# Patient Record
Sex: FEMALE | Race: WHITE | Employment: FULL TIME | URBAN - METROPOLITAN AREA
[De-identification: names, ages, dates, MRNs, and addresses within clinical notes are randomized per-mention and may not be internally consistent; named-entity substitution may affect disease eponyms.]

---

## 2018-04-03 ENCOUNTER — HOSPITAL ENCOUNTER (EMERGENCY)
Age: 63
Discharge: HOME | End: 2018-04-03
Attending: EMERGENCY MEDICINE
Payer: COMMERCIAL

## 2018-04-03 VITALS
TEMPERATURE: 98.4 F | DIASTOLIC BLOOD PRESSURE: 88 MMHG | HEART RATE: 92 BPM | SYSTOLIC BLOOD PRESSURE: 173 MMHG | OXYGEN SATURATION: 96 % | BODY MASS INDEX: 32.38 KG/M2 | RESPIRATION RATE: 18 BRPM | WEIGHT: 182.76 LBS | HEIGHT: 63 IN

## 2018-04-03 DIAGNOSIS — Z71.6 TOBACCO ABUSE COUNSELING: ICD-10-CM

## 2018-04-03 DIAGNOSIS — Z76.0 MEDICATION REFILL: ICD-10-CM

## 2018-04-03 DIAGNOSIS — J44.1 COPD EXACERBATION (HCC): Primary | ICD-10-CM

## 2018-04-03 PROCEDURE — 99282 EMERGENCY DEPT VISIT SF MDM: CPT

## 2018-04-03 RX ORDER — ALBUTEROL SULFATE 0.83 MG/ML
2.5 SOLUTION RESPIRATORY (INHALATION)
Qty: 24 EACH | Refills: 0 | Status: SHIPPED | OUTPATIENT
Start: 2018-04-03 | End: 2019-08-08 | Stop reason: SDUPTHER

## 2018-04-03 RX ORDER — PREDNISONE 20 MG/1
60 TABLET ORAL
Qty: 15 TAB | Refills: 0 | Status: SHIPPED | OUTPATIENT
Start: 2018-04-03 | End: 2018-04-08

## 2018-04-03 RX ORDER — DOXYCYCLINE HYCLATE 100 MG
100 TABLET ORAL 2 TIMES DAILY
Qty: 14 TAB | Refills: 0 | Status: SHIPPED | OUTPATIENT
Start: 2018-04-03 | End: 2018-04-10

## 2018-04-03 NOTE — DISCHARGE INSTRUCTIONS
Chronic Obstructive Pulmonary Disease (COPD): Care Instructions  Your Care Instructions    Chronic obstructive pulmonary disease (COPD) is a general term for a group of lung diseases, including emphysema and chronic bronchitis. People with COPD have decreased airflow in and out of the lungs, which makes it hard to breathe. The airways also can get clogged with thick mucus. Cigarette smoking is a major cause of COPD. Although there is no cure for COPD, you can slow its progress. Following your treatment plan and taking care of yourself can help you feel better and live longer. Follow-up care is a key part of your treatment and safety. Be sure to make and go to all appointments, and call your doctor if you are having problems. It's also a good idea to know your test results and keep a list of the medicines you take. How can you care for yourself at home? ?Staying healthy  ? · Do not smoke. This is the most important step you can take to prevent more damage to your lungs. If you need help quitting, talk to your doctor about stop-smoking programs and medicines. These can increase your chances of quitting for good. ? · Avoid colds and flu. Get a pneumococcal vaccine shot. If you have had one before, ask your doctor whether you need a second dose. Get the flu vaccine every fall. If you must be around people with colds or the flu, wash your hands often. ? · Avoid secondhand smoke, air pollution, and high altitudes. Also avoid cold, dry air and hot, humid air. Stay at home with your windows closed when air pollution is bad. ?Medicines and oxygen therapy  ? · Take your medicines exactly as prescribed. Call your doctor if you think you are having a problem with your medicine. ? · You may be taking medicines such as:  ¨ Bronchodilators. These help open your airways and make breathing easier. Bronchodilators are either short-acting (work for 6 to 9 hours) or long-acting (work for 24 hours).  You inhale most bronchodilators, so they start to act quickly. Always carry your quick-relief inhaler with you in case you need it while you are away from home. ¨ Corticosteroids (prednisone, budesonide). These reduce airway inflammation. They come in pill or inhaled form. You must take these medicines every day for them to work well. ? · A spacer may help you get more inhaled medicine to your lungs. Ask your doctor or pharmacist if a spacer is right for you. If it is, ask how to use it properly. ? · Do not take any vitamins, over-the-counter medicine, or herbal products without talking to your doctor first.   ? · If your doctor prescribed antibiotics, take them as directed. Do not stop taking them just because you feel better. You need to take the full course of antibiotics. ? · Oxygen therapy boosts the amount of oxygen in your blood and helps you breathe easier. Use the flow rate your doctor has recommended, and do not change it without talking to your doctor first.   Activity  ? · Get regular exercise. Walking is an easy way to get exercise. Start out slowly, and walk a little more each day. ? · Pay attention to your breathing. You are exercising too hard if you cannot talk while you are exercising. ? · Take short rest breaks when doing household chores and other activities. ? · Learn breathing methods-such as breathing through pursed lips-to help you become less short of breath. ? · If your doctor has not set you up with a pulmonary rehabilitation program, talk to him or her about whether rehab is right for you. Rehab includes exercise programs, education about your disease and how to manage it, help with diet and other changes, and emotional support. Diet  ? · Eat regular, healthy meals. Use bronchodilators about 1 hour before you eat to make it easier to eat. Eat several small meals instead of three large ones. Drink beverages at the end of the meal. Avoid foods that are hard to chew.    ? · Eat foods that contain protein so that you do not lose muscle mass. ? · Talk with your doctor if you gain too much weight or if you lose weight without trying. ?Mental health  ? · Talk to your family, friends, or a therapist about your feelings. It is normal to feel frightened, angry, hopeless, helpless, and even guilty. Talking openly about bad feelings can help you cope. If these feelings last, talk to your doctor. When should you call for help? Call 911 anytime you think you may need emergency care. For example, call if:  ? · You have severe trouble breathing. ?Call your doctor now or seek immediate medical care if:  ? · You have new or worse trouble breathing. ? · You cough up blood. ? · You have a fever. ? Watch closely for changes in your health, and be sure to contact your doctor if:  ? · You cough more deeply or more often, especially if you notice more mucus or a change in the color of your mucus. ? · You have new or worse swelling in your legs or belly. ? · You are not getting better as expected. Where can you learn more? Go to http://angela-angelica.info/. [place] in the search box to learn more about \"Chronic Obstructive Pulmonary Disease (COPD): Care Instructions. \"  Current as of: May 12, 2017  Content Version: 11.4  © 7464-3924 [employer]. Care instructions adapted under license by [employer] (which disclaims liability or warranty for this information). If you have questions about a medical condition or this instruction, always ask your healthcare professional. [place] [address] any warranty or liability for your use of this information. COPD Exacerbation Plan: Care Instructions  Your Care Instructions    If you have chronic obstructive pulmonary disease (COPD), your usual shortness of breath could suddenly get worse. You may start coughing more and have more mucus.  This flare-up is called a COPD exacerbation (say \"kq-TIL-[id number]-BAY-shun\"). A lung infection or air pollution could set off an exacerbation. Sometimes it can happen after a quick change in temperature or being around chemicals. Work with your doctor to make a plan for dealing with an exacerbation. You can better manage it if you plan ahead. Follow-up care is a key part of your treatment and safety. Be sure to make and go to all appointments, and call your doctor if you are having problems. It's also a good idea to know your test results and keep a list of the medicines you take. How can you care for yourself at home? During an exacerbation  · Do not panic if you start to have one. Quick treatment at home may help you prevent serious breathing problems. If you have a COPD exacerbation plan that you developed with your doctor, follow it. · Take your medicines exactly as your doctor tells you. ¨ Use your inhaler as directed by your doctor. If your symptoms do not get better after you use your medicine, have someone take you to the emergency room. Call an ambulance if necessary. ¨ With inhaled medicines, a spacer or a nebulizer may help you get more medicine to your lungs. Ask your doctor or pharmacist how to use them properly. Practice using the spacer in front of a mirror before you have an exacerbation. This may help you get the medicine into your lungs quickly. ¨ If your doctor has given you steroid pills, take them as directed. ¨ Your doctor may have given you a prescription for antibiotics, which you can fill if you need it. ¨ Talk to your doctor if you have any problems with your medicine. And call your doctor if you have to use your antibiotic or steroid pills. Preventing an exacerbation  · Do not smoke. This is the most important step you can take to prevent more damage to your lungs and prevent problems. If you already smoke, it is never too late to stop. If you need help quitting, talk to your doctor about stop-smoking programs and medicines.  These can increase your chances of quitting for good. · Take your daily medicines as prescribed. · Avoid colds and flu. ¨ Get a pneumococcal vaccine. ¨ Get a flu vaccine each year, as soon as it is available. Ask those you live or work with to do the same, so they will not get the flu and infect you. ¨ Try to stay away from people with colds or the flu. ¨ Wash your hands often. · Avoid secondhand smoke; air pollution; cold, dry air; hot, humid air; and high altitudes. Stay at home with your windows closed when air pollution is bad. · Learn breathing techniques for COPD, such as breathing through pursed lips. These techniques can help you breathe easier during an exacerbation. When should you call for help? Call 911 anytime you think you may need emergency care. For example, call if:  ? · You have severe trouble breathing. ? · You have severe chest pain. ?Call your doctor now or seek immediate medical care if:  ? · You have new or worse shortness of breath. ? · You develop new chest pain. ? · You are coughing more deeply or more often, especially if you notice more mucus or a change in the color of your mucus. ? · You cough up blood. ? · You have new or increased swelling in your legs or belly. ? · You have a fever. ? Watch closely for changes in your health, and be sure to contact your doctor if:  ? · You need to use your antibiotic or steroid pills. ? · Your symptoms are getting worse. Where can you learn more? Go to http://angela-angelica.info/. Enter H120 in the search box to learn more about \"COPD Exacerbation Plan: Care Instructions. \"  Current as of: May 12, 2017  Content Version: 11.4  © 7500-6909 [employer]. Care instructions adapted under license by [employer] (which disclaims liability or warranty for this information).  If you have questions about a medical condition or this instruction, always ask your healthcare professional. [employer] disclaims any warranty or liability for your use of this information. Chronic Obstructive Pulmonary Disease (COPD) Flare-Ups: Care Instructions  Your Care Instructions    Chronic obstructive pulmonary disease (COPD) is a lung disease that makes it hard to breathe. It is caused by damage to the lungs over many years, usually from smoking. COPD is often a mix of two diseases:  · Chronic bronchitis: The airways that carry air to the lungs (bronchial tubes) get inflamed and make a lot of mucus. This can narrow or block the airways. · Emphysema: In a healthy person, the tiny air sacs in the lungs are like balloons. As you breathe in and out, they get bigger and smaller to move air through your lungs. But with emphysema, these air sacs are damaged and lose their stretch. Less air gets in and out of the lungs. Many people with COPD have attacks called flare-ups or exacerbations. This is when your usual symptoms quickly get worse and stay worse. The doctor has checked you carefully. But problems can develop later. If you notice any problems or new symptoms, get medical treatment right away. Follow-up care is a key part of your treatment and safety. Be sure to make and go to all appointments, and call your doctor if you are having problems. It's also a good idea to know your test results and keep a list of the medicines you take. How can you care for yourself at home? · Be safe with medicines. Take your medicines exactly as prescribed. Call your doctor if you think you are having a problem with your medicine. You may be taking medicines such as:  ¨ Bronchodilators. These help open your airways and make breathing easier. ¨ Corticosteroids. These reduce airway inflammation. They may be given as pills, in a vein, or in an inhaled form. You may go home with pills in addition to an inhaler that you already use. · A spacer may help you get more inhaled medicine to your lungs.  Ask your doctor or pharmacist if a spacer is right for you. If it is, ask how to use it properly. · If your doctor prescribed antibiotics, take them as directed. Do not stop taking them just because you feel better. You need to take the full course of antibiotics. · If your doctor prescribed oxygen, use the flow rate your doctor has recommended. Do not change it without talking to your doctor first.  · Do not smoke. Smoking makes COPD worse. If you need help quitting, talk to your doctor about stop-smoking programs and medicines. These can increase your chances of quitting for good. When should you call for help? Call 911 anytime you think you may need emergency care. For example, call if:  ? · You have severe trouble breathing. ?Call your doctor now or seek immediate medical care if:  ? · You have new or worse trouble breathing. ? · Your coughing or wheezing gets worse. ? · You cough up dark brown or bloody mucus (sputum). ? · You have a new or higher fever. ? Watch closely for changes in your health, and be sure to contact your doctor if:  ? · You notice more mucus or a change in the color of your mucus. ? · You need to use your antibiotic or steroid pills. ? · You do not get better as expected. Where can you learn more? Go to http://angela-angelica.info/. Enter O471 in the search box to learn more about \"Chronic Obstructive Pulmonary Disease (COPD) Flare-Ups: Care Instructions. \"  Current as of: May 12, 2017  Content Version: 11.4  © 5718-8376 [employer]. Care instructions adapted under license by [employer] (which disclaims liability or warranty for this information). If you have questions about a medical condition or this instruction, always ask your healthcare professional. [place] [address] any warranty or liability for your use of this information.          Learning About Benefits From Quitting Smoking  How does quitting smoking make you healthier? If you're thinking about quitting smoking, you may have a few reasons to be smoke-free. Your health may be one of them. · When you quit smoking, you lower your risks for cancer, lung disease, heart attack, stroke, blood vessel disease, and blindness from macular degeneration. · When you're smoke-free, you get sick less often, and you heal faster. You are less likely to get colds, flu, bronchitis, and pneumonia. · As a nonsmoker, you may find that your mood is better and you are less stressed. When and how will you feel healthier? Quitting has real health benefits that start from day 1 of being smoke-free. And the longer you stay smoke-free, the healthier you get and the better you feel. The first hours  · After just 20 minutes, your blood pressure and heart rate go down. That means there's less stress on your heart and blood vessels. · Within 12 hours, the level of carbon monoxide in your blood drops back to normal. That makes room for more oxygen. With more oxygen in your body, you may notice that you have more energy than when you smoked. After 2 weeks  · Your lungs start to work better. · Your risk of heart attack starts to drop. After 1 month  · When your lungs are clear, you cough less and breathe deeper, so it's easier to be active. · Your sense of taste and smell return. That means you can enjoy food more than you have since you started smoking. Over the years  · After 1 year, your risk of heart disease is half what it would be if you kept smoking. · After 5 years, your risk of stroke starts to shrink. Within a few years after that, it's about the same as if you'd never smoked. · After 10 years, your risk of dying from lung cancer is cut by about half. And your risk for many other types of cancer is lower too. How would quitting help others in your life? When you quit smoking, you improve the health of everyone who now breathes in your smoke.   · Their heart, lung, and cancer risks drop, much like yours. · They are sick less. For babies and small children, living smoke-free means they're less likely to have ear infections, pneumonia, and bronchitis. · If you're a woman who is or will be pregnant someday, quitting smoking means a healthier . · Children who are close to you are less likely to become adult smokers. Where can you learn more? Go to http://angela-angelica.info/. Enter [id number] in the search box to learn more about \"Learning About Benefits From Quitting Smoking. \"  Current as of: 2017  Content Version: 11.4  © 0148-9256 [employer]. Care instructions adapted under license by [employer] (which disclaims liability or warranty for this information). If you have questions about a medical condition or this instruction, always ask your healthcare professional. [place] [address] any warranty or liability for your use of this information.

## 2018-04-03 NOTE — ED PROVIDER NOTES
EMERGENCY DEPARTMENT HISTORY AND PHYSICAL EXAM      Date: 4/3/2018  Patient Name: Samira Fraga    History of Presenting Illness     Chief Complaint   Patient presents with    Shortness of Breath     pt reports shortness of breath, history of COPD, needs refill of meds, inhaler last used 3 days ago    Medication Refill       History Provided By: Patient    HPI: Samira Fraga, 58 y.o. female with PMHx significant for HTN / COPD / bronchitis, presents ambulatory to the ED with cc of persistent SOB that has been ongoing for the last 3 days. She reports a history of similar symptoms associated with episodes of COPD exacerbation and believes that her current symptoms are due to the same. Pt also reports a history of frequent bronchitis. She states that her episodes of COPD exacerbation are typically well-controlled at home with her albuterol inhaler and albuterol nebulizer treatments but states that she has recently run out of the nebulizer treatments. Pt reports that she has previously been treated with courses of steroids as well. She states that she has been admitted once for COPD ~1 year ago but denies hospitalization since. Pt endorses using her inhaler and one nebulizer treatment recently without relief of her symptoms. She states that she has previously been followed by a pulmonologist but is no longer followed by one and requests resources for finding a new one. Pt specifically denies nausea, vomiting, fever, chills, or CP. PCP: Maria Victoria Steinberg MD    There are no other complaints, changes, or physical findings at this time. Current Outpatient Prescriptions   Medication Sig Dispense Refill    albuterol (PROVENTIL VENTOLIN) 2.5 mg /3 mL (0.083 %) nebulizer solution 3 mL by Nebulization route every four (4) hours as needed for Wheezing. 24 Each 0    predniSONE (DELTASONE) 20 mg tablet Take 3 Tabs by mouth daily (with breakfast) for 5 days.  15 Tab 0    doxycycline (VIBRA-TABS) 100 mg tablet Take 1 Tab by mouth two (2) times a day for 7 days. 14 Tab 0    budesonide-formoterol (SYMBICORT) 160-4.5 mcg/actuation HFA inhaler Take 2 Puffs by inhalation two (2) times a day. 1 Inhaler 5    hydrocortisone (HYTONE) 2.5 % topical cream Apply  to affected area two (2) times a day. use thin layer 30 g 5    cloNIDine HCl (CATAPRES) 0.2 mg tablet Take 1 Tab by mouth nightly. 30 Tab 2    cholecalciferol (VITAMIN D3) 1,000 unit tablet Take 1 Tab by mouth daily. 30 Tab 6    losartan-hydrochlorothiazide (HYZAAR) 100-25 mg per tablet Take 1 Tab by mouth daily. 30 Tab 3    atorvastatin (LIPITOR) 20 mg tablet Take  by mouth daily.  albuterol (PROVENTIL, VENTOLIN) 90 mcg/Actuation inhaler Take 1-2 Puffs by inhalation every four (4) hours as needed for Wheezing. 17 g 0       Past History     Past Medical History:  Past Medical History:   Diagnosis Date    Acute bronchitis 4/7/2016    Atopic dermatitis 4/7/2016    Chronic obstructive pulmonary disease ([facility] [address]     Essential hypertension 3/31/2016    Eye problems 3/31/2016       Past Surgical History:  History reviewed. No pertinent surgical history. Family History:  History reviewed. No pertinent family history. Social History:  Social History   Substance Use Topics    Smoking status: Current Every Day Smoker     Types: Cigarettes    Smokeless tobacco: Never Used    Alcohol use No       Allergies:  No Known Allergies      Review of Systems   Review of Systems   Constitutional: Negative. Negative for activity change, appetite change, chills, diaphoresis, fever and unexpected weight change. HENT: Negative for congestion, hearing loss, rhinorrhea, sinus pressure, sneezing, sore throat and trouble swallowing. Eyes: Negative for pain, redness, itching and visual disturbance. Respiratory: Positive for shortness of breath. Negative for cough and wheezing. Cardiovascular: Negative for chest pain, palpitations and leg swelling.    Gastrointestinal: Negative for abdominal pain, constipation, diarrhea, nausea and vomiting. Genitourinary: Negative for dysuria. Musculoskeletal: Negative for arthralgias, gait problem and myalgias. Skin: Negative for color change, pallor, rash and wound. Neurological: Negative for tremors, weakness, light-headedness, numbness and headaches. All other systems reviewed and are negative. Physical Exam   Physical Exam   Constitutional: She is oriented to person, place, and time. Vital signs are normal. She appears well-developed and well-nourished. No distress. 58 y.o. female in NAD  Communicates appropriately and in full sentences  Normal vital signs   HENT:   Head: Normocephalic and atraumatic. Eyes: Conjunctivae are normal. Pupils are equal, round, and reactive to light. Right eye exhibits no discharge. Left eye exhibits no discharge. Neck: Normal range of motion. Neck supple. No nuchal rigidity   Cardiovascular: Normal rate, regular rhythm and intact distal pulses. Pulmonary/Chest: Effort normal. No respiratory distress. Diffuse expiratory wheezes  Communicating in full sentences   Abdominal: Soft. Bowel sounds are normal. She exhibits no distension. There is no tenderness. Musculoskeletal: Normal range of motion. She exhibits no edema, tenderness or deformity. No neurologic, motor, vascular, or compartment embarrassment observed on exam. No focal neurologic deficits. Neurological: She is alert and oriented to person, place, and time. Coordination normal.   Skin: Skin is warm and dry. No rash noted. She is not diaphoretic. No erythema. No pallor. Psychiatric: She has a normal mood and affect. Nursing note and vitals reviewed. Medical Decision Making   I am the first provider for this patient. I reviewed the vital signs, available nursing notes, past medical history, past surgical history, family history and social history. Vital Signs-Reviewed the patient's vital signs.   Patient Vitals for the past 12 hrs:   Temp Pulse Resp BP SpO2   04/03/18 1419 98.4 °F (36.9 °C) 92 18 173/88 96 %       Pulse Oximetry Analysis - 96% on RA    Records Reviewed: Nursing Notes and Old Medical Records    Provider Notes (Medical Decision Making):   DDx COPD exacerbation, non-compliance with medical treatment, asthma, bronchitis, tobacco abuse    57 yo with PMH of COPD and active tobacco user presents with complaints of SOB. Pt states that she ran out of her inhaler that she normally uses and has discharged from her pulmologist since she has missed so many appointments. Pt declines CXR today, stating \"I know my own body. \" Provided pt with struct return precautions. Pt states that she normally takes steroids, inhaler, and ABX. Will discharge with requested medications. Close PCP follow-up. Pt expresses understanding. Provided customary ED return precautions. ED Course:   Initial assessment performed. The patients presenting problems have been discussed, and they are in agreement with the care plan formulated and outlined with them. I have encouraged them to ask questions as they arise throughout their visit. Tobacco Counseling  Discussed the risks of smoking and the benefits of smoking cessation as well as the long term sequelae of smoking with the patient. The patient verbalized their understanding. Progress Note:  2:35 PM  Pt declines any further work-up at this time, including a chest X-ray. She states \"I know my body\" and would prefer to be discharged home with her prescriptions. Written by Juan Stiles, ED Scribe, as dictated by "[employer]CB. Critical Care Time:   0    Disposition:  DISCHARGE NOTE  2:39 PM  The patient has been re-evaluated and is ready for discharge. Reviewed available results with patient. Counseled pt on diagnosis and care plan. Pt has expressed understanding, and all questions have been answered.  Pt agrees with plan and agrees to F/U as recommended, or return to the ED if their sxs worsen. Discharge instructions have been provided and explained to the pt, along with reasons to return to the ED. Written by Kaykay Cade ED Scribe, as dictated by Alveta Bosworth, PA-C. PLAN:  1. Discharge Medication List as of 4/3/2018  2:36 PM      START taking these medications    Details   albuterol (PROVENTIL VENTOLIN) 2.5 mg /3 mL (0.083 %) nebulizer solution 3 mL by Nebulization route every four (4) hours as needed for Wheezing., Normal, Disp-24 Each, R-0      predniSONE (DELTASONE) 20 mg tablet Take 3 Tabs by mouth daily (with breakfast) for 5 days. , Normal, Disp-15 Tab, R-0      doxycycline (VIBRA-TABS) 100 mg tablet Take 1 Tab by mouth two (2) times a day for 7 days. , Normal, Disp-14 Tab, R-0         CONTINUE these medications which have NOT CHANGED    Details   budesonide-formoterol (SYMBICORT) 160-4.5 mcg/actuation HFA inhaler Take 2 Puffs by inhalation two (2) times a day., Print, Disp-1 Inhaler, R-5      hydrocortisone (HYTONE) 2.5 % topical cream Apply  to affected area two (2) times a day. use thin layer, Print, Disp-30 g, R-5      cloNIDine HCl (CATAPRES) 0.2 mg tablet Take 1 Tab by mouth nightly. , Print, Disp-30 Tab, R-2      cholecalciferol (VITAMIN D3) 1,000 unit tablet Take 1 Tab by mouth daily. , Normal, Disp-30 Tab, R-6      losartan-hydrochlorothiazide (HYZAAR) 100-25 mg per tablet Take 1 Tab by mouth daily. , Normal, Disp-30 Tab, R-3      atorvastatin (LIPITOR) 20 mg tablet Take  by mouth daily. , Historical Med      albuterol (PROVENTIL, VENTOLIN) 90 mcg/Actuation inhaler Take 1-2 Puffs by inhalation every four (4) hours as needed for Wheezing., Print, Disp-17 g, R-0           2.    Follow-up Information     Follow up With Details Comments [address] [place] [address], MD Schedule an appointment as soon as possible for a visit in 2 days As needed, If symptoms worsen, Possible further evaluation and treatment [address] [place] [id number]  [phone]      [facility] EMERGENCY DEPT Go to As needed, If symptoms worsen [address] Sharon Saucedo MD Call today  [address] [facility]  [address]  [facility]  [phone]          Return to ED if worse     Diagnosis     Clinical Impression:   1. COPD exacerbation ([address]    2. Tobacco abuse counseling    3. Medication refill        Attestations: This note is prepared by Lennox Parry, acting as Scribe for [employer]. The scribe's documentation has been prepared under my direction and personally reviewed by me in its entirety. I confirm that the note above accurately reflects all work, treatment, procedures, and medical decision making performed by me. [employer]        This note will not be viewable in [address]

## 2018-04-03 NOTE — LETTER
[address] 
[facility] EMERGENCY DEPT 
[address] [facility] [id number] [phone] Work/School Note Date: 4/3/2018 To Whom It May concern: 
 
Aspen Glover was seen and treated today in the emergency room by the following provider(s): 
Attending Provider: Anastasia Garcia DO Physician Assistant: Yareli Mcmillan. Kristin Wallace. Aspen Glover may return to work on 4/5/2018. Sincerely, 
 
 
 
 
Yareli Mcmillan.  Kristin Wallace

## 2018-07-26 ENCOUNTER — OFFICE VISIT (OUTPATIENT)
Dept: FAMILY MEDICINE CLINIC | Age: 63
End: 2018-07-26

## 2018-07-26 VITALS
RESPIRATION RATE: 16 BRPM | BODY MASS INDEX: 32.04 KG/M2 | TEMPERATURE: 98.1 F | SYSTOLIC BLOOD PRESSURE: 140 MMHG | DIASTOLIC BLOOD PRESSURE: 70 MMHG | HEART RATE: 100 BPM | WEIGHT: 180.8 LBS | HEIGHT: 63 IN | OXYGEN SATURATION: 95 %

## 2018-07-26 DIAGNOSIS — J44.9 COPD (CHRONIC OBSTRUCTIVE PULMONARY DISEASE) WITH CHRONIC BRONCHITIS (HCC): ICD-10-CM

## 2018-07-26 DIAGNOSIS — I10 ESSENTIAL HYPERTENSION: Primary | ICD-10-CM

## 2018-07-26 DIAGNOSIS — I10 ESSENTIAL HYPERTENSION: ICD-10-CM

## 2018-07-26 DIAGNOSIS — J45.20 MILD INTERMITTENT ASTHMA WITHOUT COMPLICATION: ICD-10-CM

## 2018-07-26 DIAGNOSIS — J20.9 ACUTE BRONCHITIS, UNSPECIFIED ORGANISM: ICD-10-CM

## 2018-07-26 LAB — HBA1C MFR BLD HPLC: 11.2 %

## 2018-07-26 RX ORDER — ATORVASTATIN CALCIUM 20 MG/1
20 TABLET, FILM COATED ORAL DAILY
Qty: 30 TAB | Refills: 5
Start: 2018-07-26 | End: 2018-10-11

## 2018-07-26 RX ORDER — NITROGLYCERIN 0.3 MG/1
TABLET SUBLINGUAL
COMMUNITY
End: 2019-03-22 | Stop reason: ALTCHOICE

## 2018-07-26 RX ORDER — LOSARTAN POTASSIUM AND HYDROCHLOROTHIAZIDE 25; 100 MG/1; MG/1
TABLET ORAL
COMMUNITY
End: 2018-10-11 | Stop reason: SDUPTHER

## 2018-07-26 RX ORDER — IPRATROPIUM BROMIDE AND ALBUTEROL SULFATE 2.5; .5 MG/3ML; MG/3ML
3 SOLUTION RESPIRATORY (INHALATION)
Qty: 60 NEBULE | Refills: 4 | Status: SHIPPED | OUTPATIENT
Start: 2018-07-26 | End: 2019-03-22 | Stop reason: SDUPTHER

## 2018-07-26 RX ORDER — ENALAPRIL MALEATE 10 MG/1
TABLET ORAL
COMMUNITY
End: 2019-03-22 | Stop reason: ALTCHOICE

## 2018-07-26 RX ORDER — ROSUVASTATIN CALCIUM 5 MG/1
TABLET, COATED ORAL
COMMUNITY
End: 2018-10-11 | Stop reason: SDUPTHER

## 2018-07-26 RX ORDER — NICOTINE 7MG/24HR
PATCH, TRANSDERMAL 24 HOURS TRANSDERMAL
COMMUNITY
End: 2019-03-22 | Stop reason: ALTCHOICE

## 2018-07-26 RX ORDER — LORAZEPAM 1 MG/1
TABLET ORAL
COMMUNITY
End: 2018-10-11 | Stop reason: SDUPTHER

## 2018-07-26 RX ORDER — IBUPROFEN 200 MG
TABLET ORAL
COMMUNITY
End: 2019-03-22 | Stop reason: ALTCHOICE

## 2018-07-26 RX ORDER — IPRATROPIUM BROMIDE AND ALBUTEROL SULFATE 2.5; .5 MG/3ML; MG/3ML
SOLUTION RESPIRATORY (INHALATION)
Qty: 70 NEBULE | Refills: 4 | Status: SHIPPED | OUTPATIENT
Start: 2018-07-26 | End: 2018-07-26 | Stop reason: SDUPTHER

## 2018-07-26 RX ORDER — PREDNISONE 10 MG/1
TABLET ORAL
COMMUNITY
End: 2019-03-22 | Stop reason: ALTCHOICE

## 2018-07-26 RX ORDER — LOSARTAN POTASSIUM AND HYDROCHLOROTHIAZIDE 25; 100 MG/1; MG/1
1 TABLET ORAL DAILY
Qty: 30 TAB | Refills: 5 | Status: SHIPPED | OUTPATIENT
Start: 2018-07-26 | End: 2019-03-22 | Stop reason: SDUPTHER

## 2018-07-26 RX ORDER — BUDESONIDE AND FORMOTEROL FUMARATE DIHYDRATE 160; 4.5 UG/1; UG/1
2 AEROSOL RESPIRATORY (INHALATION) 2 TIMES DAILY
Qty: 1 INHALER | Refills: 5 | Status: SHIPPED | OUTPATIENT
Start: 2018-07-26 | End: 2019-06-21 | Stop reason: SDUPTHER

## 2018-07-26 RX ORDER — ATORVASTATIN CALCIUM 20 MG/1
TABLET, FILM COATED ORAL
COMMUNITY
End: 2018-07-26 | Stop reason: SDUPTHER

## 2018-07-26 RX ORDER — LORAZEPAM 0.5 MG/1
TABLET ORAL
COMMUNITY
End: 2018-10-11 | Stop reason: SDUPTHER

## 2018-07-26 RX ORDER — LEVOFLOXACIN 500 MG/1
500 TABLET, FILM COATED ORAL DAILY
Qty: 10 TAB | Refills: 0 | Status: SHIPPED | OUTPATIENT
Start: 2018-07-26 | End: 2018-08-05

## 2018-07-26 RX ORDER — ALBUTEROL SULFATE 0.83 MG/ML
SOLUTION RESPIRATORY (INHALATION)
COMMUNITY
End: 2018-07-26 | Stop reason: SDUPTHER

## 2018-07-26 RX ORDER — PREDNISONE 20 MG/1
TABLET ORAL
COMMUNITY
End: 2019-03-22 | Stop reason: ALTCHOICE

## 2018-07-26 RX ORDER — IPRATROPIUM BROMIDE AND ALBUTEROL SULFATE 2.5; .5 MG/3ML; MG/3ML
SOLUTION RESPIRATORY (INHALATION)
COMMUNITY
End: 2018-07-26 | Stop reason: SDUPTHER

## 2018-07-26 RX ORDER — AZITHROMYCIN 250 MG/1
TABLET, FILM COATED ORAL
COMMUNITY
End: 2018-07-26 | Stop reason: ALTCHOICE

## 2018-07-26 RX ORDER — METOPROLOL SUCCINATE 25 MG/1
TABLET, EXTENDED RELEASE ORAL
COMMUNITY
End: 2018-07-26 | Stop reason: SDUPTHER

## 2018-07-26 RX ORDER — LOSARTAN POTASSIUM AND HYDROCHLOROTHIAZIDE 25; 100 MG/1; MG/1
TABLET ORAL
COMMUNITY
End: 2018-07-26 | Stop reason: SDUPTHER

## 2018-07-26 RX ORDER — METOPROLOL SUCCINATE 25 MG/1
TABLET, EXTENDED RELEASE ORAL
Qty: 45 TAB | Refills: 5 | Status: SHIPPED | OUTPATIENT
Start: 2018-07-26 | End: 2019-06-21 | Stop reason: SDUPTHER

## 2018-07-26 RX ORDER — ALBUTEROL SULFATE 90 UG/1
AEROSOL, METERED RESPIRATORY (INHALATION)
COMMUNITY
End: 2018-07-26 | Stop reason: SDUPTHER

## 2018-07-26 RX ORDER — DOXYCYCLINE HYCLATE 100 MG
TABLET ORAL
COMMUNITY
End: 2019-03-22 | Stop reason: ALTCHOICE

## 2018-07-26 NOTE — PROGRESS NOTES
HISTORY OF PRESENT ILLNESS  Veronika Cutler is a 58 y.o. female.   HPI   Pt w patient present noncompliant with medical advice follow-up and medication unfortunately she is blaming the cost of medication and medical visit as the reason why she is not getting better she is wanted to pack tobacco abuser for many years has not tried to slow down her cigarette habits not active does not do any daily walking she is coughing and wheezing and stating that her medication a very costly and she is not able to afford her medications today she was told that she may go to [facility] for better medical care and better medication services in addition she was told to look for Doctor who have no co-pays stating that she has no money to pay for her [facility] and that is why she does not like to see a doc at all they get all her money,   Needs refill for bp meds, nebulizer requesting prednisone and abx, a heavy smoker, seen the lung doc once in 2017,not willing to see him back for another 90 $$$,  Pt has been recently t urgent care and given another set of prednison again insisiting to have another course,s tating that she was told that her sugar can go up in the past, regardless she needs all her medication to be refilled today, Upper respiratory problem    Started >9 days ago not better,   otc not helping, have a very bad Sore throat, with a lot of painful Cough which are Productive yellowish , ++ COPD, there has been a lot of decrease in the patient's sleep pattern, in addition there has been some muscle ache responding to OTC , no diarhea, no ear ache,also there has been a decrease in the appetite, has been had an exposure to sick person,    She is also having high blood pressure needs refill for losartan hydrochlorothiazide and clonidine to be taken nightly for blood pressure and to make her sleep better she also indicated that she has sleeping problem at night, regardless overall she has multiple medical condition and she is noncompliant with her medications office visits in addition she is a heavy tobacco abuser    Current Outpatient Prescriptions   Medication Sig Dispense Refill    ALBUTEROL SULFATE IN Take 0.5 mL by inhalation three (3) times daily as needed.  albuterol-ipratropium (DUO-NEB) 2.5 mg-0.5 mg/3 ml nebu ipratropium-albuterol 0.5 mg-3 mg(2.5 mg base)/3 mL nebulization soln      metoprolol succinate (TOPROL-XL) 25 mg XL tablet take one-half tablet by mouth daily      nitroglycerin (NITROSTAT) 0.3 mg SL tablet nitroglycerin 0.3 mg sublingual tablet      albuterol (PROVENTIL VENTOLIN) 2.5 mg /3 mL (0.083 %) nebulizer solution 3 mL by Nebulization route every four (4) hours as needed for Wheezing. 24 Each 0    cloNIDine HCl (CATAPRES) 0.2 mg tablet Take 1 Tab by mouth nightly. 30 Tab 2    cholecalciferol (VITAMIN D3) 1,000 unit tablet Take 1 Tab by mouth daily. 30 Tab 6    losartan-hydrochlorothiazide (HYZAAR) 100-25 mg per tablet Take 1 Tab by mouth daily. 30 Tab 3    albuterol (PROVENTIL, VENTOLIN) 90 mcg/Actuation inhaler Take 1-2 Puffs by inhalation every four (4) hours as needed for Wheezing.  17 g 0    doxycycline (VIBRA-TABS) 100 mg tablet doxycycline hyclate 100 mg tablet      enalapril (VASOTEC) 10 mg tablet enalapril maleate 10 mg tablet      LORazepam (ATIVAN) 0.5 mg tablet lorazepam 0.5 mg tablet      LORazepam (ATIVAN) 1 mg tablet lorazepam 1 mg tablet      nicotine (NICODERM CQ) 14 mg/24 hr patch nicotine 14 mg/24 hr daily transdermal patch      nicotine (NICODERM CQ) 21 mg/24 hr nicotine 21 mg/24 hr daily transdermal patch      nicotine (NICODERM CQ) 7 mg/24 hr nicotine 7 mg/24 hr daily transdermal patch      predniSONE (DELTASONE) 10 mg tablet Administer 3 tablets now      predniSONE (DELTASONE) 20 mg tablet prednisone 20 mg tablet      rosuvastatin (CRESTOR) 5 mg tablet rosuvastatin 5 mg tablet      tiotropium (SPIRIVA WITH HANDIHALER) 18 mcg inhalation capsule Spiriva with HandiHaler 18 mcg and inhalation capsules      losartan-hydroCHLOROthiazide (HYZAAR) 100-25 mg per tablet Take 1 tablet every day by oral route for 30 days.  budesonide-formoterol (SYMBICORT) 160-4.5 mcg/actuation HFA inhaler Take 2 Puffs by inhalation two (2) times a day. (Patient not taking: Reported on 7/26/2018) 1 Inhaler 5    hydrocortisone (HYTONE) 2.5 % topical cream Apply  to affected area two (2) times a day. use thin layer 30 g 5    atorvastatin (LIPITOR) 20 mg tablet Take  by mouth daily. No Known Allergies  Past Medical History:   Diagnosis Date    Acute bronchitis 4/7/2016    Atopic dermatitis 4/7/2016    Chronic obstructive pulmonary disease (HCC)     COPD (chronic obstructive pulmonary disease) with chronic bronchitis ([facility] [address] 7/26/2018    Essential hypertension 3/31/2016    Eye problems 3/31/2016     History reviewed. No pertinent surgical history. Family History   Problem Relation Age of Onset    Diabetes Father     Arthritis-osteo Sister     Diabetes Sister     Gall Bladder Disease Sister      Social History   Substance Use Topics    Smoking status: Current Every Day Smoker     Types: Cigarettes    Smokeless tobacco: Never Used    Alcohol use No      Lab Results  Component Value Date/Time   WBC 11.3 (H) 03/31/2016 02:47 PM   HGB 13.9 03/31/2016 02:47 PM   HCT 40.7 03/31/2016 02:47 PM   PLATELET 029 45/71/5620 02:47 PM   MCV 89 03/31/2016 02:47 PM     Lab Results  Component Value Date/Time   GFR est non-AA 83 03/31/2016 02:47 PM   GFRNA, POC >60 01/17/2012 11:11 AM   GFR est AA 96 03/31/2016 02:47 PM   GFRAA, POC >60 01/17/2012 11:11 AM   Creatinine 0.78 03/31/2016 02:47 PM   Creatinine (POC) 1.0 01/17/2012 11:11 AM   BUN 13 03/31/2016 02:47 PM   Sodium 143 03/31/2016 02:47 PM   Potassium 4.3 03/31/2016 02:47 PM   Chloride 99 03/31/2016 02:47 PM   CO2 31 (H) 03/31/2016 02:47 PM        Review of Systems   Constitutional: Negative for chills and fever.    HENT: Negative for congestion and nosebleeds. Eyes: Negative for blurred vision and pain. Respiratory: Negative for cough, shortness of breath and wheezing. Cardiovascular: Negative for chest pain and leg swelling. Gastrointestinal: Negative for constipation, diarrhea, nausea and vomiting. Genitourinary: Negative for dysuria and frequency. Musculoskeletal: Negative for joint pain and myalgias. Skin: Negative for itching and rash. Neurological: Negative for dizziness, loss of consciousness and headaches. Psychiatric/Behavioral: Negative for depression. The patient is not nervous/anxious and does not have insomnia. Physical Exam   Constitutional: She is oriented to person, place, and time. She appears well-developed and well-nourished. HENT:   Head: Normocephalic and atraumatic. Eyes: Conjunctivae and EOM are normal. Pupils are equal, round, and reactive to light. Neck: No JVD present. No thyromegaly present. Cardiovascular: Normal rate, regular rhythm, normal heart sounds and intact distal pulses. Exam reveals no gallop and no friction rub. No murmur heard. Pulmonary/Chest: Effort normal and breath sounds normal. No stridor. No respiratory distress. She has no wheezes. She has no rales. Abdominal: Soft. Bowel sounds are normal. She exhibits no distension and no mass. There is no tenderness. Musculoskeletal: Normal range of motion. She exhibits no edema or tenderness. Lymphadenopathy:     She has no cervical adenopathy. Neurological: She is alert and oriented to person, place, and time. She has normal reflexes. No cranial nerve deficit. Skin: No rash noted. No erythema. Psychiatric: She has a normal mood and affect. Her behavior is normal.   Nursing note and vitals reviewed. ASSESSMENT and PLAN  Diagnoses and all orders for this visit:    1. Essential hypertension  -     losartan-hydroCHLOROthiazide (HYZAAR) 100-25 mg per tablet; Take 1 Tab by mouth daily.   -     atorvastatin (LIPITOR) 20 mg tablet; Take 1 Tab by mouth daily. -     budesonide-formoterol (SYMBICORT) 160-4.5 mcg/actuation HFAA; Take 2 Puffs by inhalation two (2) times a day. -     CBC W/O DIFF  -     METABOLIC PANEL, COMPREHENSIVE  -     TSH 3RD GENERATION  -     LIPID PANEL  -     levoFLOXacin (LEVAQUIN) 500 mg tablet; Take 1 Tab by mouth daily for 10 days. -     metoprolol succinate (TOPROL-XL) 25 mg XL tablet; take one-half tablet by mouth daily  -     AMB POC HEMOGLOBIN A1C    2. COPD (chronic obstructive pulmonary disease) with chronic bronchitis (HCC)  -     losartan-hydroCHLOROthiazide (HYZAAR) 100-25 mg per tablet; Take 1 Tab by mouth daily. -     atorvastatin (LIPITOR) 20 mg tablet; Take 1 Tab by mouth daily. -     budesonide-formoterol (SYMBICORT) 160-4.5 mcg/actuation HFAA; Take 2 Puffs by inhalation two (2) times a day. -     CBC W/O DIFF  -     METABOLIC PANEL, COMPREHENSIVE  -     TSH 3RD GENERATION  -     LIPID PANEL  -     levoFLOXacin (LEVAQUIN) 500 mg tablet; Take 1 Tab by mouth daily for 10 days. -     metoprolol succinate (TOPROL-XL) 25 mg XL tablet; take one-half tablet by mouth daily  -     AMB POC HEMOGLOBIN A1C    3. Mild intermittent asthma without complication  -     losartan-hydroCHLOROthiazide (HYZAAR) 100-25 mg per tablet; Take 1 Tab by mouth daily. -     atorvastatin (LIPITOR) 20 mg tablet; Take 1 Tab by mouth daily. -     budesonide-formoterol (SYMBICORT) 160-4.5 mcg/actuation HFAA; Take 2 Puffs by inhalation two (2) times a day. -     CBC W/O DIFF  -     METABOLIC PANEL, COMPREHENSIVE  -     TSH 3RD GENERATION  -     LIPID PANEL  -     levoFLOXacin (LEVAQUIN) 500 mg tablet; Take 1 Tab by mouth daily for 10 days. -     metoprolol succinate (TOPROL-XL) 25 mg XL tablet; take one-half tablet by mouth daily  -     AMB POC HEMOGLOBIN A1C    4. Acute bronchitis, unspecified organism  -     losartan-hydroCHLOROthiazide (HYZAAR) 100-25 mg per tablet; Take 1 Tab by mouth daily.   -     atorvastatin (LIPITOR) 20 mg tablet; Take 1 Tab by mouth daily. -     budesonide-formoterol (SYMBICORT) 160-4.5 mcg/actuation HFAA; Take 2 Puffs by inhalation two (2) times a day. -     CBC W/O DIFF  -     METABOLIC PANEL, COMPREHENSIVE  -     TSH 3RD GENERATION  -     LIPID PANEL  -     levoFLOXacin (LEVAQUIN) 500 mg tablet; Take 1 Tab by mouth daily for 10 days.   -     metoprolol succinate (TOPROL-XL) 25 mg XL tablet; take one-half tablet by mouth daily  -     AMB POC HEMOGLOBIN A1C

## 2018-07-26 NOTE — MR AVS SNAPSHOT
[address] [facility] [id number] 
[phone] Patient: Bhavna Bailey MRN: [id number] :1955 Visit Information Date & Time Provider Department Dept. Phone Encounter #  
 2018  8:45 AM Isaiah Smallwood MD [address] [place] [address] OFFICE-ANNEX [phone] [id number] Follow-up Instructions Return in about 4 weeks (around 2018), or if symptoms worsen or fail to improve. Upcoming Health Maintenance Date Due Hepatitis C Screening 1955 Pneumococcal 19-64 Medium Risk (1 of 1 - PPSV23) 1974 DTaP/Tdap/Td series (1 - Tdap) 1976 PAP AKA CERVICAL CYTOLOGY 1976 FOBT Q 1 YEAR AGE 50-75 2005 BREAST CANCER SCRN MAMMOGRAM 2014 ZOSTER VACCINE AGE 60> 10/22/2015 Influenza Age 5 to Adult 2018 Allergies as of 2018  Review Complete On: 2018 By: Ozzie Milton LPN No Known Allergies Current Immunizations  Never Reviewed No immunizations on file. Not reviewed this visit You Were Diagnosed With   
  
 Codes Comments Essential hypertension    -  Primary ICD-10-CM: I10 
ICD-9-CM: 401.9 COPD (chronic obstructive pulmonary disease) with [facility] bronch[facility] ([facility]     ICD-10-CM: J44.9 ICD-9-CM: 491.20 Mild intermittent asthma without complication     WJR-83-UG: J45.20 ICD-9-CM: 493.90 Acute bronchitis, unspecified organism     ICD-10-CM: J20.9 ICD-9-CM: 466.0 Vitals BP Pulse Temp Resp Height(growth percentile) Weight(growth percentile) 154/73 (BP 1 Location: Left arm, BP Patient Position: At rest) 100 98.1 °F (36.7 °C) (Oral) 16 5' 3\" (1.6 m) 180 lb 12.8 oz (82 kg) SpO2 BMI OB Status Smoking Status 95% 32.03 kg/m2 Hysterectomy Current Every Day Smoker Vitals History BMI and BSA Data Body Mass Index Body Surface Area 32.03 kg/m 2 1.91 m 2 Preferred Pharmacy Pharmacy Name Phone Mariam Forbes[facility], [facility] [phone] Your Updated Medication List  
  
   
This list is accurate as of 7/26/18  2:05 PM.  Always use your most recent med list.  
  
  
  
  
 * ALBUTEROL SULFATE IN Take 0.5 mL by inhalation three (3) times daily as needed. * albuterol 2.5 mg /3 mL (0.083 %) nebulizer solution Commonly known as:  PROVENTIL VENTOLIN  
3 mL by Nebulization route every four (4) hours as needed for Wheezing. albuterol 90 mcg/actuation inhaler Commonly known as:  Luevenia Gills Take 1-2 Puffs by inhalation every four (4) hours as needed for Wheezing. albuterol-ipratropium 2.5 mg-0.5 mg/3 ml Nebu Commonly known as:  DUO-NEB  
3 mL by Nebulization route every four (4) hours as needed. atorvastatin 20 mg tablet Commonly known as:  LIPITOR Take 1 Tab by mouth daily. budesonide-formoterol 160-4.5 mcg/actuation Hfaa Commonly known as:  SYMBICORT Take 2 Puffs by inhalation two (2) times a day. cholecalciferol 1,000 unit tablet Commonly known as:  VITAMIN D3 Take 1 Tab by mouth daily. cloNIDine HCl 0.2 mg tablet Commonly known as:  CATAPRES Take 1 Tab by mouth nightly. doxycycline 100 mg tablet Commonly known as:  VIBRA-TABS  
doxycycline hyclate 100 mg tablet  
  
 enalapril 10 mg tablet Commonly known as:  VASOTEC  
enalapril maleate 10 mg tablet  
  
 hydrocortisone 2.5 % topical cream  
Commonly known as:  HYTONE Apply  to affected area two (2) times a day. use thin layer  
  
 levoFLOXacin 500 mg tablet Commonly known as:  Robert Raleigh Take 1 Tab by mouth daily for 10 days. * LORazepam 0.5 mg tablet Commonly known as:  ATIVAN  
lorazepam 0.5 mg tablet * LORazepam 1 mg tablet Commonly known as:  ATIVAN  
lorazepam 1 mg tablet * losartan-hydroCHLOROthiazide 100-25 mg per tablet Commonly known as:  HYZAAR  
 Take 1 tablet every day by oral route for 30 days. * losartan-hydroCHLOROthiazide 100-25 mg per tablet Commonly known as:  HYZAAR Take 1 Tab by mouth daily. metoprolol succinate 25 mg XL tablet Commonly known as:  TOPROL-XL  
take one-half tablet by mouth daily * nicotine 14 mg/24 hr patch Commonly known as:  NICODERM CQ  
nicotine 14 mg/24 hr daily transdermal patch  
  
 * nicotine 21 mg/24 hr  
Commonly known as:  NICODERM CQ  
nicotine 21 mg/24 hr daily transdermal patch  
  
 * nicotine 7 mg/24 hr  
Commonly known as:  NICODERM CQ  
nicotine 7 mg/24 hr daily transdermal patch  
  
 nitroglycerin 0.3 mg SL tablet Commonly known as:  NITROSTAT  
nitroglycerin 0.3 mg sublingual tablet * predniSONE 10 mg tablet Commonly known as:  Nona Beam Administer 3 tablets now * predniSONE 20 mg tablet Commonly known as:  DELTASONE  
prednisone 20 mg tablet  
  
 rosuvastatin 5 mg tablet Commonly known as:  CRESTOR  
rosuvastatin 5 mg tablet SPIRIVA WITH HANDIHALER 18 mcg inhalation capsule Generic drug:  tiotropium Spiriva with HandiHaler 18 mcg and inhalation capsules * Notice: This list has 11 medication(s) that are the same as other medications prescribed for you. Read the directions carefully, and ask your doctor or other care provider to review them with you. Prescriptions Sent to Pharmacy Refills  
 losartan-hydroCHLOROthiazide (HYZAAR) 100-25 mg per tablet 5 Sig: Take 1 Tab by mouth daily. Class: Normal  
 Pharmacy: [address] Ph #: [phone] Route: Oral  
 budesonide-formoterol (SYMBICORT) 160-4.5 mcg/actuation HFAA 5 Sig: Take 2 Puffs by inhalation two (2) times a day. Class: Normal  
 Pharmacy: [address], [address] [place] [address] Ph #: [phone]  Route: Inhalation  
 levoFLOXacin (LEVAQUIN) 500 mg tablet 0  
 Sig: Take 1 Tab by mouth daily for 10 days. Class: Normal  
 Pharmacy: [address] Ph #: [phone] Route: Oral  
 metoprolol succinate (TOPROL-XL) 25 mg XL tablet 5 Sig: take one-half tablet by mouth daily Class: Normal  
 Pharmacy: [address] Ph #: [phone]  
 albuterol-ipratropium (DUO-NEB) 2.5 mg-0.5 mg/3 ml nebu (Discontinued) 4 Sig: ipratropium-albuterol 0.5 mg-3 mg(2.5 mg base)/3 mL nebulization soln Class: Normal  
 Pharmacy: [address] Ph #: [phone] Reason for Discontinue: Reorder We Performed the Following AMB POC HEMOGLOBIN A1C [42838 CPT(R)] CBC W/O DIFF [34749 CPT(R)] LIPID PANEL [48271 CPT(R)] METABOLIC PANEL, COMPREHENSIVE [32342 CPT(R)] TSH 3RD GENERATION [97336 CPT(R)] Follow-up Instructions Return in about 4 weeks (around 8/23/2018), or if symptoms worsen or fail to improve. Patient Instructions Body Mass Index: Care Instructions Your Care Instructions Body mass index (BMI) can help you see if your weight is raising your risk for health problems. It uses a formula to compare how much you weigh with how tall you are. · A BMI lower than 18.5 is considered underweight. · A BMI between 18.5 and 24.9 is considered healthy. · A BMI between 25 and 29.9 is considered overweight. A BMI of 30 or higher is considered obese. If your BMI is in the normal range, it means that you have a lower risk for weight-related health problems. If your BMI is in the overweight or obese range, you may be at increased risk for weight-related health problems, such as high blood pressure, heart disease, stroke, arthritis or joint pain, and diabetes.  If your BMI is in the underweight range, you may be at increased risk for health problems such as fatigue, lower protection (immunity) against illness, muscle loss, bone loss, hair loss, and hormone problems. BMI is just one measure of your risk for weight-related health problems. You may be at higher risk for health problems if you are not active, you eat an unhealthy diet, or you drink too much alcohol or use tobacco products. Follow-up care is a key part of your treatment and safety. Be sure to make and go to all appointments, and call your doctor if you are having problems. It's also a good idea to know your test results and keep a list of the medicines you take. How can you care for yourself at home? · Practice healthy eating habits. This includes eating plenty of fruits, vegetables, whole grains, lean protein, and low-fat dairy. · If your doctor recommends it, get more exercise. Walking is a good choice. Bit by bit, increase the amount you walk every day. Try for at least 30 minutes on most days of the week. · Do not smoke. Smoking can increase your risk for health problems. If you need help quitting, talk to your doctor about stop-smoking programs and medicines. These can increase your chances of quitting for good. · Limit alcohol to 2 drinks a day for men and 1 drink a day for women. Too much alcohol can cause health problems. If you have a BMI higher than 25 · Your doctor may do other tests to check your risk for weight-related health problems. This may include measuring the distance around your waist. A waist measurement of more than 40 inches in men or 35 inches in women can increase the risk of weight-related health problems. · Talk with your doctor about steps you can take to stay healthy or improve your health. You may need to make lifestyle changes to lose weight and stay healthy, such as changing your diet and getting regular exercise. If you have a BMI lower than 18.5 · Your doctor may do other tests to check your risk for health problems.  
· Talk with your doctor about steps you can take to stay healthy or improve your health. You may need to make lifestyle changes to gain or maintain weight and stay healthy, such as getting more healthy foods in your diet and doing exercises to build muscle. Where can you learn more? Go to http://angela-angelica.info/. Enter S176 in the search box to learn more about \"Body Mass Index: Care Instructions. \" Current as of: October 13, 2016 Content Version: 11.4 © 4458-1398 [employer]. Care instructions adapted under license by [employer] (which disclaims liability or warranty for this information). If you have questions about a medical condition or this instruction, always ask your healthcare professional. [address] any warranty or liability for your use of this information. Introducing [facility]! Darren Christianson introduces Savvify patient portal. Now you can access parts of your medical record, email your doctor's office, and request medication refills online. 1. In your internet browser, go to [email or web address] 2. Click on the First Time User? Click Here link in the Sign In box. You will see the New Member Sign Up page. 3. Enter your Savvify Access Code exactly as it appears below. You will not need to use this code after youve completed the sign-up process. If you do not sign up before the expiration date, you must request a new code. · Savvify Access Code: [email or web address] Expires: 10/24/2018  9:48 AM 
 
4. Enter the last four digits of your Social Security Number (xxxx) and Date of Birth (mm/dd/yyyy) as indicated and click Submit. You will be taken to the next sign-up page. 5. Create a Savvify ID. This will be your Savvify login ID and cannot be changed, so think of one that is secure and easy to remember. 6. Create a Savvify password. You can change your password at any time. 7. Enter your Password Reset Question and Answer.  This can be used at a later time if you forget your password. 8. Enter your e-mail address. You will receive e-mail notification when new information is available in [address] 9. Click Sign Up. You can now view and download portions of your medical record. 10. Click the Download Summary menu link to download a portable copy of your medical information. If you have questions, please visit the Frequently Asked Questions section of the [email or web address] website. Remember, [email or web address] is NOT to be used for urgent needs. For medical emergencies, dial 911. Now available from your iPhone and Android! Please provide this summary of care documentation to your next provider. Your primary care clinician is listed as Juan Guillaume. If you have any questions after today's visit, please call [phone].

## 2018-07-26 NOTE — PATIENT INSTRUCTIONS
Body Mass Index: Care Instructions Your Care Instructions Body mass index (BMI) can help you see if your weight is raising your risk for health problems. It uses a formula to compare how much you weigh with how tall you are. · A BMI lower than 18.5 is considered underweight. · A BMI between 18.5 and 24.9 is considered healthy. · A BMI between 25 and 29.9 is considered overweight. A BMI of 30 or higher is considered obese. If your BMI is in the normal range, it means that you have a lower risk for weight-related health problems. If your BMI is in the overweight or obese range, you may be at increased risk for weight-related health problems, such as high blood pressure, heart disease, stroke, arthritis or joint pain, and diabetes. If your BMI is in the underweight range, you may be at increased risk for health problems such as fatigue, lower protection (immunity) against illness, muscle loss, bone loss, hair loss, and hormone problems. BMI is just one measure of your risk for weight-related health problems. You may be at higher risk for health problems if you are not active, you eat an unhealthy diet, or you drink too much alcohol or use tobacco products. Follow-up care is a key part of your treatment and safety. Be sure to make and go to all appointments, and call your doctor if you are having problems. It's also a good idea to know your test results and keep a list of the medicines you take. How can you care for yourself at home? · Practice healthy eating habits. This includes eating plenty of fruits, vegetables, whole grains, lean protein, and low-fat dairy. · If your doctor recommends it, get more exercise. Walking is a good choice. Bit by bit, increase the amount you walk every day. Try for at least 30 minutes on most days of the week. · Do not smoke. Smoking can increase your risk for health problems. If you need help quitting, talk to your doctor about stop-smoking programs and medicines. These can increase your chances of quitting for good. · Limit alcohol to 2 drinks a day for men and 1 drink a day for women. Too much alcohol can cause health problems. If you have a BMI higher than 25 · Your doctor may do other tests to check your risk for weight-related health problems. This may include measuring the distance around your waist. A waist measurement of more than 40 inches in men or 35 inches in women can increase the risk of weight-related health problems. · Talk with your doctor about steps you can take to stay healthy or improve your health. You may need to make lifestyle changes to lose weight and stay healthy, such as changing your diet and getting regular exercise. If you have a BMI lower than 18.5 · Your doctor may do other tests to check your risk for health problems. · Talk with your doctor about steps you can take to stay healthy or improve your health. You may need to make lifestyle changes to gain or maintain weight and stay healthy, such as getting more healthy foods in your diet and doing exercises to build muscle. Where can you learn more? Go to http://angela-angelica.info/. Enter S176 in the search box to learn more about \"Body Mass Index: Care Instructions. \" Current as of: October 13, 2016 Content Version: 11.4 © 5544-4665 [employer]. Care instructions adapted under license by [employer] (which disclaims liability or warranty for this information). If you have questions about a medical condition or this instruction, always ask your healthcare professional. [address] any warranty or liability for your use of this information.

## 2018-07-27 LAB
ALBUMIN SERPL-MCNC: 4 G/DL (ref 3.6–4.8)
ALBUMIN/GLOB SERPL: 1.6 {RATIO} (ref 1.2–2.2)
ALP SERPL-CCNC: 96 IU/L (ref 39–117)
ALT SERPL-CCNC: 15 IU/L (ref 0–32)
AST SERPL-CCNC: 15 IU/L (ref 0–40)
BILIRUB SERPL-MCNC: 0.5 MG/DL (ref 0–1.2)
BUN SERPL-MCNC: 7 MG/DL (ref 8–27)
BUN/CREAT SERPL: 8 (ref 12–28)
CALCIUM SERPL-MCNC: 9.5 MG/DL (ref 8.7–10.3)
CHLORIDE SERPL-SCNC: 98 MMOL/L (ref 96–106)
CHOLEST SERPL-MCNC: 254 MG/DL (ref 100–199)
CO2 SERPL-SCNC: 27 MMOL/L (ref 20–29)
CREAT SERPL-MCNC: 0.89 MG/DL (ref 0.57–1)
ERYTHROCYTE [DISTWIDTH] IN BLOOD BY AUTOMATED COUNT: 14.9 % (ref 12.3–15.4)
GLOBULIN SER CALC-MCNC: 2.5 G/DL (ref 1.5–4.5)
GLUCOSE SERPL-MCNC: 353 MG/DL (ref 65–99)
HCT VFR BLD AUTO: 38.6 % (ref 34–46.6)
HDLC SERPL-MCNC: 45 MG/DL
HGB BLD-MCNC: 12.2 G/DL (ref 11.1–15.9)
LDLC SERPL CALC-MCNC: 164 MG/DL (ref 0–99)
MCH RBC QN AUTO: 30.3 PG (ref 26.6–33)
MCHC RBC AUTO-ENTMCNC: 31.6 G/DL (ref 31.5–35.7)
MCV RBC AUTO: 96 FL (ref 79–97)
PLATELET # BLD AUTO: 287 X10E3/UL (ref 150–379)
POTASSIUM SERPL-SCNC: 4.6 MMOL/L (ref 3.5–5.2)
PROT SERPL-MCNC: 6.5 G/DL (ref 6–8.5)
RBC # BLD AUTO: 4.02 X10E6/UL (ref 3.77–5.28)
SODIUM SERPL-SCNC: 140 MMOL/L (ref 134–144)
TRIGL SERPL-MCNC: 225 MG/DL (ref 0–149)
TSH SERPL DL<=0.005 MIU/L-ACNC: 1.87 UIU/ML (ref 0.45–4.5)
VLDLC SERPL CALC-MCNC: 45 MG/DL (ref 5–40)
WBC # BLD AUTO: 10.3 X10E3/UL (ref 3.4–10.8)

## 2018-10-11 ENCOUNTER — OFFICE VISIT (OUTPATIENT)
Dept: FAMILY MEDICINE CLINIC | Age: 63
End: 2018-10-11

## 2018-10-11 VITALS
WEIGHT: 171.6 LBS | DIASTOLIC BLOOD PRESSURE: 75 MMHG | BODY MASS INDEX: 30.41 KG/M2 | OXYGEN SATURATION: 95 % | HEIGHT: 63 IN | SYSTOLIC BLOOD PRESSURE: 138 MMHG | TEMPERATURE: 96.7 F | RESPIRATION RATE: 16 BRPM | HEART RATE: 90 BPM

## 2018-10-11 DIAGNOSIS — I10 ESSENTIAL HYPERTENSION: ICD-10-CM

## 2018-10-11 DIAGNOSIS — J44.9 COPD (CHRONIC OBSTRUCTIVE PULMONARY DISEASE) WITH CHRONIC BRONCHITIS (HCC): Primary | ICD-10-CM

## 2018-10-11 DIAGNOSIS — F41.1 GAD (GENERALIZED ANXIETY DISORDER): ICD-10-CM

## 2018-10-11 DIAGNOSIS — J20.9 ACUTE BRONCHITIS, UNSPECIFIED ORGANISM: ICD-10-CM

## 2018-10-11 DIAGNOSIS — J45.20 MILD INTERMITTENT ASTHMA WITHOUT COMPLICATION: ICD-10-CM

## 2018-10-11 DIAGNOSIS — R25.1 TREMOR OF FACE AND HANDS: ICD-10-CM

## 2018-10-11 DIAGNOSIS — F41.0 PANIC ANXIETY SYNDROME: ICD-10-CM

## 2018-10-11 RX ORDER — ROSUVASTATIN CALCIUM 10 MG/1
10 TABLET, COATED ORAL
Qty: 30 TAB | Refills: 6 | Status: SHIPPED | OUTPATIENT
Start: 2018-10-11 | End: 2019-06-21 | Stop reason: SDUPTHER

## 2018-10-11 RX ORDER — LORAZEPAM 0.5 MG/1
TABLET ORAL
Qty: 30 TAB | Refills: 3 | Status: SHIPPED | OUTPATIENT
Start: 2018-10-11 | End: 2019-05-03 | Stop reason: SDUPTHER

## 2018-10-11 NOTE — PROGRESS NOTES
HISTORY OF PRESENT ILLNESS José Luis Ray is a 58 y.o. female. HPI  
DMtype II Not Compliant w/ meds, was given janumet bid but caused leg pain for her and therefore stopped she is having diabetic diet, and not doing much of daily exercise, obtains home glucose monitoring averaging  140's  . No Rf needed for today. Denies any tingling sensation, polyurea and polydipsia, last a1c was not at target >11%%  Last urine microalbumin 2015 and was normal, no etoh but ++cigs, it was the time that she was givne also a lot of steroids in the past, Insomnia Pt states that she is not suicidal and not homicidal, in addition, Ms REYES has not been feeling anxious, states that her problem is not falling asleep, the problem is staying asleep,  it gets 2-3 Hours Of sleep, then the pt is up for another 1-2 hours, Then  goes back to sleep,  she has tried some over-the-counter sleeping and No over-the-counter medication helped this patient so for,  No hx of sleep apnea, no daily fatigue no trouble with her TSH, not an anxious person,denies restless legs, no caffienated drink 7 hrs before bed time, she does not exercise for to 4 hours before the bedtime, this has not been worsening for the last few months w/ the current meds, and states that she knows that this med has addictive problem and cannot drink any Etoh with it, and has to take it only in the bed, and is a dangerous med to be taken outside of the bed Gets anxious as well unable to control it then she tremors if she does not take her meds, Feeling better since the last visit. Current Outpatient Prescriptions Medication Sig Dispense Refill  losartan-hydroCHLOROthiazide (HYZAAR) 100-25 mg per tablet Take 1 Tab by mouth daily. 30 Tab 5  
 budesonide-formoterol (SYMBICORT) 160-4.5 mcg/actuation HFAA Take 2 Puffs by inhalation two (2) times a day. 1 Inhaler 5  
 metoprolol succinate (TOPROL-XL) 25 mg XL tablet take one-half tablet by mouth daily 45 Tab 5  cholecalciferol (VITAMIN D3) 1,000 unit tablet Take 1 Tab by mouth daily. 30 Tab 6  
 albuterol (PROVENTIL, VENTOLIN) 90 mcg/Actuation inhaler Take 1-2 Puffs by inhalation every four (4) hours as needed for Wheezing. 17 g 0  
 ALBUTEROL SULFATE IN Take 0.5 mL by inhalation three (3) times daily as needed.  doxycycline (VIBRA-TABS) 100 mg tablet doxycycline hyclate 100 mg tablet  enalapril (VASOTEC) 10 mg tablet enalapril maleate 10 mg tablet  LORazepam (ATIVAN) 0.5 mg tablet lorazepam 0.5 mg tablet  nicotine (NICODERM CQ) 14 mg/24 hr patch nicotine 14 mg/24 hr daily transdermal patch    
 nicotine (NICODERM CQ) 21 mg/24 hr nicotine 21 mg/24 hr daily transdermal patch    
 nicotine (NICODERM CQ) 7 mg/24 hr nicotine 7 mg/24 hr daily transdermal patch    
 nitroglycerin (NITROSTAT) 0.3 mg SL tablet nitroglycerin 0.3 mg sublingual tablet  predniSONE (DELTASONE) 10 mg tablet Administer 3 tablets now  predniSONE (DELTASONE) 20 mg tablet prednisone 20 mg tablet  rosuvastatin (CRESTOR) 5 mg tablet rosuvastatin 5 mg tablet  tiotropium (SPIRIVA WITH HANDIHALER) 18 mcg inhalation capsule Spiriva with HandiHaler 18 mcg and inhalation capsules  atorvastatin (LIPITOR) 20 mg tablet Take 1 Tab by mouth daily. (Patient not taking: Reported on 10/11/2018) 30 Tab 5  
 albuterol-ipratropium (DUO-NEB) 2.5 mg-0.5 mg/3 ml nebu 3 mL by Nebulization route every four (4) hours as needed. 60 Nebule 4  
 albuterol (PROVENTIL VENTOLIN) 2.5 mg /3 mL (0.083 %) nebulizer solution 3 mL by Nebulization route every four (4) hours as needed for Wheezing. (Patient not taking: Reported on 10/11/2018) 24 Each 0  
 hydrocortisone (HYTONE) 2.5 % topical cream Apply  to affected area two (2) times a day. use thin layer (Patient not taking: Reported on 10/11/2018) 30 g 5  cloNIDine HCl (CATAPRES) 0.2 mg tablet Take 1 Tab by mouth nightly. 30 Tab 2 No Known Allergies Past Medical History: Diagnosis Date  Acute bronchitis 4/7/2016  Atopic dermatitis 4/7/2016  Chronic obstructive pulmonary disease ([facility]  COPD (chronic obstructive pulmonary disease) with chronic bronchitis ([facility] 7/26/2018  Essential hypertension 3/31/2016  Eye problems 3/31/2016 History reviewed. No pertinent surgical history. Family History Problem Relation Age of Onset  Diabetes Father  Arthritis-osteo Sister  Diabetes Sister Nena Nunez Bladder Disease Sister Social History Substance Use Topics  Smoking status: Current Every Day Smoker Types: Cigarettes  Smokeless tobacco: Never Used  Alcohol use No  
  
Lab Results Component Value Date/Time WBC 10.3 07/26/2018 09:53 AM  
HGB 12.2 07/26/2018 09:53 AM  
HCT 38.6 07/26/2018 09:53 AM  
PLATELET 867 74/68/4658 09:53 AM  
MCV 96 07/26/2018 09:53 AM  
 
Lab Results Component Value Date/Time Glucose 353 (H) 07/26/2018 09:53 AM  
LDL, calculated 164 (H) 07/26/2018 09:53 AM  
Creatinine (POC) 1.0 01/17/2012 11:11 AM  
Creatinine 0.89 07/26/2018 09:53 AM  
   
Review of Systems Constitutional: Negative for chills and fever. HENT: Negative for ear pain and nosebleeds. Eyes: Negative for blurred vision, pain and discharge. Respiratory: Negative for shortness of breath. Cardiovascular: Negative for chest pain and leg swelling. Gastrointestinal: Negative for constipation, diarrhea, nausea and vomiting. Genitourinary: Negative for frequency. Musculoskeletal: Negative for joint pain. Skin: Negative for itching and rash. Neurological: Negative for headaches. Psychiatric/Behavioral: Negative for depression. The patient is not nervous/anxious. Physical Exam  
Constitutional: She is oriented to person, place, and time. She appears well-developed and well-nourished. HENT:  
Head: Normocephalic and atraumatic. Eyes: Conjunctivae and EOM are normal. Pupils are equal, round, and reactive to light. Neck: No JVD present. No thyromegaly present. Cardiovascular: Normal rate, regular rhythm, normal heart sounds and intact distal pulses. Exam reveals no gallop and no friction rub. No murmur heard. Pulmonary/Chest: Effort normal and breath sounds normal. No stridor. No respiratory distress. She has no wheezes. She has no rales. Abdominal: Soft. Bowel sounds are normal. She exhibits no distension and no mass. There is no tenderness. Musculoskeletal: Normal range of motion. She exhibits no edema or tenderness. Lymphadenopathy:  
  She has no cervical adenopathy. Neurological: She is alert and oriented to person, place, and time. She has normal reflexes. No cranial nerve deficit. Skin: No rash noted. No erythema. Psychiatric: She has a normal mood and affect. Her behavior is normal.  
Nursing note and vitals reviewed. ASSESSMENT and PLAN Diagnoses and all orders for this visit: 
 
1. COPD (chronic obstructive pulmonary disease) with chronic bronchitis (HCC) 
-     rosuvastatin (CRESTOR) 10 mg tablet; Take 1 Tab by mouth nightly. -     LORazepam (ATIVAN) 0.5 mg tablet; One tablet nightly  Indications: anxiety, Insomnia, Muscle Spasm -     AMB POC HEMOGLOBIN A1C; Future 
-     TSH 3RD GENERATION; Future 2. EVER (generalized anxiety disorder) -     AMB POC HEMOGLOBIN A1C; Future 
-     TSH 3RD GENERATION; Future 3. Panic anxiety syndrome -     AMB POC HEMOGLOBIN A1C; Future 
-     TSH 3RD GENERATION; Future 4. Tremor of face and hands -     AMB POC HEMOGLOBIN A1C; Future 
-     TSH 3RD GENERATION; Future 5. Essential hypertension 
-     rosuvastatin (CRESTOR) 10 mg tablet; Take 1 Tab by mouth nightly. -     LORazepam (ATIVAN) 0.5 mg tablet; One tablet nightly  Indications: anxiety, Insomnia, Muscle Spasm -     AMB POC HEMOGLOBIN A1C; Future 
-     TSH 3RD GENERATION; Future 6. Mild intermittent asthma without complication -     rosuvastatin (CRESTOR) 10 mg tablet; Take 1 Tab by mouth nightly. -     LORazepam (ATIVAN) 0.5 mg tablet; One tablet nightly  Indications: anxiety, Insomnia, Muscle Spasm -     AMB POC HEMOGLOBIN A1C; Future 
-     TSH 3RD GENERATION; Future 7. Acute bronchitis, unspecified organism 
-     rosuvastatin (CRESTOR) 10 mg tablet; Take 1 Tab by mouth nightly. -     LORazepam (ATIVAN) 0.5 mg tablet; One tablet nightly  Indications: anxiety, Insomnia, Muscle Spasm -     AMB POC HEMOGLOBIN A1C; Future 
-     TSH 3RD GENERATION; Future

## 2019-03-22 ENCOUNTER — OFFICE VISIT (OUTPATIENT)
Dept: FAMILY MEDICINE CLINIC | Age: 64
End: 2019-03-22

## 2019-03-22 VITALS
WEIGHT: 165.2 LBS | DIASTOLIC BLOOD PRESSURE: 88 MMHG | TEMPERATURE: 97.3 F | BODY MASS INDEX: 29.27 KG/M2 | HEART RATE: 71 BPM | RESPIRATION RATE: 18 BRPM | HEIGHT: 63 IN | SYSTOLIC BLOOD PRESSURE: 159 MMHG | OXYGEN SATURATION: 95 %

## 2019-03-22 DIAGNOSIS — J44.9 COPD (CHRONIC OBSTRUCTIVE PULMONARY DISEASE) WITH CHRONIC BRONCHITIS (HCC): ICD-10-CM

## 2019-03-22 DIAGNOSIS — R82.90 ABNORMAL FINDING IN URINE: ICD-10-CM

## 2019-03-22 DIAGNOSIS — Z76.89 ENCOUNTER TO ESTABLISH CARE: Primary | ICD-10-CM

## 2019-03-22 DIAGNOSIS — I10 ESSENTIAL HYPERTENSION: ICD-10-CM

## 2019-03-22 DIAGNOSIS — G89.29 CHRONIC BILATERAL LOW BACK PAIN WITHOUT SCIATICA: ICD-10-CM

## 2019-03-22 DIAGNOSIS — E11.42 TYPE 2 DIABETES MELLITUS WITH DIABETIC POLYNEUROPATHY, WITHOUT LONG-TERM CURRENT USE OF INSULIN (HCC): ICD-10-CM

## 2019-03-22 DIAGNOSIS — M54.50 CHRONIC BILATERAL LOW BACK PAIN WITHOUT SCIATICA: ICD-10-CM

## 2019-03-22 DIAGNOSIS — Z72.0 TOBACCO ABUSE: ICD-10-CM

## 2019-03-22 DIAGNOSIS — Z85.3 HISTORY OF LEFT BREAST CANCER: ICD-10-CM

## 2019-03-22 LAB
BILIRUB UR QL STRIP: NEGATIVE
GLUCOSE UR-MCNC: NEGATIVE MG/DL
HBA1C MFR BLD HPLC: 11.4 %
KETONES P FAST UR STRIP-MCNC: NEGATIVE MG/DL
PH UR STRIP: 5.5 [PH] (ref 4.6–8)
PROT UR QL STRIP: NEGATIVE
SP GR UR STRIP: 1 (ref 1–1.03)
UA UROBILINOGEN AMB POC: NORMAL (ref 0.2–1)
URINALYSIS CLARITY POC: CLEAR
URINALYSIS COLOR POC: YELLOW
URINE BLOOD POC: NORMAL
URINE LEUKOCYTES POC: NORMAL
URINE NITRITES POC: NEGATIVE

## 2019-03-22 RX ORDER — LIDOCAINE 50 MG/G
PATCH TOPICAL
Qty: 30 PATCH | Refills: 5 | Status: SHIPPED | OUTPATIENT
Start: 2019-03-22 | End: 2019-11-08 | Stop reason: SDUPTHER

## 2019-03-22 RX ORDER — IPRATROPIUM BROMIDE AND ALBUTEROL SULFATE 2.5; .5 MG/3ML; MG/3ML
3 SOLUTION RESPIRATORY (INHALATION)
Qty: 75 NEBULE | Refills: 11 | Status: SHIPPED | OUTPATIENT
Start: 2019-03-22 | End: 2019-10-25 | Stop reason: SDUPTHER

## 2019-03-22 RX ORDER — IBUPROFEN 200 MG
1 TABLET ORAL EVERY 24 HOURS
Qty: 30 PATCH | Refills: 2 | Status: SHIPPED | OUTPATIENT
Start: 2019-03-22 | End: 2019-04-21

## 2019-03-22 RX ORDER — INSULIN PUMP SYRINGE, 3 ML
EACH MISCELLANEOUS
Qty: 1 KIT | Refills: 0 | Status: SHIPPED | OUTPATIENT
Start: 2019-03-22

## 2019-03-22 RX ORDER — MELATONIN
1000 DAILY
COMMUNITY

## 2019-03-22 RX ORDER — LANCETS
EACH MISCELLANEOUS
Qty: 100 EACH | Refills: 11 | Status: SHIPPED | OUTPATIENT
Start: 2019-03-22

## 2019-03-22 RX ORDER — LOSARTAN POTASSIUM AND HYDROCHLOROTHIAZIDE 25; 100 MG/1; MG/1
1 TABLET ORAL DAILY
Qty: 30 TAB | Refills: 5 | Status: SHIPPED | OUTPATIENT
Start: 2019-03-22 | End: 2019-10-04 | Stop reason: SDUPTHER

## 2019-03-22 RX ORDER — ALBUTEROL SULFATE 90 UG/1
2 AEROSOL, METERED RESPIRATORY (INHALATION)
Qty: 1 INHALER | Refills: 11 | Status: SHIPPED | OUTPATIENT
Start: 2019-03-22 | End: 2020-07-02

## 2019-03-22 RX ORDER — GABAPENTIN 100 MG/1
100 CAPSULE ORAL 3 TIMES DAILY
Qty: 90 CAP | Refills: 2 | Status: SHIPPED | OUTPATIENT
Start: 2019-03-22 | End: 2019-06-21 | Stop reason: SDUPTHER

## 2019-03-22 NOTE — PROGRESS NOTES
Chief Complaint Patient presents with [facility] Pt concerned with leg pains and cramps. Pt requests gabapentin. Pt is on oxygen at home. 1. Have you been to the ER, urgent care clinic since your last visit? Hospitalized since your last visit? No 
 
2. Have you seen or consulted any other health care providers outside of the [address] [place] [address] since your last visit? Include any pap smears or colon screening. No 
 
Health Maintenance Due Topic Date Due  
 Hepatitis C Screening  1955  Pneumococcal 0-64 years (1 of 1 - PPSV23) 12/22/1961  
 FOOT EXAM Q1  12/22/1965  MICROALBUMIN Q1  12/22/1965  
 EYE EXAM RETINAL OR DILATED  12/22/1965  DTaP/Tdap/Td series (1 - Tdap) 12/22/1976  PAP AKA CERVICAL CYTOLOGY  12/22/1976  Shingrix Vaccine Age 50> (1 of 2) 12/22/2005  FOBT Q 1 YEAR AGE 50-75  12/22/2005  BREAST CANCER SCRN MAMMOGRAM  01/17/2014  Influenza Age 5 to Adult  08/01/2018  HEMOGLOBIN A1C Q6M  01/26/2019

## 2019-03-22 NOTE — PATIENT INSTRUCTIONS
Diabetes Foot Health: Care Instructions Your Care Instructions When you have diabetes, your feet need extra care and attention. Diabetes can damage the nerve endings and blood vessels in your feet, making you less likely to notice when your feet are injured. Diabetes also limits your body's ability to fight infection and get blood to areas that need it. If you get a minor foot injury, it could become an ulcer or a serious infection. With good foot care, you can prevent most of these problems. Caring for your feet can be quick and easy. Most of the care can be done when you are bathing or getting ready for bed. Follow-up care is a key part of your treatment and safety. Be sure to make and go to all appointments, and call your doctor if you are having problems. It's also a good idea to know your test results and keep a list of the medicines you take. How can you care for yourself at home? · Keep your blood sugar close to normal by watching what and how much you eat, monitoring blood sugar, taking medicines if prescribed, and getting regular exercise. · Do not smoke. Smoking affects blood flow and can make foot problems worse. If you need help quitting, talk to your doctor about stop-smoking programs and medicines. These can increase your chances of quitting for good. · Eat a diet that is low in fats. High fat intake can cause fat to build up in your blood vessels and decrease blood flow. · Inspect your feet daily for blisters, cuts, cracks, or sores. If you cannot see well, use a mirror or have someone help you. · Take care of your feet: 
? Wash your feet every day. Use warm (not hot) water. Check the water temperature with your wrists or other part of your body, not your feet. ? Dry your feet well. Pat them dry. Do not rub the skin on your feet too hard. Dry well between your toes. If the skin on your feet stays moist, bacteria or a fungus can grow, which can lead to infection. ? Keep your skin soft. Use moisturizing skin cream to keep the skin on your feet soft and prevent calluses and cracks. But do not put the cream between your toes, and stop using any cream that causes a rash. ? Clean underneath your toenails carefully. Do not use a sharp object to clean underneath your toenails. Use the blunt end of a nail file or other rounded tool. ? Trim and file your toenails straight across to prevent ingrown toenails. Use a nail clipper, not scissors. Use an emery board to smooth the edges. · Change socks daily. Socks without seams are best, because seams often rub the feet. You can find socks for people with diabetes from specialty catalogs. · Look inside your shoes every day for things like gravel or torn linings, which could cause blisters or sores. · Buy shoes that fit well: 
? Look for shoes that have plenty of space around the toes. This helps prevent bunions and blisters. ? Try on shoes while wearing the kind of socks you will usually wear with the shoes. ? Avoid plastic shoes. They may rub your feet and cause blisters. Good shoes should be made of materials that are flexible and breathable, such as leather or cloth. ? Break in new shoes slowly by wearing them for no more than an hour a day for several days. Take extra time to check your feet for red areas, blisters, or other problems after you wear new shoes. · Do not go barefoot. Do not wear sandals, and do not wear shoes with very thin soles. Thin soles are easy to puncture. They also do not protect your feet from hot pavement or cold weather. · Have your doctor check your feet during each visit. If you have a foot problem, see your doctor. Do not try to treat an early foot problem at home. Home remedies or treatments that you can buy without a prescription (such as corn removers) can be harmful. · Always get early treatment for foot problems. A minor irritation can lead to a major problem if not properly cared for early. When should you call for help? Call your doctor now or seek immediate medical care if: 
  · You have a foot sore, an ulcer or break in the skin that is not healing after 4 days, bleeding corns or calluses, or an ingrown toenail.  
  · You have blue or black areas, which can mean bruising or blood flow problems.  
  · You have peeling skin or tiny blisters between your toes or cracking or oozing of the skin.  
  · You have a fever for more than 24 hours and a foot sore.  
  · You have new numbness or tingling in your feet that does not go away after you move your feet or change positions.  
  · You have unexplained or unusual swelling of the foot or ankle.  
 Watch closely for changes in your health, and be sure to contact your doctor if: 
  · You cannot do proper foot care. Where can you learn more? Go to http://angela-angelica.info/. Enter A739 in the search box to learn more about \"Diabetes Foot Health: Care Instructions. \" Current as of: July 25, 2018 Content Version: 11.9 © 2277-1440 [employer]. Care instructions adapted under license by [employer] (which disclaims liability or warranty for this information). If you have questions about a medical condition or this instruction, always ask your healthcare professional. [address] any warranty or liability for your use of this information.

## 2019-03-22 NOTE — PROGRESS NOTES
HISTORY OF PRESENT ILLNESS  Maria Ga is a 61 y.o. female. HPI  Patient comes in today to establish care. COPD on oxygen - wears when her oxygen reads low. Only has home concentrator. Trying to quit smoking. Smokes 1.5 ppd. No pulmonary doctor. Using albuterol twice daily. Uses symbicort twice daily. Did not like spiriva. Has not had any PFTs  Hx of diabetes. Started taking Janumet 2 days ago. Does not check sugars at home. Does not have meter at home. Declines referral to our pharamcist who is a diabetes educator for nutrition guidance and how to use glucometer  Hx of breast cancer over 10 years ago . Hx left lumpectomy. did radiation. Radiation at [address] Oncologist - Dr. Yaritza Billy. Needs mammogram  Legs ache all the time. Wears back patches to help with leg pain. Pain starts in back, radiating down legs. No back xray. States she hurt back twisted to get in bed. Feet were red across balls of foot and heel. States she does not have hx of gout. No Known Allergies    Past Medical History:   Diagnosis Date    Acute bronchitis 4/7/2016    Atopic dermatitis 4/7/2016    Chronic obstructive pulmonary disease (HCC)     COPD (chronic obstructive pulmonary disease) with chronic bronchitis ([facility] [address] 7/26/2018    Essential hypertension 3/31/2016    Eye problems 3/31/2016    EVER (generalized anxiety disorder) 10/11/2018    Panic anxiety syndrome 10/11/2018    Tremor of face and hands 10/11/2018       History reviewed. No pertinent surgical history.     Social History     Socioeconomic History    Marital status:      Spouse name: Not on file    Number of children: Not on file    Years of education: Not on file    Highest education level: Not on file   Occupational History    Not on file   Social Needs    Financial resource strain: Not on file    Food insecurity:     Worry: Not on file     Inability: Not on file    Transportation needs:     Medical: Not on file Non-medical: Not on file   Tobacco Use    Smoking status: Current Every Day Smoker     Packs/day: 1.50     Types: Cigarettes    Smokeless tobacco: Never Used   Substance and Sexual Activity    Alcohol use: No    Drug use: No    Sexual activity: Not Currently   Lifestyle    Physical activity:     Days per week: Not on file     Minutes per session: Not on file    Stress: Not on file   Relationships    Social connections:     Talks on phone: Not on file     Gets together: Not on file     Attends Sabianism service: Not on file     Active member of club or organization: Not on file     Attends meetings of clubs or organizations: Not on file     Relationship status: Not on file    Intimate partner violence:     Fear of current or ex partner: Not on file     Emotionally abused: Not on file     Physically abused: Not on file     Forced sexual activity: Not on file   Other Topics Concern    Not on file   Social History Narrative    Not on file       Family History   Problem Relation Age of Onset    Diabetes Father     Arthritis-osteo Sister     Diabetes Sister    Davy Busing Bladder Disease Sister        Current Outpatient Medications   Medication Sig    SITagliptin-metFORMIN (JANUMET)  mg per tablet Take 1 Tab by mouth two (2) times daily (with meals).  cholecalciferol (VITAMIN D3) 1,000 unit tablet Take  by mouth daily.  LORazepam (ATIVAN) 0.5 mg tablet One tablet nightly  Indications: anxiety, Insomnia, Muscle Spasm    tiotropium (SPIRIVA WITH HANDIHALER) 18 mcg inhalation capsule Spiriva with HandiHaler 18 mcg and inhalation capsules    losartan-hydroCHLOROthiazide (HYZAAR) 100-25 mg per tablet Take 1 Tab by mouth daily.  budesonide-formoterol (SYMBICORT) 160-4.5 mcg/actuation HFAA Take 2 Puffs by inhalation two (2) times a day.     metoprolol succinate (TOPROL-XL) 25 mg XL tablet take one-half tablet by mouth daily    albuterol-ipratropium (DUO-NEB) 2.5 mg-0.5 mg/3 ml nebu 3 mL by Nebulization route every four (4) hours as needed.  albuterol (PROVENTIL VENTOLIN) 2.5 mg /3 mL (0.083 %) nebulizer solution 3 mL by Nebulization route every four (4) hours as needed for Wheezing.  albuterol (PROVENTIL, VENTOLIN) 90 mcg/Actuation inhaler Take 1-2 Puffs by inhalation every four (4) hours as needed for Wheezing.  rosuvastatin (CRESTOR) 10 mg tablet Take 1 Tab by mouth nightly. (Patient not taking: Reported on 3/22/2019)    ALBUTEROL SULFATE IN Take 0.5 mL by inhalation three (3) times daily as needed.  doxycycline (VIBRA-TABS) 100 mg tablet doxycycline hyclate 100 mg tablet    enalapril (VASOTEC) 10 mg tablet enalapril maleate 10 mg tablet    nicotine (NICODERM CQ) 14 mg/24 hr patch nicotine 14 mg/24 hr daily transdermal patch    nicotine (NICODERM CQ) 21 mg/24 hr nicotine 21 mg/24 hr daily transdermal patch    nicotine (NICODERM CQ) 7 mg/24 hr nicotine 7 mg/24 hr daily transdermal patch    nitroglycerin (NITROSTAT) 0.3 mg SL tablet nitroglycerin 0.3 mg sublingual tablet    predniSONE (DELTASONE) 10 mg tablet Administer 3 tablets now    predniSONE (DELTASONE) 20 mg tablet prednisone 20 mg tablet    hydrocortisone (HYTONE) 2.5 % topical cream Apply  to affected area two (2) times a day. use thin layer (Patient not taking: Reported on 10/11/2018)    cloNIDine HCl (CATAPRES) 0.2 mg tablet Take 1 Tab by mouth nightly. (Patient not taking: Reported on 3/22/2019)    cholecalciferol (VITAMIN D3) 1,000 unit tablet Take 1 Tab by mouth daily. (Patient not taking: Reported on 3/22/2019)     No current facility-administered medications for this visit. Review of Systems   Constitutional: Negative for chills and fever. HENT: Negative for ear pain and nosebleeds. Eyes: Negative for blurred vision. Respiratory: Positive for cough, sputum production, shortness of breath and wheezing. Cardiovascular: Negative for chest pain, palpitations and leg swelling.    Gastrointestinal: Negative for blood in stool, constipation, diarrhea, melena, nausea and vomiting. Genitourinary: Negative for dysuria, frequency and urgency. Musculoskeletal: Positive for back pain, joint pain and myalgias. Skin: Negative for itching and rash. Neurological: Positive for sensory change (burning pain in feet). Negative for focal weakness and headaches. Endo/Heme/Allergies: Negative for polydipsia. Psychiatric/Behavioral: Negative for depression. The patient is not nervous/anxious. Vitals:    03/22/19 1249   BP: 159/88   Pulse: 71   Resp: 18   Temp: 97.3 °F (36.3 °C)   TempSrc: Oral   SpO2: 95%   Weight: 165 lb 3.2 oz (74.9 kg)   Height: 5' 3\" (1.6 m)     Physical Exam   Constitutional: She is oriented to person, place, and time. Vital signs are normal. She appears well-developed and well-nourished. She is cooperative. HENT:   Right Ear: Hearing, tympanic membrane, external ear and ear canal normal.   Left Ear: Hearing, tympanic membrane, external ear and ear canal normal.   Nose: Nose normal.   Mouth/Throat: Uvula is midline, oropharynx is clear and moist and mucous membranes are normal.   Neck: No thyromegaly present. Cardiovascular: Normal rate, regular rhythm, S1 normal, S2 normal and normal heart sounds. No murmur heard. Pulses:       Dorsalis pedis pulses are 2+ on the right side, and 2+ on the left side. No edema noted   Pulmonary/Chest: Effort normal and breath sounds normal.   Congested cough   Abdominal: Soft. Normal appearance and bowel sounds are normal. There is no hepatosplenomegaly. There is no tenderness. Musculoskeletal: Normal range of motion. Lymphadenopathy:     She has no cervical adenopathy. Right: No supraclavicular adenopathy present. Left: No supraclavicular adenopathy present. Neurological: She is alert and oriented to person, place, and time. Skin: Skin is warm, dry and intact. Psychiatric: She has a normal mood and affect.  Her speech is normal and behavior is normal. Thought content normal.   Vitals reviewed. ASSESSMENT and PLAN    ICD-10-CM ICD-9-CM    1. Encounter to establish care Z76.89 V65.8    2. Type 2 diabetes mellitus with diabetic polyneuropathy, without long-term current use of insulin (HCC) E11.42 250.60 CBC WITH AUTOMATED DIFF     746.6 METABOLIC PANEL, COMPREHENSIVE      LIPID PANEL      AMB POC HEMOGLOBIN A1C      AMB POC URINALYSIS DIP STICK AUTO W/O MICRO      MICROALBUMIN, UR, RAND W/ MICROALB/CREAT RATIO      TSH 3RD GENERATION      VITAMIN D, 25 HYDROXY      gabapentin (NEURONTIN) 100 mg capsule      Blood-Glucose Meter monitoring kit      lancets misc      glucose blood VI test strips (BLOOD GLUCOSE TEST) strip   3. Essential hypertension I10 401.9 losartan-hydroCHLOROthiazide (HYZAAR) 100-25 mg per tablet      albuterol-ipratropium (DUO-NEB) 2.5 mg-0.5 mg/3 ml nebu      CBC WITH AUTOMATED DIFF      METABOLIC PANEL, COMPREHENSIVE      LIPID PANEL      AMB POC URINALYSIS DIP STICK AUTO W/O MICRO      TSH 3RD GENERATION   4. COPD (chronic obstructive pulmonary disease) with chronic bronchitis (HCC) J44.9 491.20 albuterol (PROVENTIL HFA, VENTOLIN HFA, PROAIR HFA) 90 mcg/actuation inhaler      REFERRAL TO PULMONARY DISEASE   5. Chronic bilateral low back pain without sciatica M54.5 724.2 XR SPINE LUMB 2 OR 3 V    G89.29 338.29 lidocaine (LIDODERM) 5 %      gabapentin (NEURONTIN) 100 mg capsule   6. History of left breast cancer Z85.3 V10.3 JULIO MAMMO BI SCREENING INCL CAD   7. Tobacco abuse Z72.0 305.1 nicotine (NICODERM CQ) 14 mg/24 hr patch   8. Abnormal finding in urine R82.90 791.9 CULTURE, URINE     Encounter Diagnoses   Name Primary?     Encounter to establish care Yes    Type 2 diabetes mellitus with diabetic polyneuropathy, without long-term current use of insulin (HCC)     Essential hypertension     COPD (chronic obstructive pulmonary disease) with chronic bronchitis (HCC)     Chronic bilateral low back pain without sciatica     History of left breast cancer     Tobacco abuse     Abnormal finding in urine      Orders Placed This Encounter    CULTURE, URINE    JULIO MAMMO BI SCREENING INCL CAD    XR SPINE LUMB 2 OR 3 V    CBC WITH AUTOMATED DIFF    METABOLIC PANEL, COMPREHENSIVE    LIPID PANEL    MICROALBUMIN, UR, RAND W/ MICROALB/CREAT RATIO    TSH 3RD GENERATION    VITAMIN D, 25 HYDROXY    CVD REPORT    DIABETES PATIENT EDUCATION    DIABETES PATIENT EDUCATION    REFERRAL TO PULMONARY DISEASE    AMB POC HEMOGLOBIN A1C    AMB POC URINALYSIS DIP STICK AUTO W/O MICRO    SITagliptin-metFORMIN (JANUMET)  mg per tablet    cholecalciferol (VITAMIN D3) 1,000 unit tablet    losartan-hydroCHLOROthiazide (HYZAAR) 100-25 mg per tablet    albuterol-ipratropium (DUO-NEB) 2.5 mg-0.5 mg/3 ml nebu    albuterol (PROVENTIL HFA, VENTOLIN HFA, PROAIR HFA) 90 mcg/actuation inhaler    lidocaine (LIDODERM) 5 %    gabapentin (NEURONTIN) 100 mg capsule    nicotine (NICODERM CQ) 14 mg/24 hr patch    Blood-Glucose Meter monitoring kit    lancets misc    glucose blood VI test strips (BLOOD GLUCOSE TEST) strip     Diagnoses and all orders for this visit:    1. Encounter to establish care    2. Type 2 diabetes mellitus with diabetic polyneuropathy, without long-term current use of insulin ([facility]) - A1c 11.4%!! Patient just started taking Janumet 1 week ago. Encouraged patient to aim for 45g carbohydrate per meal, eliminate the juice and all sweet drinks. Patient to monitor BG twice daily, fasting daily and varying times before lunch or dinner, and bedtime. .  Discussed long term effects of uncontrolled diabetes, including but not limited to stroke, heart attack, kidney failure, blindness, amputation.   Discussed with patient that diabetic neuropathy should improve with better BG control.  -     CBC WITH AUTOMATED DIFF  -     METABOLIC PANEL, COMPREHENSIVE  -     LIPID PANEL  -     AMB POC HEMOGLOBIN A1C  -     AMB POC URINALYSIS DIP STICK AUTO W/O MICRO  -     MICROALBUMIN, UR, RAND W/ MICROALB/CREAT RATIO  -     TSH 3RD GENERATION  -     VITAMIN D, 25 HYDROXY  -     gabapentin (NEURONTIN) 100 mg capsule; Take 1 Cap by mouth three (3) times daily.  -     Blood-Glucose Meter monitoring kit; Monitor BG once daily and as needed  Dx: E11.42  Provide with glucometer that insurance covers. -     lancets misc; Monitor BG once daily and as needed  Dx: E11.42  Provide with glucometer/lancets that insurance covers. -     glucose blood VI test strips (BLOOD GLUCOSE TEST) strip; Monitor BG once daily and as needed  Dx: E11.42  Provide with glucometer/strips that insurance covers. 3. Essential hypertension - refilled medication  -     losartan-hydroCHLOROthiazide (HYZAAR) 100-25 mg per tablet; Take 1 Tab by mouth daily. -     albuterol-ipratropium (DUO-NEB) 2.5 mg-0.5 mg/3 ml nebu; 3 mL by Nebulization route every four (4) hours as needed (for wheezing and shortness of breath). -     CBC WITH AUTOMATED DIFF  -     METABOLIC PANEL, COMPREHENSIVE  -     LIPID PANEL  -     AMB POC URINALYSIS DIP STICK AUTO W/O MICRO  -     TSH 3RD GENERATION    4. COPD (chronic obstructive pulmonary disease) with chronic bronchitis (HCC)  -     albuterol (PROVENTIL HFA, VENTOLIN HFA, PROAIR HFA) 90 mcg/actuation inhaler; Take 2 Puffs by inhalation every four (4) hours as needed for Wheezing or Shortness of Breath.  -     REFERRAL TO PULMONARY DISEASE    5. Chronic bilateral low back pain without sciatica  -     XR SPINE LUMB 2 OR 3 V; Future  -     lidocaine (LIDODERM) 5 %; Apply patch to the affected area for 12 hours a day and remove for 12 hours a day. -     gabapentin (NEURONTIN) 100 mg capsule; Take 1 Cap by mouth three (3) times daily. 6. History of left breast cancer  -     JULIO MAMMO BI SCREENING INCL CAD; Future    7.  Tobacco abuse  -     nicotine (NICODERM CQ) 14 mg/24 hr patch; 1 Patch by TransDERmal route every twenty-four (24) hours for 30 days.    8. Abnormal finding in urine  -     CULTURE, URINE    Follow-up and Dispositions    · Return in about 3 months (around 6/22/2019), or if symptoms worsen or fail to improve.       lab results and schedule of future lab studies reviewed with patient  reviewed diet, exercise and weight control  very strongly urged to quit smoking to reduce cardiovascular risk  cardiovascular risk and specific lipid/LDL goals reviewed    I have reviewed the patient's allergies and made any necessary changes. Medical, procedural, social and family histories have been reviewed and updated as medically indicated. I have reconciled and/or revised patient medications in the EMR. I have discussed each diagnosis listed in this note with Maritza Rodarte and/or their family. I have discussed treatment options and the risk/benefit analysis of those options, including safe use of medications and possible medication side effects. Through the use of shared decision making we have agreed to the above plan. The patient has received an after-visit summary and questions were answered concerning future plans. Rosalie Mcgregor, MELLISA-C    This note will not be viewable in [employer].

## 2019-03-23 LAB
25(OH)D3+25(OH)D2 SERPL-MCNC: 41.8 NG/ML (ref 30–100)
ALBUMIN SERPL-MCNC: 4.1 G/DL (ref 3.6–4.8)
ALBUMIN/CREAT UR: 9.4 MG/G CREAT (ref 0–30)
ALBUMIN/GLOB SERPL: 1.4 {RATIO} (ref 1.2–2.2)
ALP SERPL-CCNC: 106 IU/L (ref 39–117)
ALT SERPL-CCNC: 9 IU/L (ref 0–32)
AST SERPL-CCNC: 9 IU/L (ref 0–40)
BACTERIA UR CULT: NO GROWTH
BASOPHILS # BLD AUTO: 0 X10E3/UL (ref 0–0.2)
BASOPHILS NFR BLD AUTO: 0 %
BILIRUB SERPL-MCNC: 0.4 MG/DL (ref 0–1.2)
BUN SERPL-MCNC: 6 MG/DL (ref 8–27)
BUN/CREAT SERPL: 7 (ref 12–28)
CALCIUM SERPL-MCNC: 9.5 MG/DL (ref 8.7–10.3)
CHLORIDE SERPL-SCNC: 102 MMOL/L (ref 96–106)
CHOLEST SERPL-MCNC: 255 MG/DL (ref 100–199)
CO2 SERPL-SCNC: 24 MMOL/L (ref 20–29)
CREAT SERPL-MCNC: 0.91 MG/DL (ref 0.57–1)
CREAT UR-MCNC: 43.6 MG/DL
EOSINOPHIL # BLD AUTO: 0.1 X10E3/UL (ref 0–0.4)
EOSINOPHIL NFR BLD AUTO: 1 %
ERYTHROCYTE [DISTWIDTH] IN BLOOD BY AUTOMATED COUNT: 14.7 % (ref 12.3–15.4)
GLOBULIN SER CALC-MCNC: 2.9 G/DL (ref 1.5–4.5)
GLUCOSE SERPL-MCNC: 206 MG/DL (ref 65–99)
HCT VFR BLD AUTO: 37 % (ref 34–46.6)
HDLC SERPL-MCNC: 38 MG/DL
HGB BLD-MCNC: 11.9 G/DL (ref 11.1–15.9)
IMM GRANULOCYTES # BLD AUTO: 0 X10E3/UL (ref 0–0.1)
IMM GRANULOCYTES NFR BLD AUTO: 0 %
INTERPRETATION, 910389: NORMAL
LDLC SERPL CALC-MCNC: 177 MG/DL (ref 0–99)
LYMPHOCYTES # BLD AUTO: 1.4 X10E3/UL (ref 0.7–3.1)
LYMPHOCYTES NFR BLD AUTO: 12 %
Lab: NORMAL
Lab: NORMAL
MCH RBC QN AUTO: 28.5 PG (ref 26.6–33)
MCHC RBC AUTO-ENTMCNC: 32.2 G/DL (ref 31.5–35.7)
MCV RBC AUTO: 89 FL (ref 79–97)
MICROALBUMIN UR-MCNC: 4.1 UG/ML
MONOCYTES # BLD AUTO: 0.6 X10E3/UL (ref 0.1–0.9)
MONOCYTES NFR BLD AUTO: 6 %
NEUTROPHILS # BLD AUTO: 9.1 X10E3/UL (ref 1.4–7)
NEUTROPHILS NFR BLD AUTO: 81 %
PLATELET # BLD AUTO: 333 X10E3/UL (ref 150–379)
POTASSIUM SERPL-SCNC: 3.9 MMOL/L (ref 3.5–5.2)
PROT SERPL-MCNC: 7 G/DL (ref 6–8.5)
RBC # BLD AUTO: 4.17 X10E6/UL (ref 3.77–5.28)
SODIUM SERPL-SCNC: 142 MMOL/L (ref 134–144)
TRIGL SERPL-MCNC: 202 MG/DL (ref 0–149)
TSH SERPL DL<=0.005 MIU/L-ACNC: 2.13 UIU/ML (ref 0.45–4.5)
VLDLC SERPL CALC-MCNC: 40 MG/DL (ref 5–40)
WBC # BLD AUTO: 11.3 X10E3/UL (ref 3.4–10.8)

## 2019-03-29 NOTE — PROGRESS NOTES
RECOMMENDATIONS: 
Resume diabetic and cholesterol medications as discussed in office.  
 
Thyroid labs normal.  Vitamin D normal.  Liver and kidney function normal.

## 2019-05-01 DIAGNOSIS — J20.9 ACUTE BRONCHITIS, UNSPECIFIED ORGANISM: ICD-10-CM

## 2019-05-01 DIAGNOSIS — I10 ESSENTIAL HYPERTENSION: ICD-10-CM

## 2019-05-01 DIAGNOSIS — J45.20 MILD INTERMITTENT ASTHMA WITHOUT COMPLICATION: ICD-10-CM

## 2019-05-01 DIAGNOSIS — J44.9 COPD (CHRONIC OBSTRUCTIVE PULMONARY DISEASE) WITH CHRONIC BRONCHITIS (HCC): ICD-10-CM

## 2019-05-01 NOTE — TELEPHONE ENCOUNTER
----- Message from Fariba Lindsey sent at 5/1/2019 12:31 PM EDT -----  Regarding: Np Balbir/ Refill  Contact: [phone]  Pt requesting a refill on Rx Janumet 50/500 mg and Rx Lorazepam 1 mg. 1 [facility] at [phone]. Pt would like a call back to discuss medication.

## 2019-05-03 NOTE — TELEPHONE ENCOUNTER
Pt states Janumet prescribed Dr. Celso Rain originally. Informed would need to send both prescription requests to [facility] for review and informed she will be out of the office until May 7th. Pt verbalized understanding.

## 2019-05-06 RX ORDER — LORAZEPAM 0.5 MG/1
TABLET ORAL
Qty: 30 TAB | Refills: 5 | OUTPATIENT
Start: 2019-05-06 | End: 2020-01-10

## 2019-05-06 NOTE — TELEPHONE ENCOUNTER
Refilled meds. The ativan that was previously prescribed was only 0.5mg, not 1mg. Please phone in order below    Orders Placed This Encounter    SITagliptin-metFORMIN (JANUMET)  mg per tablet     Sig: Take 1 Tab by mouth two (2) times daily (with meals).      Dispense:  180 Tab     Refill:  3    LORazepam (ATIVAN) 0.5 mg tablet     Sig: One tablet nightly as needed for sleep     Dispense:  30 Tab     Refill:  5

## 2019-05-07 NOTE — TELEPHONE ENCOUNTER
Phoned in prescriptions to [facility]. Informed pt. Pt verbalized understanding.

## 2019-06-21 DIAGNOSIS — I10 ESSENTIAL HYPERTENSION: ICD-10-CM

## 2019-06-21 DIAGNOSIS — J44.9 COPD (CHRONIC OBSTRUCTIVE PULMONARY DISEASE) WITH CHRONIC BRONCHITIS (HCC): ICD-10-CM

## 2019-06-21 DIAGNOSIS — G89.29 CHRONIC BILATERAL LOW BACK PAIN WITHOUT SCIATICA: ICD-10-CM

## 2019-06-21 DIAGNOSIS — J20.9 ACUTE BRONCHITIS, UNSPECIFIED ORGANISM: ICD-10-CM

## 2019-06-21 DIAGNOSIS — M54.50 CHRONIC BILATERAL LOW BACK PAIN WITHOUT SCIATICA: ICD-10-CM

## 2019-06-21 DIAGNOSIS — J45.20 MILD INTERMITTENT ASTHMA WITHOUT COMPLICATION: ICD-10-CM

## 2019-06-21 DIAGNOSIS — E11.42 TYPE 2 DIABETES MELLITUS WITH DIABETIC POLYNEUROPATHY, WITHOUT LONG-TERM CURRENT USE OF INSULIN (HCC): ICD-10-CM

## 2019-06-21 RX ORDER — METOPROLOL SUCCINATE 25 MG/1
TABLET, EXTENDED RELEASE ORAL
Qty: 45 TAB | Refills: 3 | Status: SHIPPED | OUTPATIENT
Start: 2019-06-21 | End: 2020-07-15

## 2019-06-21 RX ORDER — BUDESONIDE AND FORMOTEROL FUMARATE DIHYDRATE 160; 4.5 UG/1; UG/1
2 AEROSOL RESPIRATORY (INHALATION) 2 TIMES DAILY
Qty: 1 INHALER | Refills: 11 | Status: SHIPPED | OUTPATIENT
Start: 2019-06-21 | End: 2020-06-04

## 2019-06-21 RX ORDER — GABAPENTIN 100 MG/1
100 CAPSULE ORAL 3 TIMES DAILY
Qty: 90 CAP | Refills: 1 | Status: SHIPPED | OUTPATIENT
Start: 2019-06-21 | End: 2019-08-08 | Stop reason: SDUPTHER

## 2019-06-21 RX ORDER — ROSUVASTATIN CALCIUM 10 MG/1
10 TABLET, COATED ORAL
Qty: 90 TAB | Refills: 3 | Status: SHIPPED | OUTPATIENT
Start: 2019-06-21 | End: 2019-08-08 | Stop reason: SDUPTHER

## 2019-06-21 NOTE — TELEPHONE ENCOUNTER
Verified patient with two type of identifiers. Informed pt at this time we do not have any more follow up appointment's available but scheduled for 7/23 at 11:30. Informed would place on cancellation list and call with any openings. Pt verbalized understanding. Pt requested refills on the attached order.

## 2019-06-21 NOTE — TELEPHONE ENCOUNTER
----- Message from Corine Perea sent at 6/21/2019 11:03 AM EDT -----  Regarding: Dr. Robert Villagomez stated she could not make her appointment for today due to trouble with her back. She would like a sooner appt and is available the second week of July.  Pt's best contact is  [phone]

## 2019-08-08 ENCOUNTER — OFFICE VISIT (OUTPATIENT)
Dept: FAMILY MEDICINE CLINIC | Age: 64
End: 2019-08-08

## 2019-08-08 VITALS
OXYGEN SATURATION: 97 % | HEIGHT: 63 IN | SYSTOLIC BLOOD PRESSURE: 121 MMHG | TEMPERATURE: 96.2 F | WEIGHT: 158.6 LBS | RESPIRATION RATE: 18 BRPM | HEART RATE: 77 BPM | BODY MASS INDEX: 28.1 KG/M2 | DIASTOLIC BLOOD PRESSURE: 70 MMHG

## 2019-08-08 DIAGNOSIS — G89.29 CHRONIC BILATERAL LOW BACK PAIN WITHOUT SCIATICA: ICD-10-CM

## 2019-08-08 DIAGNOSIS — K21.9 GASTROESOPHAGEAL REFLUX DISEASE, ESOPHAGITIS PRESENCE NOT SPECIFIED: ICD-10-CM

## 2019-08-08 DIAGNOSIS — R68.81 EARLY SATIETY: ICD-10-CM

## 2019-08-08 DIAGNOSIS — J44.9 COPD (CHRONIC OBSTRUCTIVE PULMONARY DISEASE) WITH CHRONIC BRONCHITIS (HCC): ICD-10-CM

## 2019-08-08 DIAGNOSIS — Z12.11 COLON CANCER SCREENING: ICD-10-CM

## 2019-08-08 DIAGNOSIS — E11.42 TYPE 2 DIABETES MELLITUS WITH DIABETIC POLYNEUROPATHY, WITHOUT LONG-TERM CURRENT USE OF INSULIN (HCC): Primary | ICD-10-CM

## 2019-08-08 DIAGNOSIS — Z72.0 TOBACCO ABUSE: ICD-10-CM

## 2019-08-08 DIAGNOSIS — E78.2 MIXED HYPERLIPIDEMIA: ICD-10-CM

## 2019-08-08 DIAGNOSIS — M54.50 CHRONIC BILATERAL LOW BACK PAIN WITHOUT SCIATICA: ICD-10-CM

## 2019-08-08 DIAGNOSIS — I10 ESSENTIAL HYPERTENSION: ICD-10-CM

## 2019-08-08 LAB — HBA1C MFR BLD HPLC: 6.9 %

## 2019-08-08 RX ORDER — GABAPENTIN 300 MG/1
300 CAPSULE ORAL 3 TIMES DAILY
Qty: 90 CAP | Refills: 5 | Status: SHIPPED | OUTPATIENT
Start: 2019-08-08 | End: 2019-09-10 | Stop reason: SDUPTHER

## 2019-08-08 RX ORDER — VARENICLINE TARTRATE 25 MG
KIT ORAL
Qty: 1 DOSE PACK | Refills: 0 | Status: SHIPPED | OUTPATIENT
Start: 2019-08-08 | End: 2019-09-10 | Stop reason: ALTCHOICE

## 2019-08-08 RX ORDER — OMEPRAZOLE 20 MG/1
20 CAPSULE, DELAYED RELEASE ORAL DAILY
Qty: 30 CAP | Refills: 2 | Status: SHIPPED | OUTPATIENT
Start: 2019-08-08 | End: 2019-10-08 | Stop reason: SDUPTHER

## 2019-08-08 RX ORDER — ROSUVASTATIN CALCIUM 5 MG/1
5 TABLET, COATED ORAL
Qty: 90 TAB | Refills: 3 | Status: SHIPPED | OUTPATIENT
Start: 2019-08-08 | End: 2020-08-03

## 2019-08-08 NOTE — PROGRESS NOTES
HISTORY OF PRESENT ILLNESS  Baldemar Pickett is a 61 y.o. female. HPI  Patient comes in today for follow up  Wants chantix for smoking cessation - has tried patches and gum without success. States she has quit in past when hospitalized. Had appointment with pulmonary but did not have a chest xray on file, so she canceled appointment. Patient given contact information to reschedule  Complains of GERD, would like prescribed medication. States prilosec normally helps. Has some early satiety. Patient refuses referral to GI at this time. States she is taking gabapentin for her feet, complains of burning in bottom of feet. States needs increase in gabapentin  Last A1c 11.4%. Patient was started on Janumet. Today's A1c is 6.9%. States cholesterol medication causes her to have leg cramps at night.   No Known Allergies    Past Medical History:   Diagnosis Date    Acute bronchitis 4/7/2016    Atopic dermatitis 4/7/2016    Breast cancer (HCC)     Chronic obstructive pulmonary disease (HCC)     COPD (chronic obstructive pulmonary disease) with chronic bronchitis ([facility] [address] 7/26/2018    Essential hypertension 3/31/2016    Eye problems 3/31/2016    EVER (generalized anxiety disorder) 10/11/2018    Panic anxiety syndrome 10/11/2018    Tremor of face and hands 10/11/2018       Past Surgical History:   Procedure Laterality Date    HX BREAST LUMPECTOMY         Social History     Socioeconomic History    Marital status:      Spouse name: Not on file    Number of children: Not on file    Years of education: Not on file    Highest education level: Not on file   Occupational History    Not on file   Social Needs    Financial resource strain: Not on file    Food insecurity:     Worry: Not on file     Inability: Not on file    Transportation needs:     Medical: Not on file     Non-medical: Not on file   Tobacco Use    Smoking status: Current Every Day Smoker     Packs/day: 1.50     Types: Cigarettes    Smokeless tobacco: Never Used   Substance and Sexual Activity    Alcohol use: No    Drug use: No    Sexual activity: Not Currently   Lifestyle    Physical activity:     Days per week: Not on file     Minutes per session: Not on file    Stress: Not on file   Relationships    Social connections:     Talks on phone: Not on file     Gets together: Not on file     Attends [employer] service: Not on file     Active member of club or organization: Not on file     Attends meetings of clubs or organizations: Not on file     Relationship status: Not on file    Intimate partner violence:     Fear of current or ex partner: Not on file     Emotionally abused: Not on file     Physically abused: Not on file     Forced sexual activity: Not on file   Other Topics Concern    Not on file   Social History Narrative    Not on file       Family History   Problem Relation Age of Onset    Diabetes Father     Arthritis-osteo Sister     Diabetes Sister    Can Destinee Bladder Disease Sister        Current Outpatient Medications   Medication Sig    gabapentin (NEURONTIN) 100 mg capsule Take 1 Cap by mouth three (3) times daily.  metoprolol succinate (TOPROL-XL) 25 mg XL tablet take one-half tablet by mouth daily    rosuvastatin (CRESTOR) 10 mg tablet Take 1 Tab by mouth nightly.  budesonide-formoterol (SYMBICORT) 160-4.5 mcg/actuation HFAA Take 2 Puffs by inhalation two (2) times a day.  SITagliptin-metFORMIN (JANUMET)  mg per tablet Take 1 Tab by mouth two (2) times daily (with meals).  LORazepam (ATIVAN) 0.5 mg tablet One tablet nightly as needed for sleep    cholecalciferol (VITAMIN D3) 1,000 unit tablet Take  by mouth daily.  losartan-hydroCHLOROthiazide (HYZAAR) 100-25 mg per tablet Take 1 Tab by mouth daily.  albuterol-ipratropium (DUO-NEB) 2.5 mg-0.5 mg/3 ml nebu 3 mL by Nebulization route every four (4) hours as needed (for wheezing and shortness of breath).     albuterol (PROVENTIL HFA, VENTOLIN HFA, PROAIR HFA) 90 mcg/actuation inhaler Take 2 Puffs by inhalation every four (4) hours as needed for Wheezing or Shortness of Breath.  lidocaine (LIDODERM) 5 % Apply patch to the affected area for 12 hours a day and remove for 12 hours a day.  Blood-Glucose Meter monitoring kit Monitor BG once daily and as needed  Dx: E11.42  Provide with glucometer that insurance covers.  lancets misc Monitor BG once daily and as needed  Dx: E11.42  Provide with glucometer/lancets that insurance covers.  glucose blood VI test strips (BLOOD GLUCOSE TEST) strip Monitor BG once daily and as needed  Dx: E11.42  Provide with glucometer/strips that insurance covers. No current facility-administered medications for this visit. Review of Systems   Constitutional: Negative for chills and fever. HENT: Negative for ear pain and nosebleeds. Eyes: Negative for blurred vision. Respiratory: Positive for cough, sputum production, shortness of breath and wheezing. Cardiovascular: Negative for chest pain, palpitations and leg swelling. Gastrointestinal: Positive for heartburn. Negative for blood in stool, constipation, diarrhea, melena, nausea and vomiting. EARLY SATIETY   Genitourinary: Negative for dysuria, frequency and urgency. Musculoskeletal: Positive for back pain, joint pain and myalgias. Skin: Negative for itching and rash. Neurological: Positive for sensory change (burning pain in feet). Negative for focal weakness and headaches. Endo/Heme/Allergies: Negative for polydipsia. Psychiatric/Behavioral: Negative for depression. The patient is not nervous/anxious. Physical Exam   Constitutional: She is oriented to person, place, and time. Vital signs are normal. She appears well-developed and well-nourished. She is cooperative.    HENT:   Right Ear: Hearing, tympanic membrane, external ear and ear canal normal.   Left Ear: Hearing, tympanic membrane, external ear and ear canal normal.   Nose: Nose normal.   Mouth/Throat: Uvula is midline, oropharynx is clear and moist and mucous membranes are normal.   Neck: No thyromegaly present. Cardiovascular: Normal rate, regular rhythm, S1 normal, S2 normal and normal heart sounds. No murmur heard. Pulses:       Dorsalis pedis pulses are 2+ on the right side, and 2+ on the left side. No edema noted   Pulmonary/Chest: Effort normal and breath sounds normal.   Congested cough   Abdominal: Soft. Normal appearance and bowel sounds are normal. There is no hepatosplenomegaly. There is no tenderness. Musculoskeletal: Normal range of motion. Lymphadenopathy:     She has no cervical adenopathy. Right: No supraclavicular adenopathy present. Left: No supraclavicular adenopathy present. Neurological: She is alert and oriented to person, place, and time. Skin: Skin is warm, dry and intact. Diabetic foot exam:   Left: Intact sensation to monofilament 4/4 locations   Position sense intact   2+ DP pulse   No foot deformities  Right: Intact sensation to monofilament 4/4 locations   Position sense intact   2+ DP pulse   No foot deformities   Psychiatric: She has a normal mood and affect. Her speech is normal and behavior is normal. Thought content normal.   Vitals reviewed. ASSESSMENT and PLAN    ICD-10-CM ICD-9-CM    1. Type 2 diabetes mellitus with diabetic polyneuropathy, without long-term current use of insulin ([facility]) A23.15 582.91 METABOLIC PANEL, BASIC     357.2 AMB POC HEMOGLOBIN A1C      LIPID PANEL      gabapentin (NEURONTIN) 300 mg capsule       DIABETES FOOT EXAM   2. Essential hypertension I10 401.9 rosuvastatin (CRESTOR) 5 mg tablet   3. Mixed hyperlipidemia E78.2 272.2 LIPID PANEL   4. Gastroesophageal reflux disease, esophagitis presence not specified K21.9 530.81 omeprazole (PRILOSEC) 20 mg capsule   5. Tobacco abuse Z72.0 305.1 varenicline (CHANTIX STARTER FILEMON) 0.5 mg (11)- 1 mg (42) DsPk   6.  COPD (chronic obstructive pulmonary disease) with chronic bronchitis ([facility] J44.9 491.20 XR CHEST PA LAT   7. Early satiety R68.81 780.94    8. Chronic bilateral low back pain without sciatica M54.5 724.2 gabapentin (NEURONTIN) 300 mg capsule    G89.29 338.29    9. Colon cancer screening Z12.11 V76.51 COLOGUARD TEST (FECAL DNA COLORECTAL CANCER SCREENING)     Encounter Diagnoses   Name Primary?  Type 2 diabetes mellitus with diabetic polyneuropathy, without long-term current use of insulin (HCC) Yes    Essential hypertension     Mixed hyperlipidemia     Gastroesophageal reflux disease, esophagitis presence not specified     Tobacco abuse     COPD (chronic obstructive pulmonary disease) with chronic bronchitis (HCC)     Early satiety     Chronic bilateral low back pain without sciatica     Colon cancer screening      Orders Placed This Encounter    XR CHEST PA LAT    METABOLIC PANEL, BASIC    LIPID PANEL    COLOGUARD TEST (FECAL DNA COLORECTAL CANCER SCREENING)    AMB POC HEMOGLOBIN A1C    varenicline (CHANTIX STARTER FILEMON) 0.5 mg (11)- 1 mg (42) DsPk    rosuvastatin (CRESTOR) 5 mg tablet    omeprazole (PRILOSEC) 20 mg capsule    gabapentin (NEURONTIN) 300 mg capsule     Diagnoses and all orders for this visit:    1. Type 2 diabetes mellitus with diabetic polyneuropathy, without long-term current use of insulin (HCC) - A1c 6.9%!!! Excellent control of diabetes, better improvement from 11.4%. Continue current meds  -     METABOLIC PANEL, BASIC  -     AMB POC HEMOGLOBIN A1C  -     LIPID PANEL  -     gabapentin (NEURONTIN) 300 mg capsule; Take 1 Cap by mouth three (3) times daily. Max Daily Amount: 900 mg.  -      DIABETES FOOT EXAM    2. Essential hypertension  -     rosuvastatin (CRESTOR) 5 mg tablet; Take 1 Tab by mouth nightly. 3. Mixed hyperlipidemia  -     LIPID PANEL    4. Gastroesophageal reflux disease, esophagitis presence not specified  -     omeprazole (PRILOSEC) 20 mg capsule; Take 1 Cap by mouth daily.     5. Tobacco abuse - has tried nicotine patches and gum  -     varenicline (CHANTIX STARTER FILEMON) 0.5 mg (11)- 1 mg (42) DsPk; Per dose pack instructions    6. COPD (chronic obstructive pulmonary disease) with chronic bronchitis ([facility] [address] - CXR ordered, patient to reschedule appt with pulmonary  -     XR CHEST PA LAT; Future    7. Early satiety - patient refuses referral to GI at this time. Discussed with patient delay of diagnosis and treatment with poor outcomes, patient verbalized understanding. 8. Chronic bilateral low back pain without sciatica  -     gabapentin (NEURONTIN) 300 mg capsule; Take 1 Cap by mouth three (3) times daily. Max Daily Amount: 900 mg.    9. Colon cancer screening  -     COLOGUARD TEST (FECAL DNA COLORECTAL CANCER SCREENING)      Follow-up and Dispositions    · Return in about 4 months (around 12/8/2019). lab results and schedule of future lab studies reviewed with patient  reviewed diet, exercise and weight control    I have reviewed the patient's allergies and made any necessary changes. Medical, procedural, social and family histories have been reviewed and updated as medically indicated. I have reconciled and/or revised patient medications in the EMR. I have discussed each diagnosis listed in this note with Flor Hamilton and/or their family. I have discussed treatment options and the risk/benefit analysis of those options, including safe use of medications and possible medication side effects. Through the use of shared decision making we have agreed to the above plan. The patient has received an after-visit summary and questions were answered concerning future plans. Rosalie Mcgregor, CONSUELO    This note will not be viewable in [employer].

## 2019-08-08 NOTE — PROGRESS NOTES
Chief Complaint   Patient presents with    Diabetes    Hypertension    COPD     Pt would like to discuss GERD medication. Pt has tried Prilosec but would like to try prescription. Pt would like to try chantix. Pt has tried patches without relief. Pt would like to discuss possible increase of gabapentin. 1. Have you been to the ER, urgent care clinic since your last visit? Hospitalized since your last visit? No    2. Have you seen or consulted any other health care providers outside of the [address] [place] [address] since your last visit? Include any pap smears or colon screening.  No    Health Maintenance Due   Topic Date Due    Hepatitis C Screening  1955    FOOT EXAM Q1  12/22/1965    EYE EXAM RETINAL OR DILATED  12/22/1965    DTaP/Tdap/Td series (1 - Tdap) 12/22/1976    PAP AKA CERVICAL CYTOLOGY  12/22/1976    Shingrix Vaccine Age 50> (1 of 2) 12/22/2005    FOBT Q 1 YEAR AGE 50-75  12/22/2005    BREAST CANCER SCRN MAMMOGRAM  01/17/2014

## 2019-08-09 LAB
BUN SERPL-MCNC: 7 MG/DL (ref 8–27)
BUN/CREAT SERPL: 8 (ref 12–28)
CALCIUM SERPL-MCNC: 10.7 MG/DL (ref 8.7–10.3)
CHLORIDE SERPL-SCNC: 96 MMOL/L (ref 96–106)
CHOLEST SERPL-MCNC: 146 MG/DL (ref 100–199)
CO2 SERPL-SCNC: 22 MMOL/L (ref 20–29)
CREAT SERPL-MCNC: 0.86 MG/DL (ref 0.57–1)
GLUCOSE SERPL-MCNC: 129 MG/DL (ref 65–99)
HDLC SERPL-MCNC: 51 MG/DL
INTERPRETATION, 910389: NORMAL
LDLC SERPL CALC-MCNC: 71 MG/DL (ref 0–99)
Lab: NORMAL
POTASSIUM SERPL-SCNC: 4.5 MMOL/L (ref 3.5–5.2)
SODIUM SERPL-SCNC: 140 MMOL/L (ref 134–144)
TRIGL SERPL-MCNC: 118 MG/DL (ref 0–149)
VLDLC SERPL CALC-MCNC: 24 MG/DL (ref 5–40)

## 2019-08-13 NOTE — PROGRESS NOTES
RECOMMENDATIONS:  Diabetes under much better control!! Keep up the good work! Cholesterol numbers look great!!    Calcium level is a little high. This could be due to the HCTZ in your blood pressure medication. Schedule a lab only visit in 2 weeks to repeat. If remains elevated, we may need to take the HCTZ component out of your blood pressure medicine.

## 2019-08-28 ENCOUNTER — LAB ONLY (OUTPATIENT)
Dept: FAMILY MEDICINE CLINIC | Age: 64
End: 2019-08-28

## 2019-08-28 DIAGNOSIS — E83.52 SERUM CALCIUM ELEVATED: Primary | ICD-10-CM

## 2019-08-28 NOTE — PROGRESS NOTES
Patient comes in today for lab only appt      ICD-10-CM ICD-9-CM    1.  Serum calcium elevated N53.15 432.49 METABOLIC PANEL, BASIC      [place] [facility]&/OR CONVEY OF SPEC FOR TR OFFICE TO LAB     Orders Placed This Encounter    METABOLIC PANEL, BASIC    [place] [facility]&/OR CONVEY OF SPEC FOR TR OFFICE TO LAB

## 2019-09-10 DIAGNOSIS — G89.29 CHRONIC BILATERAL LOW BACK PAIN WITHOUT SCIATICA: ICD-10-CM

## 2019-09-10 DIAGNOSIS — E11.42 TYPE 2 DIABETES MELLITUS WITH DIABETIC POLYNEUROPATHY, WITHOUT LONG-TERM CURRENT USE OF INSULIN (HCC): ICD-10-CM

## 2019-09-10 DIAGNOSIS — M54.50 CHRONIC BILATERAL LOW BACK PAIN WITHOUT SCIATICA: ICD-10-CM

## 2019-09-10 RX ORDER — GABAPENTIN 100 MG/1
CAPSULE ORAL
Qty: 90 CAP | Refills: 0 | OUTPATIENT
Start: 2019-09-10

## 2019-09-10 RX ORDER — VARENICLINE TARTRATE 1 MG/1
1 TABLET, FILM COATED ORAL 2 TIMES DAILY
Qty: 60 TAB | Refills: 4 | Status: SHIPPED | OUTPATIENT
Start: 2019-09-10 | End: 2021-07-15 | Stop reason: ALTCHOICE

## 2019-09-10 RX ORDER — GABAPENTIN 300 MG/1
300 CAPSULE ORAL 3 TIMES DAILY
Qty: 90 CAP | Refills: 5 | OUTPATIENT
Start: 2019-09-10 | End: 2020-03-19

## 2019-09-10 NOTE — TELEPHONE ENCOUNTER
Verified patient with two type of identifiers. Pt states lost physical script for Gabapentin 300 mg. Pt also in need of chanitx. Pt finished starter pack last night.

## 2019-09-11 LAB
BUN SERPL-MCNC: 9 MG/DL (ref 8–27)
BUN/CREAT SERPL: 8 (ref 12–28)
CALCIUM SERPL-MCNC: 10.3 MG/DL (ref 8.7–10.3)
CHLORIDE SERPL-SCNC: 93 MMOL/L (ref 96–106)
CO2 SERPL-SCNC: 25 MMOL/L (ref 20–29)
CREAT SERPL-MCNC: 1.12 MG/DL (ref 0.57–1)
GLUCOSE SERPL-MCNC: 124 MG/DL (ref 65–99)
INTERPRETATION: NORMAL
POTASSIUM SERPL-SCNC: 3.8 MMOL/L (ref 3.5–5.2)
SODIUM SERPL-SCNC: 138 MMOL/L (ref 134–144)

## 2019-09-11 NOTE — PROGRESS NOTES
RECOMMENDATIONS:  Calcium level normal.   Make sure you are drinking plenty of water. Avoid excessive amounts of NSAIDs (Advil, Aleve, ibuprofen, Naprosyn).

## 2019-10-04 DIAGNOSIS — I10 ESSENTIAL HYPERTENSION: ICD-10-CM

## 2019-10-04 RX ORDER — LOSARTAN POTASSIUM AND HYDROCHLOROTHIAZIDE 25; 100 MG/1; MG/1
TABLET ORAL
Qty: 90 TAB | Refills: 3 | Status: SHIPPED | OUTPATIENT
Start: 2019-10-04 | End: 2020-10-02

## 2019-10-08 DIAGNOSIS — K21.9 GASTROESOPHAGEAL REFLUX DISEASE, ESOPHAGITIS PRESENCE NOT SPECIFIED: ICD-10-CM

## 2019-10-08 RX ORDER — OMEPRAZOLE 20 MG/1
CAPSULE, DELAYED RELEASE ORAL
Qty: 30 CAP | Refills: 3 | Status: SHIPPED | OUTPATIENT
Start: 2019-10-08 | End: 2020-03-06

## 2019-10-25 ENCOUNTER — TELEPHONE (OUTPATIENT)
Dept: FAMILY MEDICINE CLINIC | Age: 64
End: 2019-10-25

## 2019-10-25 DIAGNOSIS — I10 ESSENTIAL HYPERTENSION: ICD-10-CM

## 2019-10-25 RX ORDER — IPRATROPIUM BROMIDE AND ALBUTEROL SULFATE 2.5; .5 MG/3ML; MG/3ML
SOLUTION RESPIRATORY (INHALATION)
Qty: 125 NEBULE | Refills: 10 | Status: SHIPPED | OUTPATIENT
Start: 2019-10-25 | End: 2020-12-15

## 2019-10-25 NOTE — TELEPHONE ENCOUNTER
Patient is requesting a RX refill      albuterol-ipratropium (DUO-NEB) 2.5 mg-0.5 mg/3 ml nebu she can be reached @ [id number]

## 2019-10-25 NOTE — TELEPHONE ENCOUNTER
Verified patient with two type of identifiers. Informed pt that prescription was signed of at 2:47. Pt verbalized understanding.

## 2019-11-08 DIAGNOSIS — M54.50 CHRONIC BILATERAL LOW BACK PAIN WITHOUT SCIATICA: ICD-10-CM

## 2019-11-08 DIAGNOSIS — G89.29 CHRONIC BILATERAL LOW BACK PAIN WITHOUT SCIATICA: ICD-10-CM

## 2019-11-08 RX ORDER — LIDOCAINE 50 MG/G
PATCH TOPICAL
Qty: 30 PATCH | Refills: 4 | Status: SHIPPED | OUTPATIENT
Start: 2019-11-08 | End: 2020-04-07

## 2019-12-05 ENCOUNTER — TELEPHONE (OUTPATIENT)
Dept: FAMILY MEDICINE CLINIC | Age: 64
End: 2019-12-05

## 2019-12-05 NOTE — TELEPHONE ENCOUNTER
----- Message from Anita Lozada sent at 12/5/2019 10:54 AM EST -----  Regarding: NP Karon/telephone  Appointment not available    Caller's first and last name and relationship to patient (if not the patient):      Best contact number:  [phone]    Preferred date and time:  As soon as possible     Scheduled appointment date and time:  12/5/2019 @11:30 am    Reason for appointment:  F/up    Details to clarify the request: pt would like to see her some time this month since the appointment is marked as needing to be r/s.       Anita Lozada

## 2019-12-05 NOTE — TELEPHONE ENCOUNTER
Verified patient with two type of identifiers.  Informed pt that it was a system mishap and pt rescheduled for 12/13/19 at 12 PM.

## 2019-12-13 ENCOUNTER — OFFICE VISIT (OUTPATIENT)
Dept: FAMILY MEDICINE CLINIC | Age: 64
End: 2019-12-13

## 2019-12-13 VITALS
HEART RATE: 74 BPM | RESPIRATION RATE: 18 BRPM | WEIGHT: 158 LBS | DIASTOLIC BLOOD PRESSURE: 75 MMHG | TEMPERATURE: 97.2 F | SYSTOLIC BLOOD PRESSURE: 138 MMHG | BODY MASS INDEX: 28 KG/M2 | OXYGEN SATURATION: 96 % | HEIGHT: 63 IN

## 2019-12-13 DIAGNOSIS — J44.1 COPD EXACERBATION (HCC): Primary | ICD-10-CM

## 2019-12-13 DIAGNOSIS — F41.1 GAD (GENERALIZED ANXIETY DISORDER): ICD-10-CM

## 2019-12-13 DIAGNOSIS — J40 BRONCHITIS: ICD-10-CM

## 2019-12-13 DIAGNOSIS — Z11.59 NEED FOR HEPATITIS C SCREENING TEST: ICD-10-CM

## 2019-12-13 DIAGNOSIS — I10 ESSENTIAL HYPERTENSION: ICD-10-CM

## 2019-12-13 DIAGNOSIS — E11.42 TYPE 2 DIABETES MELLITUS WITH DIABETIC POLYNEUROPATHY, WITHOUT LONG-TERM CURRENT USE OF INSULIN (HCC): ICD-10-CM

## 2019-12-13 DIAGNOSIS — E78.2 MIXED HYPERLIPIDEMIA: ICD-10-CM

## 2019-12-13 LAB — HBA1C MFR BLD HPLC: 7.2 %

## 2019-12-13 RX ORDER — PREDNISONE 20 MG/1
20 TABLET ORAL DAILY
Qty: 5 TAB | Refills: 0 | Status: SHIPPED | OUTPATIENT
Start: 2019-12-13 | End: 2019-12-18

## 2019-12-13 RX ORDER — DOXYCYCLINE 100 MG/1
100 CAPSULE ORAL 2 TIMES DAILY
Qty: 20 CAP | Refills: 0 | Status: SHIPPED | OUTPATIENT
Start: 2019-12-13 | End: 2019-12-23

## 2019-12-13 NOTE — PROGRESS NOTES
Chief Complaint   Patient presents with    Diabetes    Hypertension     Pt concerned with leg pain at night and not able to sleep. Pt concerned with drainage x 1 week. 1. Have you been to the ER, urgent care clinic since your last visit? Hospitalized since your last visit? No    2. Have you seen or consulted any other health care providers outside of the [address] [place] [address] since your last visit? Include any pap smears or colon screening.  No     Eye Exam - Pt aware overdue  Flu shot - Declined    Health Maintenance Due   Topic Date Due    Hepatitis C Screening  1955    EYE EXAM RETINAL OR DILATED  12/22/1965    DTaP/Tdap/Td series (1 - Tdap) 12/22/1966    PAP AKA CERVICAL CYTOLOGY  12/22/1976    Shingrix Vaccine Age 50> (1 of 2) 12/22/2005    BREAST CANCER SCRN MAMMOGRAM  01/17/2014    Influenza Age 9 to Adult  08/01/2019

## 2019-12-13 NOTE — PROGRESS NOTES
HISTORY OF PRESENT ILLNESS  Ciera Crcokett is a 61 y.o. female. HPI  Patient comes in today for follow up  Pt concerned with leg pain at night and not able to sleep. Left arm pain with touch. Thought she slept on it wrong. Could not turn over in bed. Pt concerned with drainage x 1 week. OTC Mucinex not helping. Has gotten worse. Productive cough with clear mucus. normally gets bronchitis with weather changes. Some wheezing, using albuterol with some relief. Denies fever, chills, N/V/D. States she is taking gabapentin for her feet, complains of burning in bottom of feet. States needs increase in gabapentin - she is mostly only taking at night. Last A1c 6.9%. Patient was started on Janumet. initial A1c 11.4% in March 2019. Monitoring BG, states generally runs good (uinder 150). Denies any tingling sensation, polyuria and polydipsia. No blurred vision. No significant weight changes.     No Known Allergies    Past Medical History:   Diagnosis Date    Acute bronchitis 4/7/2016    Atopic dermatitis 4/7/2016    Breast cancer (HCC)     Chronic obstructive pulmonary disease (HCC)     COPD (chronic obstructive pulmonary disease) with chronic bronchitis ([facility] [address] 7/26/2018    Essential hypertension 3/31/2016    Eye problems 3/31/2016    EVER (generalized anxiety disorder) 10/11/2018    Panic anxiety syndrome 10/11/2018    Tremor of face and hands 10/11/2018       Past Surgical History:   Procedure Laterality Date    HX BREAST LUMPECTOMY         Social History     Socioeconomic History    Marital status:      Spouse name: Not on file    Number of children: Not on file    Years of education: Not on file    Highest education level: Not on file   Occupational History    Not on file   Social Needs    Financial resource strain: Not on file    Food insecurity:     Worry: Not on file     Inability: Not on file    Transportation needs:     Medical: Not on file     Non-medical: Not on file Tobacco Use    Smoking status: Current Every Day Smoker     Packs/day: 0.50     Types: Cigarettes    Smokeless tobacco: Never Used   Substance and Sexual Activity    Alcohol use: No    Drug use: No    Sexual activity: Not Currently   Lifestyle    Physical activity:     Days per week: Not on file     Minutes per session: Not on file    Stress: Not on file   Relationships    Social connections:     Talks on phone: Not on file     Gets together: Not on file     Attends Sikhism service: Not on file     Active member of club or organization: Not on file     Attends meetings of clubs or organizations: Not on file     Relationship status: Not on file    Intimate partner violence:     Fear of current or ex partner: Not on file     Emotionally abused: Not on file     Physically abused: Not on file     Forced sexual activity: Not on file   Other Topics Concern    Not on file   Social History Narrative    Not on file       Family History   Problem Relation Age of Onset    Diabetes Father     Arthritis-osteo Sister     Diabetes Sister     Gall Bladder Disease Sister        Current Outpatient Medications   Medication Sig    lidocaine (LIDODERM) 5 % APPLY 1 PATCH TO AFFECTED AREA FOR 12 HOURS IN A 24 HOUR PERIOD    albuterol-ipratropium (DUO-NEB) 2.5 mg-0.5 mg/3 ml nebu INHALE THREE MILLILITERS VIA NEBULIZATION BY MOUTH EVERY 4 HOURS AS NEEDED FOR WHEEZING AND SHORTNESS OF BREATH    omeprazole (PRILOSEC) 20 mg capsule TAKE ONE CAPSULE BY MOUTH DAILY    losartan-hydroCHLOROthiazide (HYZAAR) 100-25 mg per tablet TAKE ONE TABLET BY MOUTH DAILY    gabapentin (NEURONTIN) 300 mg capsule Take 1 Cap by mouth three (3) times daily. Max Daily Amount: 900 mg.  varenicline (CHANTIX) 1 mg tablet Take 1 Tab by mouth two (2) times a day.  rosuvastatin (CRESTOR) 5 mg tablet Take 1 Tab by mouth nightly. (Patient taking differently: Take 5 mg by mouth nightly.  4 Times weekly)    metoprolol succinate (TOPROL-XL) 25 mg XL tablet take one-half tablet by mouth daily    budesonide-formoterol (SYMBICORT) 160-4.5 mcg/actuation HFAA Take 2 Puffs by inhalation two (2) times a day.  SITagliptin-metFORMIN (JANUMET)  mg per tablet Take 1 Tab by mouth two (2) times daily (with meals).  LORazepam (ATIVAN) 0.5 mg tablet One tablet nightly as needed for sleep    cholecalciferol (VITAMIN D3) 1,000 unit tablet Take  by mouth daily.  albuterol (PROVENTIL HFA, VENTOLIN HFA, PROAIR HFA) 90 mcg/actuation inhaler Take 2 Puffs by inhalation every four (4) hours as needed for Wheezing or Shortness of Breath.  Blood-Glucose Meter monitoring kit Monitor BG once daily and as needed  Dx: E11.42  Provide with glucometer that insurance covers.  lancets misc Monitor BG once daily and as needed  Dx: E11.42  Provide with glucometer/lancets that insurance covers.  glucose blood VI test strips (BLOOD GLUCOSE TEST) strip Monitor BG once daily and as needed  Dx: E11.42  Provide with glucometer/strips that insurance covers. No current facility-administered medications for this visit. Review of Systems   Constitutional: Negative for chills and fever. HENT: Negative for ear pain and nosebleeds. Eyes: Negative for blurred vision. Respiratory: Positive for cough, sputum production, shortness of breath and wheezing. Cardiovascular: Negative for chest pain, palpitations and leg swelling. Gastrointestinal: Negative for heartburn, nausea and vomiting. Genitourinary: Negative for dysuria, frequency and urgency. Musculoskeletal: Positive for back pain, joint pain and myalgias. Skin: Negative for itching and rash. Neurological: Positive for sensory change (burning pain in feet). Negative for focal weakness and headaches. Endo/Heme/Allergies: Negative for polydipsia. Psychiatric/Behavioral: Negative for depression. The patient is not nervous/anxious. Physical Exam  Vitals signs reviewed.    Constitutional: Appearance: Normal appearance. She is well-developed. HENT:      Right Ear: Hearing, tympanic membrane, ear canal and external ear normal.      Left Ear: Hearing, tympanic membrane, ear canal and external ear normal.      Nose: Nose normal.      Mouth/Throat:      Pharynx: Uvula midline. Neck:      Thyroid: No thyromegaly. Cardiovascular:      Rate and Rhythm: Normal rate and regular rhythm. Pulses:           Dorsalis pedis pulses are 2+ on the right side and 2+ on the left side. Heart sounds: Normal heart sounds, S1 normal and S2 normal. No murmur. Comments: No edema noted  Pulmonary:      Effort: Pulmonary effort is normal.      Breath sounds: Normal breath sounds. Abdominal:      General: Bowel sounds are normal.      Palpations: Abdomen is soft. Tenderness: There is no tenderness. Musculoskeletal: Normal range of motion. Lymphadenopathy:      Cervical: No cervical adenopathy. Upper Body:      Right upper body: No supraclavicular adenopathy. Left upper body: No supraclavicular adenopathy. Skin:     General: Skin is warm and dry. Neurological:      Mental Status: She is alert and oriented to person, place, and time. Psychiatric:         Speech: Speech normal.         Behavior: Behavior normal. Behavior is cooperative. Thought Content: Thought content normal.     ASSESSMENT and PLAN    ICD-10-CM ICD-9-CM    1. COPD exacerbation (HCC) J44.1 491.21 doxycycline (VIBRAMYCIN) 100 mg capsule      predniSONE (DELTASONE) 20 mg tablet   2. Bronchitis J40 490 doxycycline (VIBRAMYCIN) 100 mg capsule      predniSONE (DELTASONE) 20 mg tablet   3. Type 2 diabetes mellitus with diabetic polyneuropathy, without long-term current use of insulin (HCC) E11.42 250.60 AMB POC HEMOGLOBIN A1C     274.9 METABOLIC PANEL, COMPREHENSIVE   4. Essential hypertension I10 401.9    5. Mixed hyperlipidemia E78.2 272.2    6. Need for hepatitis C screening test Z11.59 V73.89 HEPATITIS C AB   7. EVER (generalized anxiety disorder) F41.1 300.02      Encounter Diagnoses   Name Primary?  COPD exacerbation (HCC) Yes    Bronchitis     Type 2 diabetes mellitus with diabetic polyneuropathy, without long-term current use of insulin (HCC)     Essential hypertension     Mixed hyperlipidemia     Need for hepatitis C screening test     EVER (generalized anxiety disorder)      Orders Placed This Encounter    METABOLIC PANEL, COMPREHENSIVE    HEPATITIS C AB    AMB POC HEMOGLOBIN A1C    doxycycline (VIBRAMYCIN) 100 mg capsule    predniSONE (DELTASONE) 20 mg tablet     Diagnoses and all orders for this visit:    1. COPD exacerbation (HCC)  -     doxycycline (VIBRAMYCIN) 100 mg capsule; Take 1 Cap by mouth two (2) times a day for 10 days. -     predniSONE (DELTASONE) 20 mg tablet; Take 20 mg by mouth daily for 5 days. 2. Bronchitis  -     doxycycline (VIBRAMYCIN) 100 mg capsule; Take 1 Cap by mouth two (2) times a day for 10 days. -     predniSONE (DELTASONE) 20 mg tablet; Take 20 mg by mouth daily for 5 days. 3. Type 2 diabetes mellitus with diabetic polyneuropathy, without long-term current use of insulin (HCC)  -     AMB POC HEMOGLOBIN N3B  -     METABOLIC PANEL, COMPREHENSIVE    4. Essential hypertension    5. Mixed hyperlipidemia    6. Need for hepatitis C screening test  -     HEPATITIS C AB    7. EVER (generalized anxiety disorder)      Follow-up and Dispositions    · Return in about 4 months (around 4/13/2020). I have reviewed the patient's allergies and made any necessary changes. Medical, procedural, social and family histories have been reviewed and updated as medically indicated. I have reconciled and/or revised patient medications in the EMR. I have discussed each diagnosis listed in this note with [place] and/or their family.  I have discussed treatment options and the risk/benefit analysis of those options, including safe use of medications and possible medication side effects. Through the use of shared decision making we have agreed to the above plan. The patient has received an after-visit summary and questions were answered concerning future plans. Rosalie Mcgregor, CONSUELO    This note will not be viewable in [facility] [facility] [employer].

## 2019-12-13 NOTE — PATIENT INSTRUCTIONS
Bronchitis: Care Instructions Your Care Instructions Bronchitis is inflammation of the bronchial tubes, which carry air to the lungs. The tubes swell and produce mucus, or phlegm. The mucus and inflamed bronchial tubes make you cough. You may have trouble breathing. Most cases of bronchitis are caused by viruses like those that cause colds. Antibiotics usually do not help and they may be harmful. Bronchitis usually develops rapidly and lasts about 2 to 3 weeks in otherwise healthy people. Follow-up care is a key part of your treatment and safety. Be sure to make and go to all appointments, and call your doctor if you are having problems. It's also a good idea to know your test results and keep a list of the medicines you take. How can you care for yourself at home? · Take all medicines exactly as prescribed. Call your doctor if you think you are having a problem with your medicine. · Get some extra rest. 
· Take an over-the-counter pain medicine, such as acetaminophen (Tylenol), ibuprofen (Advil, Motrin), or naproxen (Aleve) to reduce fever and relieve body aches. Read and follow all instructions on the label. · Do not take two or more pain medicines at the same time unless the doctor told you to. Many pain medicines have acetaminophen, which is Tylenol. Too much acetaminophen (Tylenol) can be harmful. · Take an over-the-counter cough medicine that contains dextromethorphan to help quiet a dry, hacking cough so that you can sleep. Avoid cough medicines that have more than one active ingredient. Read and follow all instructions on the label. · Breathe moist air from a humidifier, hot shower, or sink filled with hot water. The heat and moisture will thin mucus so you can cough it out. · Do not smoke. Smoking can make bronchitis worse. If you need help quitting, talk to your doctor about stop-smoking programs and medicines. These can increase your chances of quitting for good. When should you call for help? Call 911 anytime you think you may need emergency care. For example, call if: 
  · You have severe trouble breathing.  
 Call your doctor now or seek immediate medical care if: 
  · You have new or worse trouble breathing.  
  · You cough up dark brown or bloody mucus (sputum).  
  · You have a new or higher fever.  
  · You have a new rash.  
 Watch closely for changes in your health, and be sure to contact your doctor if: 
  · You cough more deeply or more often, especially if you notice more mucus or a change in the color of your mucus.  
  · You are not getting better as expected. Where can you learn more? Go to http://angela-angelica.info/. Enter H333 in the search box to learn more about \"Bronchitis: Care Instructions. \" Current as of: June 9, 2019 Content Version: 12.2 © 6269-8066 [employer]. Care instructions adapted under license by [employer] (which disclaims liability or warranty for this information). If you have questions about a medical condition or this instruction, always ask your healthcare professional. [address] any warranty or liability for your use of this information.

## 2019-12-14 LAB
ALBUMIN SERPL-MCNC: 4.1 G/DL (ref 3.6–4.8)
ALBUMIN/GLOB SERPL: 1.7 {RATIO} (ref 1.2–2.2)
ALP SERPL-CCNC: 102 IU/L (ref 39–117)
ALT SERPL-CCNC: 8 IU/L (ref 0–32)
AST SERPL-CCNC: 11 IU/L (ref 0–40)
BILIRUB SERPL-MCNC: 0.3 MG/DL (ref 0–1.2)
BUN SERPL-MCNC: 14 MG/DL (ref 8–27)
BUN/CREAT SERPL: 15 (ref 12–28)
CALCIUM SERPL-MCNC: 9.6 MG/DL (ref 8.7–10.3)
CHLORIDE SERPL-SCNC: 95 MMOL/L (ref 96–106)
CO2 SERPL-SCNC: 25 MMOL/L (ref 20–29)
CREAT SERPL-MCNC: 0.92 MG/DL (ref 0.57–1)
GLOBULIN SER CALC-MCNC: 2.4 G/DL (ref 1.5–4.5)
GLUCOSE SERPL-MCNC: 110 MG/DL (ref 65–99)
POTASSIUM SERPL-SCNC: 3.9 MMOL/L (ref 3.5–5.2)
PROT SERPL-MCNC: 6.5 G/DL (ref 6–8.5)
SODIUM SERPL-SCNC: 136 MMOL/L (ref 134–144)

## 2019-12-16 NOTE — PROGRESS NOTES
RECOMMENDATIONS:  Hemoglobin A1c is a 3 month marker of your diabetes control. Goal is less than 7% which means your average blood sugar is less than 150. Your Hemoglobin A1c is 7.2% which means your diabetes is under good control. Continue to work on your diet and exercise and take all your medications as directed.     Liver and kidney function normal.

## 2019-12-17 LAB — SPECIMEN STATUS REPORT, ROLRST: NORMAL

## 2019-12-18 LAB
HCV AB S/CO SERPL IA: <0.1 S/CO RATIO (ref 0–0.9)
SPECIMEN STATUS REPORT, ROLRST: NORMAL

## 2020-01-09 DIAGNOSIS — J45.20 MILD INTERMITTENT ASTHMA WITHOUT COMPLICATION: ICD-10-CM

## 2020-01-09 DIAGNOSIS — I10 ESSENTIAL HYPERTENSION: ICD-10-CM

## 2020-01-09 DIAGNOSIS — J20.9 ACUTE BRONCHITIS, UNSPECIFIED ORGANISM: ICD-10-CM

## 2020-01-09 DIAGNOSIS — J44.9 COPD (CHRONIC OBSTRUCTIVE PULMONARY DISEASE) WITH CHRONIC BRONCHITIS (HCC): ICD-10-CM

## 2020-01-10 RX ORDER — LORAZEPAM 0.5 MG/1
TABLET ORAL
Qty: 30 TAB | Refills: 5 | OUTPATIENT
Start: 2020-01-10 | End: 2020-09-08

## 2020-01-11 DIAGNOSIS — J20.9 ACUTE BRONCHITIS, UNSPECIFIED ORGANISM: ICD-10-CM

## 2020-01-11 DIAGNOSIS — I10 ESSENTIAL HYPERTENSION: ICD-10-CM

## 2020-01-11 DIAGNOSIS — J45.20 MILD INTERMITTENT ASTHMA WITHOUT COMPLICATION: ICD-10-CM

## 2020-01-11 DIAGNOSIS — J44.9 COPD (CHRONIC OBSTRUCTIVE PULMONARY DISEASE) WITH CHRONIC BRONCHITIS (HCC): ICD-10-CM

## 2020-01-12 RX ORDER — LORAZEPAM 0.5 MG/1
TABLET ORAL
Qty: 30 TAB | Refills: 2 | OUTPATIENT
Start: 2020-01-12

## 2020-01-16 DIAGNOSIS — J44.9 COPD (CHRONIC OBSTRUCTIVE PULMONARY DISEASE) WITH CHRONIC BRONCHITIS (HCC): ICD-10-CM

## 2020-01-16 DIAGNOSIS — I10 ESSENTIAL HYPERTENSION: ICD-10-CM

## 2020-01-16 DIAGNOSIS — J45.20 MILD INTERMITTENT ASTHMA WITHOUT COMPLICATION: ICD-10-CM

## 2020-01-16 DIAGNOSIS — J20.9 ACUTE BRONCHITIS, UNSPECIFIED ORGANISM: ICD-10-CM

## 2020-01-16 RX ORDER — LORAZEPAM 0.5 MG/1
TABLET ORAL
Qty: 30 TAB | Refills: 2 | OUTPATIENT
Start: 2020-01-16

## 2020-03-06 DIAGNOSIS — K21.9 GASTROESOPHAGEAL REFLUX DISEASE, ESOPHAGITIS PRESENCE NOT SPECIFIED: ICD-10-CM

## 2020-03-06 RX ORDER — OMEPRAZOLE 20 MG/1
CAPSULE, DELAYED RELEASE ORAL
Qty: 30 CAP | Refills: 2 | Status: SHIPPED | OUTPATIENT
Start: 2020-03-06 | End: 2020-06-04

## 2020-03-19 DIAGNOSIS — G89.29 CHRONIC BILATERAL LOW BACK PAIN WITHOUT SCIATICA: ICD-10-CM

## 2020-03-19 DIAGNOSIS — M54.50 CHRONIC BILATERAL LOW BACK PAIN WITHOUT SCIATICA: ICD-10-CM

## 2020-03-19 DIAGNOSIS — E11.42 TYPE 2 DIABETES MELLITUS WITH DIABETIC POLYNEUROPATHY, WITHOUT LONG-TERM CURRENT USE OF INSULIN (HCC): ICD-10-CM

## 2020-03-19 RX ORDER — GABAPENTIN 300 MG/1
CAPSULE ORAL
Qty: 90 CAP | Refills: 4 | Status: SHIPPED | OUTPATIENT
Start: 2020-03-19 | End: 2020-09-23

## 2020-03-20 ENCOUNTER — TELEPHONE (OUTPATIENT)
Dept: FAMILY MEDICINE CLINIC | Age: 65
End: 2020-03-20

## 2020-03-20 DIAGNOSIS — J44.1 COPD EXACERBATION (HCC): Primary | ICD-10-CM

## 2020-03-20 RX ORDER — PREDNISONE 10 MG/1
10 TABLET ORAL 2 TIMES DAILY
Qty: 10 TAB | Refills: 0 | Status: SHIPPED | OUTPATIENT
Start: 2020-03-20 | End: 2020-03-25

## 2020-03-20 RX ORDER — DOXYCYCLINE 100 MG/1
100 CAPSULE ORAL 2 TIMES DAILY
Qty: 20 CAP | Refills: 0 | Status: SHIPPED | OUTPATIENT
Start: 2020-03-20 | End: 2020-03-30

## 2020-03-20 NOTE — TELEPHONE ENCOUNTER
----- Message from Tanya Goss sent at 3/20/2020 12:01 PM EDT -----  Regarding: [place]/refill  Contact: [phone]  Medication Refill    Caller (if not patient):      Relationship of caller (if not patient):      Best contact number(s):[phone]      Name of medication and dosage if known:Prednisone and antibiotic      Is patient out of this medication (yes/no):yes      Pharmacy name:[facility]    Pharmacy listed in chart? (yes/no):yes  Pharmacy phone number:      Details to clarify the request:      Tanya Goss

## 2020-03-20 NOTE — TELEPHONE ENCOUNTER
Verified patient with two type of identifiers. Pt states has bronchitis \"like she does every year\". Pt states productive cough x 2 weeks. Pt denies fever, SOB, travel or known exposure.

## 2020-03-20 NOTE — TELEPHONE ENCOUNTER
Will send Doxy and short burst of prednisone. Patient has hx of COPD and occasional exacerbations. Orders Placed This Encounter    doxycycline (VIBRAMYCIN) 100 mg capsule     Sig: Take 1 Cap by mouth two (2) times a day for 10 days. Dispense:  20 Cap     Refill:  0    predniSONE (DELTASONE) 10 mg tablet     Sig: Take 10 mg by mouth two (2) times a day for 5 days.      Dispense:  10 Tab     Refill:  0     Since patient is at high risk for complications from DTVKR-09, she should pay attention to her symptoms, and if her condition worsens, she should seek emergent care

## 2020-03-20 NOTE — TELEPHONE ENCOUNTER
Verified patient with two type of identifiers.  Informed pt of prescriptions and suggestions per Ashley Joe NP.

## 2020-04-07 DIAGNOSIS — M54.50 CHRONIC BILATERAL LOW BACK PAIN WITHOUT SCIATICA: ICD-10-CM

## 2020-04-07 DIAGNOSIS — G89.29 CHRONIC BILATERAL LOW BACK PAIN WITHOUT SCIATICA: ICD-10-CM

## 2020-04-07 RX ORDER — LIDOCAINE 50 MG/G
PATCH TOPICAL
Qty: 30 PATCH | Refills: 3 | Status: SHIPPED | OUTPATIENT
Start: 2020-04-07 | End: 2020-10-26

## 2020-05-05 RX ORDER — SITAGLIPTIN AND METFORMIN HYDROCHLORIDE 500; 50 MG/1; MG/1
TABLET, FILM COATED ORAL
Qty: 60 TAB | Refills: 0 | Status: SHIPPED | OUTPATIENT
Start: 2020-05-05 | End: 2020-05-05

## 2020-05-05 RX ORDER — SITAGLIPTIN AND METFORMIN HYDROCHLORIDE 500; 50 MG/1; MG/1
TABLET, FILM COATED ORAL
Qty: 180 TAB | Refills: 3 | Status: SHIPPED | OUTPATIENT
Start: 2020-05-05 | End: 2021-03-31

## 2020-05-05 NOTE — TELEPHONE ENCOUNTER
Provider out of office.      Last OV:12/13/2019  Next Appt:none   Last Refill:5/6/2019     Requested Prescriptions     Pending Prescriptions Disp Refills    Janumet  mg per tablet [Pharmacy Med Name: JANUMET  MG TABLET] 60 Tab 0     Sig: TAKE ONE TABLET BY MOUTH TWICE A DAY WITH MEALS

## 2020-05-06 RX ORDER — SITAGLIPTIN AND METFORMIN HYDROCHLORIDE 500; 50 MG/1; MG/1
TABLET, FILM COATED ORAL
Qty: 60 TAB | Refills: 2 | OUTPATIENT
Start: 2020-05-06

## 2020-06-04 DIAGNOSIS — J45.20 MILD INTERMITTENT ASTHMA WITHOUT COMPLICATION: ICD-10-CM

## 2020-06-04 DIAGNOSIS — J44.9 COPD (CHRONIC OBSTRUCTIVE PULMONARY DISEASE) WITH CHRONIC BRONCHITIS (HCC): ICD-10-CM

## 2020-06-04 DIAGNOSIS — I10 ESSENTIAL HYPERTENSION: ICD-10-CM

## 2020-06-04 DIAGNOSIS — K21.9 GASTROESOPHAGEAL REFLUX DISEASE, ESOPHAGITIS PRESENCE NOT SPECIFIED: ICD-10-CM

## 2020-06-04 DIAGNOSIS — J20.9 ACUTE BRONCHITIS, UNSPECIFIED ORGANISM: ICD-10-CM

## 2020-06-04 RX ORDER — OMEPRAZOLE 20 MG/1
CAPSULE, DELAYED RELEASE ORAL
Qty: 30 CAP | Refills: 5 | Status: SHIPPED | OUTPATIENT
Start: 2020-06-04 | End: 2020-12-01

## 2020-06-04 RX ORDER — BUDESONIDE AND FORMOTEROL FUMARATE DIHYDRATE 160; 4.5 UG/1; UG/1
AEROSOL RESPIRATORY (INHALATION)
Qty: 1 INHALER | Refills: 10 | Status: SHIPPED | OUTPATIENT
Start: 2020-06-04 | End: 2021-04-29

## 2020-06-09 RX ORDER — LANCETS 33 GAUGE
EACH MISCELLANEOUS
Qty: 100 LANCET | Refills: 0 | Status: SHIPPED | OUTPATIENT
Start: 2020-06-09 | End: 2021-07-06 | Stop reason: SDUPTHER

## 2020-06-09 NOTE — TELEPHONE ENCOUNTER
PROVIDER OUT OF OFFICE.      Last OV:12/13/2019  Next Appt:none  Last Refill: 3/22/2019    Requested Prescriptions     Pending Prescriptions Disp Refills    lancets (One Touch Delica) 33 gauge misc [Pharmacy Med Name: Sushil Martell 07X LANCETS] 483 Lancet 0     Sig: Monitor BG once daily and as needed  Dx: L66.39

## 2020-07-01 DIAGNOSIS — J44.9 COPD (CHRONIC OBSTRUCTIVE PULMONARY DISEASE) WITH CHRONIC BRONCHITIS (HCC): ICD-10-CM

## 2020-07-02 DIAGNOSIS — J44.9 COPD (CHRONIC OBSTRUCTIVE PULMONARY DISEASE) WITH CHRONIC BRONCHITIS (HCC): ICD-10-CM

## 2020-07-02 RX ORDER — ALBUTEROL SULFATE 90 UG/1
AEROSOL, METERED RESPIRATORY (INHALATION)
Qty: 18 G | Refills: 10 | OUTPATIENT
Start: 2020-07-02

## 2020-07-02 RX ORDER — ALBUTEROL SULFATE 90 UG/1
AEROSOL, METERED RESPIRATORY (INHALATION)
Qty: 18 G | Refills: 10 | Status: SHIPPED | OUTPATIENT
Start: 2020-07-02 | End: 2021-09-06

## 2020-07-15 DIAGNOSIS — I10 ESSENTIAL HYPERTENSION: ICD-10-CM

## 2020-07-15 DIAGNOSIS — J44.9 COPD (CHRONIC OBSTRUCTIVE PULMONARY DISEASE) WITH CHRONIC BRONCHITIS (HCC): ICD-10-CM

## 2020-07-15 DIAGNOSIS — J20.9 ACUTE BRONCHITIS, UNSPECIFIED ORGANISM: ICD-10-CM

## 2020-07-15 DIAGNOSIS — J45.20 MILD INTERMITTENT ASTHMA WITHOUT COMPLICATION: ICD-10-CM

## 2020-07-15 RX ORDER — METOPROLOL SUCCINATE 25 MG/1
TABLET, EXTENDED RELEASE ORAL
Qty: 45 TAB | Refills: 2 | Status: SHIPPED | OUTPATIENT
Start: 2020-07-15 | End: 2021-06-01

## 2020-08-03 DIAGNOSIS — I10 ESSENTIAL HYPERTENSION: ICD-10-CM

## 2020-08-03 RX ORDER — ROSUVASTATIN CALCIUM 5 MG/1
TABLET, COATED ORAL
Qty: 90 TAB | Refills: 3 | Status: SHIPPED
Start: 2020-08-03 | End: 2021-07-09

## 2020-09-02 ENCOUNTER — DOCUMENTATION ONLY (OUTPATIENT)
Dept: FAMILY MEDICINE CLINIC | Age: 65
End: 2020-09-02

## 2020-09-08 DIAGNOSIS — J20.9 ACUTE BRONCHITIS, UNSPECIFIED ORGANISM: ICD-10-CM

## 2020-09-08 DIAGNOSIS — I10 ESSENTIAL HYPERTENSION: ICD-10-CM

## 2020-09-08 DIAGNOSIS — J44.9 COPD (CHRONIC OBSTRUCTIVE PULMONARY DISEASE) WITH CHRONIC BRONCHITIS (HCC): ICD-10-CM

## 2020-09-08 DIAGNOSIS — J45.20 MILD INTERMITTENT ASTHMA WITHOUT COMPLICATION: ICD-10-CM

## 2020-09-08 RX ORDER — LORAZEPAM 0.5 MG/1
TABLET ORAL
Qty: 30 TAB | Refills: 2 | Status: SHIPPED | OUTPATIENT
Start: 2020-09-08 | End: 2021-10-13

## 2020-09-08 NOTE — TELEPHONE ENCOUNTER
Reviewed report generated by the [facility]. Does not demonstrate aberrancies or inconsistencies with regard to the prescribing of controlled medications to this patient by other providers. Only used 2 refills of Ativan prescription from Jan 2020 Orders Placed This Encounter  LORazepam (ATIVAN) 0.5 mg tablet Sig: TAKE ONE TABLET BY MOUTH ONCE NIGHTLY AS NEEDED FOR SLEEP Dispense:  30 Tab Refill:  2 Has follow up in Dec 2020

## 2020-09-23 DIAGNOSIS — M54.50 CHRONIC BILATERAL LOW BACK PAIN WITHOUT SCIATICA: ICD-10-CM

## 2020-09-23 DIAGNOSIS — G89.29 CHRONIC BILATERAL LOW BACK PAIN WITHOUT SCIATICA: ICD-10-CM

## 2020-09-23 DIAGNOSIS — E11.42 TYPE 2 DIABETES MELLITUS WITH DIABETIC POLYNEUROPATHY, WITHOUT LONG-TERM CURRENT USE OF INSULIN (HCC): ICD-10-CM

## 2020-09-23 RX ORDER — GABAPENTIN 300 MG/1
CAPSULE ORAL
Qty: 90 CAP | Refills: 2 | Status: SHIPPED | OUTPATIENT
Start: 2020-09-23 | End: 2020-11-04 | Stop reason: DRUGHIGH

## 2020-09-23 NOTE — TELEPHONE ENCOUNTER
Reviewed report generated by the [facility]. Does not demonstrate aberrancies or inconsistencies with regard to the prescribing of controlled medications to this patient by other providers. Orders Placed This Encounter  gabapentin (NEURONTIN) 300 mg capsule Sig: TAKE ONE CAPSULE BY MOUTH THREE TIMES A DAY Dispense:  90 Cap Refill:  2 Needs appointment, last appt in Dec 2019

## 2020-10-02 DIAGNOSIS — I10 ESSENTIAL HYPERTENSION: ICD-10-CM

## 2020-10-02 RX ORDER — LOSARTAN POTASSIUM AND HYDROCHLOROTHIAZIDE 25; 100 MG/1; MG/1
TABLET ORAL
Qty: 30 TAB | Refills: 2 | Status: SHIPPED | OUTPATIENT
Start: 2020-10-02 | End: 2021-02-01

## 2020-10-23 ENCOUNTER — TELEPHONE (OUTPATIENT)
Dept: FAMILY MEDICINE CLINIC | Age: 65
End: 2020-10-23

## 2020-10-23 NOTE — TELEPHONE ENCOUNTER
----- Message from Katt Martines sent at 10/23/2020  1:05 PM EDT -----  Regarding: NP Balbir/ telephone  General Message/Vendor Calls    Caller's first and last name: Pt      Reason for call: Pt is experiencing swelling on LEFT ankle that comes and goes       Callback required yes/no and why: yes, consult and/or schedule       Best contact number(s): [phone]      Details to clarify the request: pt will be faxing over pictures for NP's reference       Katt Martines

## 2020-10-23 NOTE — TELEPHONE ENCOUNTER
Verified patient with two type of identifiers. Pt states in the last year her ankle will randomly become red, and painful and will go away. Pt denied SOB or chest pain. Pt offered appt Monday 10/26/20 with Dr. Truman Perkins due to provider being off and no openings and pt declined. Pt scheduled 11/4/20 at 10 AM. Pt to go to ED/UC if pain worsens. Pt verbalized understanding.

## 2020-10-25 DIAGNOSIS — G89.29 CHRONIC BILATERAL LOW BACK PAIN WITHOUT SCIATICA: ICD-10-CM

## 2020-10-25 DIAGNOSIS — M54.50 CHRONIC BILATERAL LOW BACK PAIN WITHOUT SCIATICA: ICD-10-CM

## 2020-10-26 RX ORDER — LIDOCAINE 50 MG/G
PATCH TOPICAL
Qty: 30 PATCH | Refills: 2 | Status: SHIPPED | OUTPATIENT
Start: 2020-10-26 | End: 2021-03-16

## 2020-11-04 ENCOUNTER — OFFICE VISIT (OUTPATIENT)
Dept: FAMILY MEDICINE CLINIC | Age: 65
End: 2020-11-04
Payer: COMMERCIAL

## 2020-11-04 VITALS
HEART RATE: 81 BPM | TEMPERATURE: 97.1 F | DIASTOLIC BLOOD PRESSURE: 63 MMHG | OXYGEN SATURATION: 96 % | HEIGHT: 63 IN | WEIGHT: 153 LBS | BODY MASS INDEX: 27.11 KG/M2 | RESPIRATION RATE: 14 BRPM | SYSTOLIC BLOOD PRESSURE: 116 MMHG

## 2020-11-04 DIAGNOSIS — E11.42 TYPE 2 DIABETES MELLITUS WITH DIABETIC POLYNEUROPATHY, WITHOUT LONG-TERM CURRENT USE OF INSULIN (HCC): ICD-10-CM

## 2020-11-04 DIAGNOSIS — B37.0 THRUSH: ICD-10-CM

## 2020-11-04 DIAGNOSIS — I73.9 CLAUDICATION OF BOTH LOWER EXTREMITIES (HCC): ICD-10-CM

## 2020-11-04 DIAGNOSIS — M25.572 ACUTE LEFT ANKLE PAIN: Primary | ICD-10-CM

## 2020-11-04 DIAGNOSIS — J44.1 COPD EXACERBATION (HCC): ICD-10-CM

## 2020-11-04 PROCEDURE — 99214 OFFICE O/P EST MOD 30 MIN: CPT | Performed by: NURSE PRACTITIONER

## 2020-11-04 RX ORDER — PREDNISONE 20 MG/1
20 TABLET ORAL 2 TIMES DAILY
Qty: 10 TAB | Refills: 0 | Status: SHIPPED | OUTPATIENT
Start: 2020-11-04 | End: 2020-11-09

## 2020-11-04 RX ORDER — DOXYCYCLINE 100 MG/1
100 CAPSULE ORAL 2 TIMES DAILY
Qty: 20 CAP | Refills: 0 | Status: SHIPPED | OUTPATIENT
Start: 2020-11-04 | End: 2020-11-14

## 2020-11-04 RX ORDER — GABAPENTIN 600 MG/1
600 TABLET ORAL 3 TIMES DAILY
Qty: 90 TAB | Refills: 3 | Status: SHIPPED | OUTPATIENT
Start: 2020-11-04 | End: 2021-03-02

## 2020-11-04 RX ORDER — NYSTATIN 100000 [USP'U]/ML
500000 SUSPENSION ORAL 4 TIMES DAILY
Qty: 473 ML | Refills: 0 | Status: SHIPPED | OUTPATIENT
Start: 2020-11-04 | End: 2020-11-18

## 2020-11-04 NOTE — PROGRESS NOTES
Chief Complaint   Patient presents with    Swelling     Left Ankle     Skin Problem     Pt states on and off x 1 year. 1. Have you been to the ER, urgent care clinic since your last visit? Hospitalized since your last visit? No    2. Have you seen or consulted any other health care providers outside of the [address] [place] [address] since your last visit? Include any pap smears or colon screening.  No     Eye Exam - Appt 12/7   Flu shot - Pt declined    Health Maintenance Due   Topic Date Due    Pneumococcal 0-64 years (1 of 1 - PPSV23) 12/22/1961    Eye Exam Retinal or Dilated  12/22/1965    DTaP/Tdap/Td series (1 - Tdap) 12/22/1976    Shingrix Vaccine Age 50> (1 of 2) 12/22/2005    Colorectal Cancer Screening Combo  12/22/2005    Breast Cancer Screen Mammogram  01/17/2013    MICROALBUMIN Q1  03/22/2020    Foot Exam Q1  08/08/2020    Lipid Screen  08/08/2020    Bone Densitometry (Dexa) Screening  12/22/2020

## 2020-11-04 NOTE — PROGRESS NOTES
HISTORY OF PRESENT ILLNESS  Bettie Halsted is a 59 y.o. female. HPI  Patient comes in today for ankle pain  Complains of recurring cyst/mass on lateral left ankle. States it has been occurring for last year, will come and go. States the cyst/mass becomes painful and red, but does not drain. Denies fever, chills. No injury. Complains of pain in both legs with walking. States she cannot walk to dumpster and back to home without severe pain in legs. Patient is a smoker. Patient states she is developing a flare-up of bronchitis, with increased coughing and wheezing. Would like antibiotic and prednisone. PATIENT REFUSED TO GO TO [facility] FOR ROUTINE LABS. PATIENT TO SCHEDULE APPT FOR 2 MONTHS AND HOPEFULLY WE WILL HAVE LAB SERVICES BACK UP IN OFFICE.   No Known Allergies    Past Medical History:   Diagnosis Date    Acute bronchitis 4/7/2016    Atopic dermatitis 4/7/2016    Breast cancer (HCC)     Chronic obstructive pulmonary disease (HCC)     COPD (chronic obstructive pulmonary disease) with chronic bronchitis ([address] 7/26/2018    Essential hypertension 3/31/2016    Eye problems 3/31/2016    EVER (generalized anxiety disorder) 10/11/2018    Panic anxiety syndrome 10/11/2018    Tremor of face and hands 10/11/2018       Past Surgical History:   Procedure Laterality Date    HX BREAST LUMPECTOMY         Social History     Socioeconomic History    Marital status:      Spouse name: Not on file    Number of children: Not on file    Years of education: Not on file    Highest education level: Not on file   Occupational History    Not on file   Social Needs    Financial resource strain: Not on file    Food insecurity     Worry: Not on file     Inability: Not on file   [employer] needs     Medical: Not on file     Non-medical: Not on file   Tobacco Use    Smoking status: Current Every Day Smoker     Packs/day: 1.00     Types: Cigarettes    Smokeless tobacco: Never Used   Substance and Sexual Activity    Alcohol use: No    Drug use: No    Sexual activity: Not Currently   Lifestyle    Physical activity     Days per week: Not on file     Minutes per session: Not on file    Stress: Not on file   Relationships    Social connections     Talks on phone: Not on file     Gets together: Not on file     Attends Zoroastrian service: Not on file     Active member of club or organization: Not on file     Attends meetings of clubs or organizations: Not on file     Relationship status: Not on file    Intimate partner violence     Fear of current or ex partner: Not on file     Emotionally abused: Not on file     Physically abused: Not on file     Forced sexual activity: Not on file   Other Topics Concern    Not on file   Social History Narrative    Not on file       Family History   Problem Relation Age of Onset    Diabetes Father     Arthritis-osteo Sister     Diabetes Sister     Gall Bladder Disease Sister        Current Outpatient Medications   Medication Sig    lidocaine (LIDODERM) 5 % APPLY 1 PATCH TO AFFECTED AREA FOR 12 HOURS IN A 24 HOUR PERIOD    losartan-hydroCHLOROthiazide (HYZAAR) 100-25 mg per tablet TAKE ONE TABLET BY MOUTH DAILY    gabapentin (NEURONTIN) 300 mg capsule TAKE ONE CAPSULE BY MOUTH THREE TIMES A DAY (Patient taking differently: 900 mg daily.)    LORazepam (ATIVAN) 0.5 mg tablet TAKE ONE TABLET BY MOUTH ONCE NIGHTLY AS NEEDED FOR SLEEP    metoprolol succinate (TOPROL-XL) 25 mg XL tablet TAKE ONE-HALF TABLET BY MOUTH DAILY    albuterol (PROVENTIL HFA, VENTOLIN HFA, PROAIR HFA) 90 mcg/actuation inhaler INHALE TWO PUFFS BY MOUTH EVERY 4 HOURS AS NEEDED FOR WHEEZING AND SHORTNESS OF BREATH    lancets (One Touch Delica) 33 gauge misc Monitor BG once daily and as needed  Dx: E11.42    Symbicort 160-4.5 mcg/actuation HFAA INHALE TWO PUFFS BY MOUTH TWICE A DAY    omeprazole (PRILOSEC) 20 mg capsule TAKE ONE CAPSULE BY MOUTH DAILY    Janumet  mg per tablet TAKE ONE TABLET BY MOUTH TWICE A DAY WITH MEALS (Patient taking differently: daily.)    albuterol-ipratropium (DUO-NEB) 2.5 mg-0.5 mg/3 ml nebu INHALE THREE MILLILITERS VIA NEBULIZATION BY MOUTH EVERY 4 HOURS AS NEEDED FOR WHEEZING AND SHORTNESS OF BREATH    cholecalciferol (VITAMIN D3) 1,000 unit tablet Take  by mouth daily.  Blood-Glucose Meter monitoring kit Monitor BG once daily and as needed  Dx: E11.42  Provide with glucometer that insurance covers.  lancets misc Monitor BG once daily and as needed  Dx: E11.42  Provide with glucometer/lancets that insurance covers.  glucose blood VI test strips (BLOOD GLUCOSE TEST) strip Monitor BG once daily and as needed  Dx: E11.42  Provide with glucometer/strips that insurance covers.  rosuvastatin (CRESTOR) 5 mg tablet TAKE ONE TABLET BY MOUTH ONCE NIGHTLY (Patient not taking: Reported on 11/4/2020)    varenicline (CHANTIX) 1 mg tablet Take 1 Tab by mouth two (2) times a day. (Patient not taking: Reported on 11/4/2020)     No current facility-administered medications for this visit. Review of Systems   Constitutional: Negative for chills and fever. Respiratory: Positive for cough, sputum production, shortness of breath and wheezing. Negative for hemoptysis. Cardiovascular: Positive for claudication. Negative for chest pain and palpitations. Musculoskeletal: Positive for joint pain (left ankle). Skin:        Cyst/mass lateral left ankle, comes and goes     Vitals:    11/04/20 1010   BP: 116/63   Pulse: 81   Resp: 14   Temp: 97.1 °F (36.2 °C)   TempSrc: Skin   SpO2: 96%   Weight: 153 lb (69.4 kg)   Height: 5' 3\" (1.6 m)     Physical Exam  Vitals signs reviewed. Constitutional:       Appearance: Normal appearance. She is well-developed, well-groomed and normal weight. Neck:      Thyroid: No thyromegaly. Cardiovascular:      Rate and Rhythm: Normal rate and regular rhythm.       Pulses:           Dorsalis pedis pulses are 1+ on the right side and 1+ on the left side. Heart sounds: Normal heart sounds, S1 normal and S2 normal.   Pulmonary:      Effort: Pulmonary effort is normal.      Breath sounds: Wheezing present. No decreased breath sounds or rhonchi. Abdominal:      General: Bowel sounds are normal.      Palpations: Abdomen is soft. Tenderness: There is no abdominal tenderness. Musculoskeletal: Normal range of motion. Left ankle: She exhibits swelling (cystic mass noted lateral ankle, ~2-3cm, mild TTP, no erythema, no drainage). Tenderness. Lateral malleolus tenderness found. No medial malleolus tenderness found. Right lower leg: No edema. Left lower leg: No edema. Skin:     General: Skin is warm and dry. Neurological:      Mental Status: She is alert and oriented to person, place, and time. Psychiatric:         Attention and Perception: Attention normal.         Mood and Affect: Mood normal.         Speech: Speech normal.         Behavior: Behavior normal. Behavior is cooperative. Thought Content: Thought content normal.       ASSESSMENT and PLAN    ICD-10-CM ICD-9-CM    1. Acute left ankle pain  M25.572 719.47 XR ANKLE LT MIN 3 V      REFERRAL TO PODIATRY   2. Type 2 diabetes mellitus with diabetic polyneuropathy, without long-term current use of insulin ([facility])  E11.42 250.60 gabapentin (NEURONTIN) 600 mg tablet     357.2    3. COPD exacerbation ([facility])  J44.1 491.21 predniSONE (DELTASONE) 20 mg tablet      doxycycline (VIBRAMYCIN) 100 mg capsule   4. Claudication of both lower extremities ([facility])  I73.9 443. 9 ANKLE BRACHIAL INDEX   5. Thrush  B37.0 112.0 nystatin (MYCOSTATIN) 100,000 unit/mL suspension     Encounter Diagnoses   Name Primary?     Acute left ankle pain Yes    Type 2 diabetes mellitus with diabetic polyneuropathy, without long-term current use of insulin ([facility])     COPD exacerbation ([facility])     Claudication of both lower extremities ([facility])     Thrush      Orders Placed This Encounter    XR ANKLE LT MIN 3 V    REFERRAL TO PODIATRY    gabapentin (NEURONTIN) 600 mg tablet    predniSONE (DELTASONE) 20 mg tablet    doxycycline (VIBRAMYCIN) 100 mg capsule    nystatin (MYCOSTATIN) 100,000 unit/mL suspension     Diagnoses and all orders for this visit:    1. Acute left ankle pain / cystic mass lateral left ankle. Will check xray, patient to schedule appt with podiatry  -     XR ANKLE LT MIN 3 V; Future  -     REFERRAL TO PODIATRY    2. Type 2 diabetes mellitus with diabetic polyneuropathy, without long-term current use of insulin (HCC)  -     gabapentin (NEURONTIN) 600 mg tablet; Take 1 Tab by mouth three (3) times daily. Max Daily Amount: 1,800 mg.    3. COPD exacerbation (HCC)  -     predniSONE (DELTASONE) 20 mg tablet; Take 20 mg by mouth two (2) times a day for 5 days. -     doxycycline (VIBRAMYCIN) 100 mg capsule; Take 1 Cap by mouth two (2) times a day for 10 days. 4. Claudication of both lower extremities ([facility] [address]  -     ANKLE BRACHIAL INDEX; Future    5. Thrush  -     nystatin (MYCOSTATIN) 100,000 unit/mL suspension; Take 5 mL by mouth four (4) times daily for 14 days. swish and spit      Follow-up and Dispositions    · Return if symptoms worsen or fail to improve. radiology results and schedule of future radiology studies reviewed with patient    I have reviewed the patient's allergies and made any necessary changes. Medical, procedural, social and family histories have been reviewed and updated as medically indicated. I have reconciled and/or revised patient medications in the EMR. I have discussed each diagnosis listed in this note with Rosendo Batamrita and/or their family. I have discussed treatment options and the risk/benefit analysis of those options, including safe use of medications and possible medication side effects. Through the use of shared decision making we have agreed to the above plan.       The patient has received an after-visit summary and questions were answered concerning future plans. Rosalie Mcgregor, MELLISA-C

## 2020-12-01 DIAGNOSIS — K21.9 GASTROESOPHAGEAL REFLUX DISEASE, UNSPECIFIED WHETHER ESOPHAGITIS PRESENT: ICD-10-CM

## 2020-12-01 RX ORDER — OMEPRAZOLE 20 MG/1
CAPSULE, DELAYED RELEASE ORAL
Qty: 30 CAP | Refills: 4 | Status: SHIPPED | OUTPATIENT
Start: 2020-12-01 | End: 2021-10-11

## 2020-12-15 DIAGNOSIS — I10 ESSENTIAL HYPERTENSION: ICD-10-CM

## 2020-12-15 RX ORDER — IPRATROPIUM BROMIDE AND ALBUTEROL SULFATE 2.5; .5 MG/3ML; MG/3ML
SOLUTION RESPIRATORY (INHALATION)
Qty: 75 NEBULE | Refills: 3 | Status: SHIPPED | OUTPATIENT
Start: 2020-12-15 | End: 2021-03-16

## 2021-01-07 ENCOUNTER — DOCUMENTATION ONLY (OUTPATIENT)
Dept: FAMILY MEDICINE CLINIC | Age: 66
End: 2021-01-07

## 2021-01-11 NOTE — PROGRESS NOTES
Outcome  Denied on January 8  Denied. We are unable to approve your request for this drug. This drug is approved to treat pain due to diabetic neuropathy (nerve damage due to diabetes), pain due to cancer, or nerve pain caused by a rash due to Shingles (a viral infection). We do not show that you are using it for any of these reasons; therefore, this request is denied. For prescribers/practitioner: Approved indications are diabetic neuropathy, pain due to cancer, or post-herpetic neuralgia.

## 2021-01-31 DIAGNOSIS — I10 ESSENTIAL HYPERTENSION: ICD-10-CM

## 2021-02-01 RX ORDER — LOSARTAN POTASSIUM AND HYDROCHLOROTHIAZIDE 25; 100 MG/1; MG/1
TABLET ORAL
Qty: 90 TAB | Refills: 3 | Status: SHIPPED | OUTPATIENT
Start: 2021-02-01 | End: 2022-01-25

## 2021-02-04 RX ORDER — DOXYCYCLINE 100 MG/1
100 CAPSULE ORAL 2 TIMES DAILY
Qty: 20 CAP | Refills: 0 | Status: SHIPPED | OUTPATIENT
Start: 2021-02-04 | End: 2021-02-14

## 2021-02-04 RX ORDER — PREDNISONE 20 MG/1
20 TABLET ORAL 2 TIMES DAILY
Qty: 10 TAB | Refills: 0 | Status: SHIPPED | OUTPATIENT
Start: 2021-02-04 | End: 2021-02-09

## 2021-02-04 NOTE — TELEPHONE ENCOUNTER
Patient should seek urgent care if no improvement in symptoms in 3-4 days or if they worsen. If she start running a fever on antibiotic, she needs to be evaluated.

## 2021-02-04 NOTE — PROGRESS NOTES
Spoke with pt today and she states she uses her lidocaine pathces for pain related to her diabetic neuropathy. Informed pt would verify dx with provider and resubmit PA if applicable.

## 2021-02-04 NOTE — TELEPHONE ENCOUNTER
----- Message from Tom Duong sent at 2/4/2021  8:13 AM EST -----  Regarding: Balbir/Telephone  Contact: [phone]  General Message/Vendor Calls    Caller's first and last name: N/A      Reason for call: Bronchitis      Callback required yes/no and why: Yes/Confirm      Best contact number(s):[phone]      Details to clarify the request: Patient would like the doctor to send an antibiotic and pregnazone to [facility] which is in her chart.       Tom Duong

## 2021-02-04 NOTE — TELEPHONE ENCOUNTER
[facility] [address], [address] [place] [address], [address] [place], [place] [address] [address]  [phone] fax    Discharge Summary    Patient name: Keith Hernandez     Start of Care: 18  Referral source: Tristan Monae MD    : 1959  Medical/Treatment Diagnosis: Low back pain [M54.5]  Pain in right shoulder [M25.511]  Onset Date:2017 Right shoulder, and back pain longstanding P/O   Prior Hospitalization: see medical history   Provider#: [id number]  Comorbidities: neck and back surgery, depression, right frozen shoulder   Prior Level of Function: :I all areas of ADLs and activities, using Left SC, household chores , community         Medications: Verified on Patient Summary List    Visits from Start of Care: 12    Missed Visits: 0  Reporting Period : 18 to 18      Summary of Care:Patient seen for 12 skilled sessions. Please see latest MD note for specifics. She requested discharge due to pending hip surgery. She should follow up with her MD as needed. Thank you.    Goal:patient will have established and be I with HEP to aid with progression of skilled PT program                        Status at last note/certification:eval  Status at discharge: met    Marine Sarath will have pain 5/10 to aid with increase tolerance to ADLS and activities  Status at last note/certification:eval  Status at discharge: met, achy    Goal: patient will have shoulder FOTO  57 to show significant improved tolerance to overhead reaching at ho  Status at last note/certification:eval  Status at discharge: not met, [facility] will [facility] overall 50% improvement to aid with increase tolerance to household chores, walking and standing                Status at last note/certification:eval  Status at discharge: not met, 30%      ASSESSMENT/RECOMMENDATIONS:  []Discontinue therapy progressing towards or have reached established goals  []Discontinue therapy due to lack of appreciable Verified patient with two type of identifiers. Pt states x 1 week her shortness of breath seems to be worse with her cough and her cough is not productive. Pt also using albuterol treatments more often. Requested Prescriptions     Pending Prescriptions Disp Refills    predniSONE (DELTASONE) 20 mg tablet 10 Tab 0     Sig: Take 20 mg by mouth two (2) times a day for 5 days.  doxycycline (VIBRAMYCIN) 100 mg capsule 20 Cap 0     Sig: Take 1 Cap by mouth two (2) times a day for 10 days. progress towards goals  []Discontinue therapy due to lack of attendance or compliance  [x]Other:patient cancelled remaining appointments and is being discharged due to hip surgery  Thank you for this referral.     Claudia Vera, PT 8/2/2018 4:28 PM

## 2021-02-04 NOTE — TELEPHONE ENCOUNTER
Verified patient with two type of identifiers. Informed pt of suggestions/instructions per Kayla Barron NP. Pt verbalized understanding.

## 2021-03-02 DIAGNOSIS — E11.42 TYPE 2 DIABETES MELLITUS WITH DIABETIC POLYNEUROPATHY, WITHOUT LONG-TERM CURRENT USE OF INSULIN (HCC): ICD-10-CM

## 2021-03-02 RX ORDER — GABAPENTIN 600 MG/1
TABLET ORAL
Qty: 90 TAB | Refills: 5 | Status: SHIPPED | OUTPATIENT
Start: 2021-03-02 | End: 2021-07-06 | Stop reason: SDUPTHER

## 2021-03-03 NOTE — TELEPHONE ENCOUNTER
Reviewed report generated by the [facility]. Does not demonstrate aberrancies or inconsistencies with regard to the prescribing of controlled medications to this patient by other providers. Orders Placed This Encounter  gabapentin (NEURONTIN) 600 mg tablet Sig: TAKE ONE TABLET BY MOUTH THREE TIMES A DAY Dispense:  90 Tab Refill:  5

## 2021-03-15 DIAGNOSIS — M54.50 CHRONIC BILATERAL LOW BACK PAIN WITHOUT SCIATICA: ICD-10-CM

## 2021-03-15 DIAGNOSIS — G89.29 CHRONIC BILATERAL LOW BACK PAIN WITHOUT SCIATICA: ICD-10-CM

## 2021-03-15 DIAGNOSIS — I10 ESSENTIAL HYPERTENSION: ICD-10-CM

## 2021-03-16 RX ORDER — LIDOCAINE 50 MG/G
PATCH TOPICAL
Qty: 30 PATCH | Refills: 5 | Status: SHIPPED | OUTPATIENT
Start: 2021-03-16 | End: 2021-09-30

## 2021-03-16 RX ORDER — IPRATROPIUM BROMIDE AND ALBUTEROL SULFATE 2.5; .5 MG/3ML; MG/3ML
SOLUTION RESPIRATORY (INHALATION)
Qty: 30 NEBULE | Refills: 5 | Status: SHIPPED | OUTPATIENT
Start: 2021-03-16 | End: 2021-11-10

## 2021-03-31 RX ORDER — SITAGLIPTIN AND METFORMIN HYDROCHLORIDE 500; 50 MG/1; MG/1
TABLET, FILM COATED ORAL
Qty: 180 TAB | Refills: 2 | Status: SHIPPED | OUTPATIENT
Start: 2021-03-31 | End: 2021-11-10 | Stop reason: SDUPTHER

## 2021-04-29 DIAGNOSIS — J45.20 MILD INTERMITTENT ASTHMA WITHOUT COMPLICATION: ICD-10-CM

## 2021-04-29 DIAGNOSIS — I10 ESSENTIAL HYPERTENSION: ICD-10-CM

## 2021-04-29 DIAGNOSIS — J20.9 ACUTE BRONCHITIS, UNSPECIFIED ORGANISM: ICD-10-CM

## 2021-04-29 DIAGNOSIS — J44.9 COPD (CHRONIC OBSTRUCTIVE PULMONARY DISEASE) WITH CHRONIC BRONCHITIS (HCC): ICD-10-CM

## 2021-04-29 RX ORDER — BUDESONIDE AND FORMOTEROL FUMARATE DIHYDRATE 160; 4.5 UG/1; UG/1
AEROSOL RESPIRATORY (INHALATION)
Qty: 1 INHALER | Refills: 0 | Status: SHIPPED | OUTPATIENT
Start: 2021-04-29 | End: 2021-05-03

## 2021-04-29 NOTE — TELEPHONE ENCOUNTER
PROVIDER OUT OF OFFICE.      Last OV: 11/4/20  Next Appt: none  Last Refill: 6/4/2020 (1+R10)    Requested Prescriptions     Pending Prescriptions Disp Refills    Symbicort 160-4.5 mcg/actuation HFAA [Pharmacy Med Name: SYMBICORT 160-4.5 MCG INHALER] 1 Inhaler 0     Sig: INHALE TWO PUFFS BY MOUTH TWICE A DAY

## 2021-05-02 DIAGNOSIS — J45.20 MILD INTERMITTENT ASTHMA WITHOUT COMPLICATION: ICD-10-CM

## 2021-05-02 DIAGNOSIS — J20.9 ACUTE BRONCHITIS, UNSPECIFIED ORGANISM: ICD-10-CM

## 2021-05-02 DIAGNOSIS — J44.9 COPD (CHRONIC OBSTRUCTIVE PULMONARY DISEASE) WITH CHRONIC BRONCHITIS (HCC): ICD-10-CM

## 2021-05-02 DIAGNOSIS — I10 ESSENTIAL HYPERTENSION: ICD-10-CM

## 2021-05-03 RX ORDER — BUDESONIDE AND FORMOTEROL FUMARATE DIHYDRATE 160; 4.5 UG/1; UG/1
AEROSOL RESPIRATORY (INHALATION)
Qty: 3 INHALER | Refills: 3 | Status: SHIPPED | OUTPATIENT
Start: 2021-05-03 | End: 2022-06-08 | Stop reason: SDUPTHER

## 2021-05-07 ENCOUNTER — DOCUMENTATION ONLY (OUTPATIENT)
Dept: FAMILY MEDICINE CLINIC | Age: 66
End: 2021-05-07

## 2021-05-07 NOTE — PROGRESS NOTES
Submitted PA through covermymeds (KEY: [id number]). Additional Information Required  Please reach out to the [facility] for further assistance [phone], then transferred to [facility] [phone] was then told I need to contact pt's Medicare insurance directly. Per covermymeds; covered formulary are as followed;  Pt does not have any on previous medication list.     Anni Dhillon  Combivent  Anoro

## 2021-05-26 ENCOUNTER — TELEPHONE (OUTPATIENT)
Dept: FAMILY MEDICINE CLINIC | Age: 66
End: 2021-05-26

## 2021-05-26 NOTE — TELEPHONE ENCOUNTER
Patient is having eye surgery on July 14th & 23 rd, and she needs a pre-op appt. Please call @[phone].

## 2021-05-28 DIAGNOSIS — J44.9 COPD (CHRONIC OBSTRUCTIVE PULMONARY DISEASE) WITH CHRONIC BRONCHITIS (HCC): ICD-10-CM

## 2021-05-28 DIAGNOSIS — I10 ESSENTIAL HYPERTENSION: ICD-10-CM

## 2021-05-28 DIAGNOSIS — J45.20 MILD INTERMITTENT ASTHMA WITHOUT COMPLICATION: ICD-10-CM

## 2021-05-28 DIAGNOSIS — J20.9 ACUTE BRONCHITIS, UNSPECIFIED ORGANISM: ICD-10-CM

## 2021-06-01 RX ORDER — METOPROLOL SUCCINATE 25 MG/1
TABLET, EXTENDED RELEASE ORAL
Qty: 45 TABLET | Refills: 2 | Status: SHIPPED | OUTPATIENT
Start: 2021-06-01 | End: 2022-02-27

## 2021-07-06 ENCOUNTER — OFFICE VISIT (OUTPATIENT)
Dept: FAMILY MEDICINE CLINIC | Age: 66
End: 2021-07-06
Payer: MEDICARE

## 2021-07-06 VITALS
DIASTOLIC BLOOD PRESSURE: 45 MMHG | OXYGEN SATURATION: 98 % | SYSTOLIC BLOOD PRESSURE: 104 MMHG | HEART RATE: 90 BPM | TEMPERATURE: 97.8 F | HEIGHT: 63 IN | RESPIRATION RATE: 12 BRPM | BODY MASS INDEX: 25.73 KG/M2 | WEIGHT: 145.2 LBS

## 2021-07-06 DIAGNOSIS — Z01.818 PREOPERATIVE GENERAL PHYSICAL EXAMINATION: Primary | ICD-10-CM

## 2021-07-06 DIAGNOSIS — J44.9 COPD (CHRONIC OBSTRUCTIVE PULMONARY DISEASE) WITH CHRONIC BRONCHITIS (HCC): ICD-10-CM

## 2021-07-06 DIAGNOSIS — M79.671 RIGHT FOOT PAIN: ICD-10-CM

## 2021-07-06 DIAGNOSIS — I10 ESSENTIAL HYPERTENSION: ICD-10-CM

## 2021-07-06 DIAGNOSIS — I73.9 CLAUDICATION OF BOTH LOWER EXTREMITIES (HCC): ICD-10-CM

## 2021-07-06 DIAGNOSIS — Z72.0 TOBACCO ABUSE: ICD-10-CM

## 2021-07-06 DIAGNOSIS — J45.20 MILD INTERMITTENT ASTHMA WITHOUT COMPLICATION: ICD-10-CM

## 2021-07-06 DIAGNOSIS — M54.50 CHRONIC BILATERAL LOW BACK PAIN WITHOUT SCIATICA: ICD-10-CM

## 2021-07-06 DIAGNOSIS — E78.2 MIXED HYPERLIPIDEMIA: ICD-10-CM

## 2021-07-06 DIAGNOSIS — M54.2 NECK PAIN: ICD-10-CM

## 2021-07-06 DIAGNOSIS — E11.42 TYPE 2 DIABETES MELLITUS WITH DIABETIC POLYNEUROPATHY, WITHOUT LONG-TERM CURRENT USE OF INSULIN (HCC): ICD-10-CM

## 2021-07-06 DIAGNOSIS — G89.29 CHRONIC BILATERAL LOW BACK PAIN WITHOUT SCIATICA: ICD-10-CM

## 2021-07-06 DIAGNOSIS — H26.9 CATARACT OF BOTH EYES, UNSPECIFIED CATARACT TYPE: ICD-10-CM

## 2021-07-06 LAB
ALBUMIN SERPL-MCNC: 3.3 G/DL (ref 3.5–5)
ALBUMIN/GLOB SERPL: 0.8 {RATIO} (ref 1.1–2.2)
ALP SERPL-CCNC: 138 U/L (ref 45–117)
ALT SERPL-CCNC: 13 U/L (ref 12–78)
ANION GAP SERPL CALC-SCNC: 7 MMOL/L (ref 5–15)
AST SERPL-CCNC: 10 U/L (ref 15–37)
BILIRUB SERPL-MCNC: 0.4 MG/DL (ref 0.2–1)
BILIRUB UR QL STRIP: NEGATIVE
BUN SERPL-MCNC: 11 MG/DL (ref 6–20)
BUN/CREAT SERPL: 9 (ref 12–20)
CALCIUM SERPL-MCNC: 9.7 MG/DL (ref 8.5–10.1)
CHLORIDE SERPL-SCNC: 103 MMOL/L (ref 97–108)
CHOLEST SERPL-MCNC: 192 MG/DL
CO2 SERPL-SCNC: 29 MMOL/L (ref 21–32)
CREAT SERPL-MCNC: 1.18 MG/DL (ref 0.55–1.02)
CREAT UR-MCNC: 74.4 MG/DL
EST. AVERAGE GLUCOSE BLD GHB EST-MCNC: 148 MG/DL
GLOBULIN SER CALC-MCNC: 4 G/DL (ref 2–4)
GLUCOSE SERPL-MCNC: 126 MG/DL (ref 65–100)
GLUCOSE UR-MCNC: NEGATIVE MG/DL
HBA1C MFR BLD: 6.8 % (ref 4–5.6)
HDLC SERPL-MCNC: 45 MG/DL
HDLC SERPL: 4.3 {RATIO} (ref 0–5)
KETONES P FAST UR STRIP-MCNC: NEGATIVE MG/DL
LDLC SERPL CALC-MCNC: 121.6 MG/DL (ref 0–100)
MICROALBUMIN UR-MCNC: <0.5 MG/DL
MICROALBUMIN/CREAT UR-RTO: NORMAL MG/G (ref 0–30)
PH UR STRIP: 5.5 [PH] (ref 4.6–8)
POTASSIUM SERPL-SCNC: 4.6 MMOL/L (ref 3.5–5.1)
PROT SERPL-MCNC: 7.3 G/DL (ref 6.4–8.2)
PROT UR QL STRIP: NEGATIVE
SODIUM SERPL-SCNC: 139 MMOL/L (ref 136–145)
SP GR UR STRIP: 1.01 (ref 1–1.03)
TRIGL SERPL-MCNC: 127 MG/DL (ref ?–150)
TSH SERPL DL<=0.05 MIU/L-ACNC: 2.24 UIU/ML (ref 0.36–3.74)
UA UROBILINOGEN AMB POC: NORMAL (ref 0.2–1)
URINALYSIS CLARITY POC: NORMAL
URINALYSIS COLOR POC: YELLOW
URINE BLOOD POC: NEGATIVE
URINE LEUKOCYTES POC: NORMAL
URINE NITRITES POC: NEGATIVE
VLDLC SERPL CALC-MCNC: 25.4 MG/DL

## 2021-07-06 PROCEDURE — G8419 CALC BMI OUT NRM PARAM NOF/U: HCPCS | Performed by: NURSE PRACTITIONER

## 2021-07-06 PROCEDURE — G0463 HOSPITAL OUTPT CLINIC VISIT: HCPCS | Performed by: NURSE PRACTITIONER

## 2021-07-06 PROCEDURE — G8432 DEP SCR NOT DOC, RNG: HCPCS | Performed by: NURSE PRACTITIONER

## 2021-07-06 PROCEDURE — G8752 SYS BP LESS 140: HCPCS | Performed by: NURSE PRACTITIONER

## 2021-07-06 PROCEDURE — 1101F PT FALLS ASSESS-DOCD LE1/YR: CPT | Performed by: NURSE PRACTITIONER

## 2021-07-06 PROCEDURE — 1090F PRES/ABSN URINE INCON ASSESS: CPT | Performed by: NURSE PRACTITIONER

## 2021-07-06 PROCEDURE — G8754 DIAS BP LESS 90: HCPCS | Performed by: NURSE PRACTITIONER

## 2021-07-06 PROCEDURE — 99215 OFFICE O/P EST HI 40 MIN: CPT | Performed by: NURSE PRACTITIONER

## 2021-07-06 PROCEDURE — 2022F DILAT RTA XM EVC RTNOPTHY: CPT | Performed by: NURSE PRACTITIONER

## 2021-07-06 PROCEDURE — 3017F COLORECTAL CA SCREEN DOC REV: CPT | Performed by: NURSE PRACTITIONER

## 2021-07-06 PROCEDURE — 81003 URINALYSIS AUTO W/O SCOPE: CPT | Performed by: NURSE PRACTITIONER

## 2021-07-06 PROCEDURE — 3046F HEMOGLOBIN A1C LEVEL >9.0%: CPT | Performed by: NURSE PRACTITIONER

## 2021-07-06 PROCEDURE — G8427 DOCREV CUR MEDS BY ELIG CLIN: HCPCS | Performed by: NURSE PRACTITIONER

## 2021-07-06 PROCEDURE — G8536 NO DOC ELDER MAL SCRN: HCPCS | Performed by: NURSE PRACTITIONER

## 2021-07-06 PROCEDURE — G8400 PT W/DXA NO RESULTS DOC: HCPCS | Performed by: NURSE PRACTITIONER

## 2021-07-06 RX ORDER — VARENICLINE TARTRATE 25 MG
KIT ORAL
Qty: 1 DOSE PACK | Refills: 0 | Status: SHIPPED
Start: 2021-07-06 | End: 2021-07-09

## 2021-07-06 RX ORDER — GABAPENTIN 800 MG/1
800 TABLET ORAL 3 TIMES DAILY
Qty: 90 TABLET | Refills: 5 | Status: SHIPPED | OUTPATIENT
Start: 2021-07-06 | End: 2022-01-11

## 2021-07-06 NOTE — PROGRESS NOTES
Aline Sanchez (: 1955) is a 72 y.o. female, established patient, here for evaluation of the following chief complaint(s):  Pre-op Exam (Cataracts ; 21 & 21)       ASSESSMENT/PLAN:  Below is the assessment and plan developed based on review of pertinent history, physical exam, labs, studies, and medications. 1. Preoperative general physical examination - PATIENT IS MEDICALLY CLEARED TO PROCEED WITH CATARACT SURGERY  2. Cataract of both eyes, unspecified cataract type  3. Type 2 diabetes mellitus with diabetic polyneuropathy, without long-term current use of insulin ([facility])  -      DIABETES FOOT EXAM  -     CBC WITH AUTOMATED DIFF; Future  -     METABOLIC PANEL, COMPREHENSIVE; Future  -     HEMOGLOBIN A1C WITH EAG; Future  -     MICROALBUMIN, UR, RAND W/ MICROALB/CREAT RATIO; Future  -     LIPID PANEL; Future  -     AMB POC URINALYSIS DIP STICK AUTO W/O MICRO  -     TSH 3RD GENERATION; Future  -     gabapentin (NEURONTIN) 800 mg tablet; Take 1 Tablet by mouth three (3) times daily. Max Daily Amount: 2,400 mg., Normal, Disp-90 Tablet, R-5  4. Essential hypertension  -     CBC WITH AUTOMATED DIFF; Future  -     METABOLIC PANEL, COMPREHENSIVE; Future  -     LIPID PANEL; Future  -     AMB POC URINALYSIS DIP STICK AUTO W/O MICRO  -     TSH 3RD GENERATION; Future  5. COPD (chronic obstructive pulmonary disease) with chronic bronchitis ([facility] [address]  6. Mild intermittent asthma without complication  7. Chronic bilateral low back pain without sciatica  8. Mixed hyperlipidemia  -     LIPID PANEL; Future  9. Tobacco abuse  -     varenicline (CHANTIX STARTER FILEMON) 0.5 mg (11)- 1 mg (42) DsPk; See pack instructions. , Normal, Disp-1 Dose Pack, R-0  10. Claudication of both lower extremities ([address]  -     ANKLE BRACHIAL INDEX; Future  11. Neck pain  -     XR SPINE CERV PA LAT ODONT 3 V MAX; Future  12.  Right foot pain  -     XR FOOT RT MIN 3 V; Future  PATIENT DECLINED REFERRAL FOR COLONOSCOPY, PNEUMONIA VACCINE, LOW DOSE LUNG CANCER SCREENING CT SCAN    Return in about 4 months (around 11/6/2021), or if symptoms worsen or fail to improve. SUBJECTIVE/OBJECTIVE:  HPI  Patient comes in today for preop exam for cataract surgery   Pre-op Exam       Cataracts ; 7/14/21 & 7/23/21   Cannot see to drive. Weight Metrics 7/6/2021 11/4/2020 12/13/2019 8/8/2019 3/22/2019 10/11/2018 7/26/2018   Weight 145 lb 3.2 oz 153 lb 158 lb 158 lb 9.6 oz 165 lb 3.2 oz 171 lb 9.6 oz 180 lb 12.8 oz   BMI 25.72 kg/m2 27.1 kg/m2 27.99 kg/m2 28.09 kg/m2 29.26 kg/m2 30.4 kg/m2 32.03 kg/m2     Last A1c 6.9%.  Patient was started on Janumet.  initial A1c 11.4% in March 2019. Monitoring BG when she feels weird, cannot remember what readings she gets. Denies any tingling sensation, polyuria and polydipsia. No blurred vision. Has lost a little weight. Does not eat much, does not like sweets. Does not eat like she used to. Feels tired all the time    Having low back and neck pain. Just feels like she aches all over. Will take Aleve or Advil with some relief. Using Biofreeze with some relief. Uses heat and massagers with no relief. Denies numbness or weakness in arms or legs. Does feel like she has trouble walking. Wearing lidocaine patches. Get muscle cramps in legs at night. Does not drink enough water. Complains of right great toe pain on plantar surface of foot. States she is taking gabapentin for her feet, complains of burning in bottom of feet      Finally saw grandkids after about 1 year. Hx GERD, taking prilosec. Patient refuses referral to GI at this time. States cholesterol medication causes her to have leg cramps at night. COPD on oxygen - wears when her oxygen reads low. Only has home concentrator. Trying to quit smoking. Smokes 1.5 ppd. No pulmonary doctor. Using albuterol twice daily. Uses symbicort twice daily. Did not like spiriva. Has not had any PFTs    Hx of breast cancer over 10 years ago .   Hx left lumpectomy. did radiation. Radiation at [address] Oncologist - Dr. Alma Delia Oconnell. Needs mammogram    No Known Allergies    Past Medical History:   Diagnosis Date    Acute bronchitis 4/7/2016    Atopic dermatitis 4/7/2016    Breast cancer (HCC)     Chronic obstructive pulmonary disease (HCC)     COPD (chronic obstructive pulmonary disease) with chronic bronchitis ([facility] [address] 7/26/2018    Essential hypertension 3/31/2016    Eye problems 3/31/2016    EVER (generalized anxiety disorder) 10/11/2018    Panic anxiety syndrome 10/11/2018    Tremor of face and hands 10/11/2018       Past Surgical History:   Procedure Laterality Date    HX BREAST LUMPECTOMY         Social History     Socioeconomic History    Marital status:      Spouse name: Not on file    Number of children: Not on file    Years of education: Not on file    Highest education level: Not on file   Occupational History    Not on file   Tobacco Use    Smoking status: Current Every Day Smoker     Packs/day: 1.00     Types: Cigarettes    Smokeless tobacco: Never Used   Vaping Use    Vaping Use: Never used   Substance and Sexual Activity    Alcohol use: No    Drug use: No    Sexual activity: Not Currently   Other Topics Concern    Not on file   Social History Narrative    Not on file     Social Determinants of Health     Financial Resource Strain:     Difficulty of Paying Living Expenses:    Food Insecurity:     Worried About Running Out of Food in the Last Year:     Ran Out of Food in the Last Year:    Transportation Needs:     Lack of Transportation (Medical):      Lack of Transportation (Non-Medical):    Physical Activity:     Days of Exercise per Week:     Minutes of Exercise per Session:    Stress:     Feeling of Stress :    Social Connections:     Frequency of Communication with Friends and Family:     Frequency of Social Gatherings with Friends and Family:     Attends Hindu Services:     Active Member of Clubs or Organizations:     Attends Club or Organization Meetings:     Marital Status:    Intimate Partner Violence:     Fear of Current or Ex-Partner:     Emotionally Abused:     Physically Abused:     Sexually Abused:        Family History   Problem Relation Age of Onset    Diabetes Father     Arthritis-osteo Sister     Diabetes Sister    Phan Israel Bladder Disease Sister        Current Outpatient Medications   Medication Sig    metoprolol succinate (TOPROL-XL) 25 mg XL tablet TAKE 1/2 (ONE-HALF)TABLET BY MOUTH DAILY    Symbicort 160-4.5 mcg/actuation HFAA INHALE TWO PUFFS BY MOUTH TWICE A DAY    Janumet  mg per tablet TAKE ONE TABLET BY MOUTH TWICE A DAY WITH MEALS    lidocaine (LIDODERM) 5 % APPLY 1 PATCH TO AFFECTED AREA FOR 12 HOURS IN A 24 HOUR PERIOD    albuterol-ipratropium (DUO-NEB) 2.5 mg-0.5 mg/3 ml nebu INHALE THREE MILLILITERS VIA NEBULIZATION BY MOUTH EVERY 4 HOURS AS NEEDED FOR WHEEZING    gabapentin (NEURONTIN) 600 mg tablet TAKE ONE TABLET BY MOUTH THREE TIMES A DAY    losartan-hydroCHLOROthiazide (HYZAAR) 100-25 mg per tablet TAKE ONE TABLET BY MOUTH DAILY    omeprazole (PRILOSEC) 20 mg capsule TAKE ONE CAPSULE BY MOUTH DAILY    LORazepam (ATIVAN) 0.5 mg tablet TAKE ONE TABLET BY MOUTH ONCE NIGHTLY AS NEEDED FOR SLEEP    albuterol (PROVENTIL HFA, VENTOLIN HFA, PROAIR HFA) 90 mcg/actuation inhaler INHALE TWO PUFFS BY MOUTH EVERY 4 HOURS AS NEEDED FOR WHEEZING AND SHORTNESS OF BREATH    cholecalciferol (VITAMIN D3) 1,000 unit tablet Take 1,000 Units by mouth daily.  Blood-Glucose Meter monitoring kit Monitor BG once daily and as needed  Dx: E11.42  Provide with glucometer that insurance covers.  lancets misc Monitor BG once daily and as needed  Dx: E11.42  Provide with glucometer/lancets that insurance covers.  glucose blood VI test strips (BLOOD GLUCOSE TEST) strip Monitor BG once daily and as needed  Dx: E11.42  Provide with glucometer/strips that insurance covers.     rosuvastatin (CRESTOR) 5 mg tablet TAKE ONE TABLET BY MOUTH ONCE NIGHTLY (Patient not taking: Reported on 11/4/2020)    varenicline (CHANTIX) 1 mg tablet Take 1 Tab by mouth two (2) times a day. (Patient not taking: Reported on 11/4/2020)     No current facility-administered medications for this visit. Review of Systems   Constitutional: Positive for fatigue and unexpected weight change. Negative for chills, diaphoresis and fever. Respiratory: Negative for cough and shortness of breath. Cardiovascular: Negative for chest pain, palpitations and leg swelling. Gastrointestinal: Negative for abdominal pain, blood in stool, constipation, diarrhea, nausea and vomiting. Endocrine: Negative for polydipsia, polyphagia and polyuria. Genitourinary: Negative for dysuria, flank pain, frequency, hematuria and urgency. Musculoskeletal: Positive for arthralgias, back pain, myalgias and neck pain. Skin: Negative. Neurological: Positive for dizziness (with position changes) and numbness (and pain in feet bilat). Negative for speech difficulty, light-headedness and headaches. Psychiatric/Behavioral: Negative for dysphoric mood and sleep disturbance. The patient is not nervous/anxious. Vitals:    07/06/21 1107 07/06/21 1110   BP: (!) 104/48 (!) 104/45   Pulse: 90    Resp: 12    Temp: 97.8 °F (36.6 °C)    TempSrc: Oral    SpO2: 98%    Weight: 145 lb 3.2 oz (65.9 kg)    Height: 5' 3\" (1.6 m)      Physical Exam  Vitals reviewed. Constitutional:       Appearance: Normal appearance. She is well-developed and well-groomed. HENT:      Right Ear: Hearing normal.      Left Ear: Hearing normal.   Neck:      Thyroid: No thyromegaly. Cardiovascular:      Rate and Rhythm: Normal rate and regular rhythm. Pulses:           Dorsalis pedis pulses are 2+ on the right side and 2+ on the left side. Heart sounds: S1 normal and S2 normal. Murmur heard. Systolic murmur is present.      Pulmonary:      Effort: Pulmonary effort is normal.      Breath sounds: Normal breath sounds. Abdominal:      General: Bowel sounds are normal.      Palpations: Abdomen is soft. Tenderness: There is no abdominal tenderness. Musculoskeletal:      Cervical back: No swelling, deformity, spasms, tenderness, bony tenderness or crepitus. Pain with movement present. Normal range of motion. Lumbar back: Normal. Negative right straight leg raise test and negative left straight leg raise test.      Right lower leg: No edema. Left lower leg: No edema. Right foot: Normal range of motion. No deformity, bunion or foot drop. Feet:      Right foot:      Protective Sensation: 4 sites tested. 4 sites sensed. Skin integrity: Skin integrity normal.      Toenail Condition: Right toenails are normal.      Left foot:      Protective Sensation: 4 sites tested. 4 sites sensed. Skin integrity: Skin integrity normal.      Toenail Condition: Left toenails are normal.      Comments: Diabetic foot exam:     Left: Intact sensation to monofilament 4/4 locations   Position sense intact   2+ DP pulse   No foot deformities  Right: Intact sensation to monofilament 4/4 locations   Position sense intact   2+ DP pulse   No foot deformities  Lymphadenopathy:      Cervical: No cervical adenopathy. Skin:     General: Skin is warm and dry. Neurological:      Mental Status: She is alert and oriented to person, place, and time. Psychiatric:         Attention and Perception: Attention normal.         Mood and Affect: Mood and affect normal.         Speech: Speech normal.         Behavior: Behavior normal. Behavior is cooperative. Thought Content:  Thought content normal.         Cognition and Memory: Cognition and memory normal.           On this date 07/06/2021 I have spent 40 minutes reviewing previous notes, test results and face to face with the patient discussing the diagnosis and importance of compliance with the treatment plan as well as documenting on the day of the visit. An electronic signature was used to authenticate this note.   -- Servando Mondragon NP

## 2021-07-06 NOTE — PATIENT INSTRUCTIONS
Neck: Exercises  Introduction  Here are some examples of exercises for you to try. The exercises may be suggested for a condition or for rehabilitation. Start each exercise slowly. Ease off the exercises if you start to have pain. You will be told when to start these exercises and which ones will work best for you. How to do the exercises  Neck stretch   1. This stretch works best if you keep your shoulder down as you lean away from it. To help you remember to do this, start by relaxing your shoulders and lightly holding on to your thighs or your chair. 2. Tilt your head toward your shoulder and hold for 15 to 30 seconds. Let the weight of your head stretch your muscles. 3. If you would like a little added stretch, use your hand to gently and steadily pull your head toward your shoulder. For example, keeping your right shoulder down, lean your head to the left. 4. Repeat 2 to 4 times toward each shoulder. Diagonal neck stretch   1. Turn your head slightly toward the direction you will be stretching, and tilt your head diagonally toward your chest and hold for 15 to 30 seconds. 2. If you would like a little added stretch, use your hand to gently and steadily pull your head forward on the diagonal.  3. Repeat 2 to 4 times toward each side. Dorsal glide stretch   The dorsal glide stretches the back of the neck. If you feel pain, do not glide so far back. Some people find this exercise easier to do while lying on their backs with an ice pack on the neck. 1. Sit or stand tall and look straight ahead. 2. Slowly tuck your chin as you glide your head backward over your body  3. Hold for a count of 6, and then relax for up to 10 seconds. 4. Repeat 8 to 12 times. Chest and shoulder stretch   1. Sit or stand tall and glide your head backward as in the dorsal glide stretch. 2. Raise both arms so that your hands are next to your ears.   3. Take a deep breath, and as you breathe out, lower your elbows down and behind your back. You will feel your shoulder blades slide down and together, and at the same time you will feel a stretch across your chest and the front of your shoulders. 4. Hold for about 6 seconds, and then relax for up to 10 seconds. 5. Repeat 8 to 12 times. Strengthening: Hands on head   1. Move your head backward, forward, and side to side against gentle pressure from your hands, holding each position for about 6 seconds. 2. Repeat 8 to 12 times. Follow-up care is a key part of your treatment and safety. Be sure to make and go to all appointments, and call your doctor if you are having problems. It's also a good idea to know your test results and keep a list of the medicines you take. Where can you learn more? Go to http://www.hare.com/  Enter P975 in the search box to learn more about \"Neck: Exercises. \"  Current as of: November 16, 2020               Content Version: 12.8  © 2006-2021 [employer]. Care instructions adapted under license by [employer] (which disclaims liability or warranty for this information). If you have questions about a medical condition or this instruction, always ask your healthcare professional. [address] any warranty or liability for your use of this information.

## 2021-07-06 NOTE — LETTER
7/6/2021    Ms. Arturo Stephenson [id number]      Dear Jessica Contreras:    Please find your most recent results below. Resulted Orders   XR SPINE CERV PA LAT ODONT 3 V MAX    Narrative    EXAM: XR SPINE CERV PA LAT ODONT 3 V MAX    INDICATION: Neck pain    COMPARISON: None. FINDINGS: AP and lateral views of the cervical spine were obtained. The  alignment is remarkable for straightening. The vertebral body heights are  well-preserved. There are spondylitic changes at C5-6 and C6-7, and to a lesser  degree C4-5. There is no fracture or subluxation. The prevertebral soft tissues  are normal.       Impression    Spondylitic changes   XR FOOT RT MIN 3 V    Narrative    EXAM: XR FOOT RT MIN 3 V    INDICATION: RIGHT FOOT PAIN. COMPARISON: None. FINDINGS: Three views of the right foot demonstrate no fracture or other acute  osseous or articular abnormality. There is a moderate plantar spur without  blurring of the plantar fascia. Impression    Moderate plantar spur. RECOMMENDATIONS:  Neck xray shows arthritic changes. Foot xray normal except for small heel spur.     Please call me if you have any questions: [phone]  Sincerely,      Hung Luis NP

## 2021-07-06 NOTE — PROGRESS NOTES
Chief Complaint   Patient presents with    Pre-op Exam     Cataracts ; 7/14/21 & 7/23/21       1. Have you been to the ER, urgent care clinic since your last visit? Hospitalized since your last visit? No    2. Have you seen or consulted any other health care providers outside of the [address] [place] [address] since your last visit? Include any pap smears or colon screening.  No    Health Maintenance Due   Topic Date Due    Eye Exam Retinal or Dilated  Never done    Shingrix Vaccine Age 50> (1 of 2) Never done    Colorectal Cancer Screening Combo  Never done    Breast Cancer Screen Mammogram  01/17/2013    MICROALBUMIN Q1  03/22/2020    Foot Exam Q1  08/08/2020    Lipid Screen  08/08/2020    A1C test (Diabetic or Prediabetic)  12/13/2020    Bone Densitometry (Dexa) Screening  Never done    Pneumococcal 65+ years (1 of 1 - PPSV23) Never done    Medicare Yearly Exam  Never done

## 2021-07-07 ENCOUNTER — HOSPITAL ENCOUNTER (INPATIENT)
Age: 66
LOS: 1 days | Discharge: HOME | DRG: 378 | End: 2021-07-09
Attending: STUDENT IN AN ORGANIZED HEALTH CARE EDUCATION/TRAINING PROGRAM | Admitting: INTERNAL MEDICINE
Payer: MEDICARE

## 2021-07-07 ENCOUNTER — TELEPHONE (OUTPATIENT)
Dept: FAMILY MEDICINE CLINIC | Age: 66
End: 2021-07-07

## 2021-07-07 DIAGNOSIS — D64.9 SYMPTOMATIC ANEMIA: Primary | ICD-10-CM

## 2021-07-07 LAB
ALBUMIN SERPL-MCNC: 3 G/DL (ref 3.5–5)
ALBUMIN/GLOB SERPL: 0.7 {RATIO} (ref 1.1–2.2)
ALP SERPL-CCNC: 124 U/L (ref 45–117)
ALT SERPL-CCNC: 11 U/L (ref 12–78)
ANION GAP SERPL CALC-SCNC: 6 MMOL/L (ref 5–15)
AST SERPL-CCNC: 12 U/L (ref 15–37)
BASOPHILS # BLD: 0 K/UL (ref 0–0.1)
BASOPHILS # BLD: 0.1 K/UL (ref 0–0.1)
BASOPHILS NFR BLD: 0 % (ref 0–1)
BASOPHILS NFR BLD: 1 % (ref 0–1)
BILIRUB SERPL-MCNC: 0.5 MG/DL (ref 0.2–1)
BUN SERPL-MCNC: 12 MG/DL (ref 6–20)
BUN/CREAT SERPL: 12 (ref 12–20)
CALCIUM SERPL-MCNC: 9.2 MG/DL (ref 8.5–10.1)
CHLORIDE SERPL-SCNC: 102 MMOL/L (ref 97–108)
CO2 SERPL-SCNC: 28 MMOL/L (ref 21–32)
COMMENT, HOLDF: NORMAL
COMMENT, HOLDF: NORMAL
CREAT SERPL-MCNC: 1.03 MG/DL (ref 0.55–1.02)
DIFFERENTIAL METHOD BLD: ABNORMAL
DIFFERENTIAL METHOD BLD: ABNORMAL
EOSINOPHIL # BLD: 0.1 K/UL (ref 0–0.4)
EOSINOPHIL # BLD: 0.3 K/UL (ref 0–0.4)
EOSINOPHIL NFR BLD: 1 % (ref 0–7)
EOSINOPHIL NFR BLD: 3 % (ref 0–7)
ERYTHROCYTE [DISTWIDTH] IN BLOOD BY AUTOMATED COUNT: 21 % (ref 11.5–14.5)
ERYTHROCYTE [DISTWIDTH] IN BLOOD BY AUTOMATED COUNT: 21.7 % (ref 11.5–14.5)
FERRITIN SERPL-MCNC: 4 NG/ML (ref 26–388)
FOLATE SERPL-MCNC: 7.5 NG/ML (ref 5–21)
GLOBULIN SER CALC-MCNC: 4.4 G/DL (ref 2–4)
GLUCOSE SERPL-MCNC: 137 MG/DL (ref 65–100)
HCT VFR BLD AUTO: 21.6 % (ref 35–47)
HCT VFR BLD AUTO: 22.9 % (ref 35–47)
HCT VFR BLD AUTO: 23 % (ref 35–47)
HEMOCCULT STL QL: NEGATIVE
HGB BLD-MCNC: 5.6 G/DL (ref 11.5–16)
HGB BLD-MCNC: 5.8 G/DL (ref 11.5–16)
HGB BLD-MCNC: 6.6 G/DL (ref 11.5–16)
HISTORY CHECKED?,CKHIST: NORMAL
IMM GRANULOCYTES # BLD AUTO: 0 K/UL
IMM GRANULOCYTES # BLD AUTO: 0.1 K/UL (ref 0–0.04)
IMM GRANULOCYTES NFR BLD AUTO: 0 %
IMM GRANULOCYTES NFR BLD AUTO: 1 % (ref 0–0.5)
IRON SATN MFR SERPL: 4 % (ref 20–50)
IRON SERPL-MCNC: 18 UG/DL (ref 35–150)
LDH SERPL L TO P-CCNC: 190 U/L (ref 81–246)
LYMPHOCYTES # BLD: 1.2 K/UL (ref 0.8–3.5)
LYMPHOCYTES # BLD: 1.2 K/UL (ref 0.8–3.5)
LYMPHOCYTES NFR BLD: 11 % (ref 12–49)
LYMPHOCYTES NFR BLD: 12 % (ref 12–49)
MCH RBC QN AUTO: 17.4 PG (ref 26–34)
MCH RBC QN AUTO: 17.4 PG (ref 26–34)
MCHC RBC AUTO-ENTMCNC: 25.3 G/DL (ref 30–36.5)
MCHC RBC AUTO-ENTMCNC: 25.9 G/DL (ref 30–36.5)
MCV RBC AUTO: 67.3 FL (ref 80–99)
MCV RBC AUTO: 68.6 FL (ref 80–99)
MONOCYTES # BLD: 0.4 K/UL (ref 0–1)
MONOCYTES # BLD: 0.7 K/UL (ref 0–1)
MONOCYTES NFR BLD: 4 % (ref 5–13)
MONOCYTES NFR BLD: 7 % (ref 5–13)
NEUTS SEG # BLD: 7.7 K/UL (ref 1.8–8)
NEUTS SEG # BLD: 8.9 K/UL (ref 1.8–8)
NEUTS SEG NFR BLD: 76 % (ref 32–75)
NEUTS SEG NFR BLD: 84 % (ref 32–75)
NRBC # BLD: 0 K/UL (ref 0–0.01)
NRBC # BLD: 0 K/UL (ref 0–0.01)
NRBC BLD-RTO: 0 PER 100 WBC
NRBC BLD-RTO: 0 PER 100 WBC
PLATELET # BLD AUTO: 487 K/UL (ref 150–400)
PLATELET # BLD AUTO: 524 K/UL (ref 150–400)
PMV BLD AUTO: 10.1 FL (ref 8.9–12.9)
PMV BLD AUTO: 10.3 FL (ref 8.9–12.9)
POTASSIUM SERPL-SCNC: 3.5 MMOL/L (ref 3.5–5.1)
PROT SERPL-MCNC: 7.4 G/DL (ref 6.4–8.2)
RBC # BLD AUTO: 3.21 M/UL (ref 3.8–5.2)
RBC # BLD AUTO: 3.34 M/UL (ref 3.8–5.2)
RBC MORPH BLD: ABNORMAL
SAMPLES BEING HELD,HOLD: NORMAL
SAMPLES BEING HELD,HOLD: NORMAL
SODIUM SERPL-SCNC: 136 MMOL/L (ref 136–145)
TIBC SERPL-MCNC: 436 UG/DL (ref 250–450)
UR CULT HOLD, URHOLD: NORMAL
VIT B12 SERPL-MCNC: 405 PG/ML (ref 193–986)
WBC # BLD AUTO: 10.1 K/UL (ref 3.6–11)
WBC # BLD AUTO: 10.6 K/UL (ref 3.6–11)

## 2021-07-07 PROCEDURE — 99218 HC RM OBSERVATION: CPT

## 2021-07-07 PROCEDURE — 80053 COMPREHEN METABOLIC PANEL: CPT

## 2021-07-07 PROCEDURE — P9016 RBC LEUKOCYTES REDUCED: HCPCS

## 2021-07-07 PROCEDURE — 85014 HEMATOCRIT: CPT

## 2021-07-07 PROCEDURE — 74011000250 HC RX REV CODE- 250: Performed by: INTERNAL MEDICINE

## 2021-07-07 PROCEDURE — 74011250637 HC RX REV CODE- 250/637: Performed by: NURSE PRACTITIONER

## 2021-07-07 PROCEDURE — 36415 COLL VENOUS BLD VENIPUNCTURE: CPT

## 2021-07-07 PROCEDURE — 85025 COMPLETE CBC W/AUTO DIFF WBC: CPT

## 2021-07-07 PROCEDURE — 74011250637 HC RX REV CODE- 250/637: Performed by: INTERNAL MEDICINE

## 2021-07-07 PROCEDURE — 82746 ASSAY OF FOLIC ACID SERUM: CPT

## 2021-07-07 PROCEDURE — 82728 ASSAY OF FERRITIN: CPT

## 2021-07-07 PROCEDURE — 99285 EMERGENCY DEPT VISIT HI MDM: CPT

## 2021-07-07 PROCEDURE — 36430 TRANSFUSION BLD/BLD COMPNT: CPT

## 2021-07-07 PROCEDURE — 94640 AIRWAY INHALATION TREATMENT: CPT

## 2021-07-07 PROCEDURE — 86901 BLOOD TYPING SEROLOGIC RH(D): CPT

## 2021-07-07 PROCEDURE — 86920 COMPATIBILITY TEST SPIN: CPT

## 2021-07-07 PROCEDURE — 83615 LACTATE (LD) (LDH) ENZYME: CPT

## 2021-07-07 PROCEDURE — 82607 VITAMIN B-12: CPT

## 2021-07-07 PROCEDURE — 30233N1 TRANSFUSION OF NONAUTOLOGOUS RED BLOOD CELLS INTO PERIPHERAL VEIN, PERCUTANEOUS APPROACH: ICD-10-PCS | Performed by: INTERNAL MEDICINE

## 2021-07-07 PROCEDURE — 82272 OCCULT BLD FECES 1-3 TESTS: CPT

## 2021-07-07 PROCEDURE — 83540 ASSAY OF IRON: CPT

## 2021-07-07 RX ORDER — SODIUM CHLORIDE 0.9 % (FLUSH) 0.9 %
5-40 SYRINGE (ML) INJECTION AS NEEDED
Status: DISCONTINUED | OUTPATIENT
Start: 2021-07-07 | End: 2021-07-10 | Stop reason: HOSPADM

## 2021-07-07 RX ORDER — GABAPENTIN 400 MG/1
800 CAPSULE ORAL 3 TIMES DAILY
Status: DISCONTINUED | OUTPATIENT
Start: 2021-07-08 | End: 2021-07-10 | Stop reason: HOSPADM

## 2021-07-07 RX ORDER — LIDOCAINE 4 G/100G
1 PATCH TOPICAL EVERY 24 HOURS
Status: DISCONTINUED | OUTPATIENT
Start: 2021-07-07 | End: 2021-07-10 | Stop reason: HOSPADM

## 2021-07-07 RX ORDER — METOPROLOL SUCCINATE 25 MG/1
25 TABLET, EXTENDED RELEASE ORAL DAILY
Status: DISCONTINUED | OUTPATIENT
Start: 2021-07-08 | End: 2021-07-08

## 2021-07-07 RX ORDER — GABAPENTIN 300 MG/1
600 CAPSULE ORAL ONCE
Status: COMPLETED | OUTPATIENT
Start: 2021-07-07 | End: 2021-07-07

## 2021-07-07 RX ORDER — LORAZEPAM 0.5 MG/1
0.5 TABLET ORAL
Status: DISCONTINUED | OUTPATIENT
Start: 2021-07-07 | End: 2021-07-10 | Stop reason: HOSPADM

## 2021-07-07 RX ORDER — ONDANSETRON 4 MG/1
4 TABLET, ORALLY DISINTEGRATING ORAL
Status: DISCONTINUED | OUTPATIENT
Start: 2021-07-07 | End: 2021-07-10 | Stop reason: HOSPADM

## 2021-07-07 RX ORDER — SODIUM CHLORIDE 9 MG/ML
250 INJECTION, SOLUTION INTRAVENOUS AS NEEDED
Status: DISCONTINUED | OUTPATIENT
Start: 2021-07-07 | End: 2021-07-10 | Stop reason: HOSPADM

## 2021-07-07 RX ORDER — MELATONIN
1000 DAILY
Status: DISCONTINUED | OUTPATIENT
Start: 2021-07-08 | End: 2021-07-10 | Stop reason: HOSPADM

## 2021-07-07 RX ORDER — ONDANSETRON 2 MG/ML
4 INJECTION INTRAMUSCULAR; INTRAVENOUS
Status: DISCONTINUED | OUTPATIENT
Start: 2021-07-07 | End: 2021-07-10 | Stop reason: HOSPADM

## 2021-07-07 RX ORDER — PANTOPRAZOLE SODIUM 40 MG/1
40 TABLET, DELAYED RELEASE ORAL
Status: DISCONTINUED | OUTPATIENT
Start: 2021-07-08 | End: 2021-07-10 | Stop reason: HOSPADM

## 2021-07-07 RX ORDER — POLYETHYLENE GLYCOL 3350 17 G/17G
17 POWDER, FOR SOLUTION ORAL DAILY PRN
Status: DISCONTINUED | OUTPATIENT
Start: 2021-07-07 | End: 2021-07-10 | Stop reason: HOSPADM

## 2021-07-07 RX ORDER — IBUPROFEN 200 MG
1 TABLET ORAL EVERY 24 HOURS
Status: DISCONTINUED | OUTPATIENT
Start: 2021-07-07 | End: 2021-07-10 | Stop reason: HOSPADM

## 2021-07-07 RX ORDER — SODIUM CHLORIDE 0.9 % (FLUSH) 0.9 %
5-40 SYRINGE (ML) INJECTION EVERY 8 HOURS
Status: DISCONTINUED | OUTPATIENT
Start: 2021-07-07 | End: 2021-07-10 | Stop reason: HOSPADM

## 2021-07-07 RX ORDER — ACETAMINOPHEN 650 MG/1
650 SUPPOSITORY RECTAL
Status: DISCONTINUED | OUTPATIENT
Start: 2021-07-07 | End: 2021-07-10 | Stop reason: HOSPADM

## 2021-07-07 RX ORDER — ALBUTEROL SULFATE 0.83 MG/ML
2.5 SOLUTION RESPIRATORY (INHALATION)
Status: DISCONTINUED | OUTPATIENT
Start: 2021-07-07 | End: 2021-07-09

## 2021-07-07 RX ORDER — ACETAMINOPHEN 325 MG/1
650 TABLET ORAL
Status: DISCONTINUED | OUTPATIENT
Start: 2021-07-07 | End: 2021-07-10 | Stop reason: HOSPADM

## 2021-07-07 RX ORDER — IBUPROFEN 200 MG
1 TABLET ORAL DAILY
Status: DISCONTINUED | OUTPATIENT
Start: 2021-07-08 | End: 2021-07-07

## 2021-07-07 RX ADMIN — ACETAMINOPHEN 650 MG: 325 TABLET ORAL at 18:09

## 2021-07-07 RX ADMIN — GABAPENTIN 600 MG: 300 CAPSULE ORAL at 22:18

## 2021-07-07 RX ADMIN — ARFORMOTEROL TARTRATE: 15 SOLUTION RESPIRATORY (INHALATION) at 18:02

## 2021-07-07 RX ADMIN — Medication 10 ML: at 18:01

## 2021-07-07 RX ADMIN — Medication 10 ML: at 22:18

## 2021-07-07 NOTE — ED PROVIDER NOTES
Chief Complaint   Patient presents with    Abnormal Lab Results     This is a 27-year-old female with a remote history of breast cancer status post lumpectomy, COPD, hypertension, referred here after preoperative lab work for anticipated cataract surgery indicated a critically low hemoglobin. She was called today and told to come in to be evaluated further, she says that she did require a blood transfusion when she had dysfunctional uterine bleeding and never required a transfusion again after she had her hysterectomy. She has never had a colonoscopy performed, denies any associated vomiting, melanotic stools, or bright red blood per rectum. No abdominal pain, chest pain, shortness of breath, or fevers or recent illness. She is not sure if she has iron deficiency anemia but acknowledges that she receives poor nutrition in general.  She feels very weak and tired but denies any recent syncope, is not dyspneic. No other systemic complaints. Presentation is severe in nature without any alleviating factors, fatigue is worse with exertion.       Past Medical History:   Diagnosis Date    Acute bronchitis 4/7/2016    Atopic dermatitis 4/7/2016    Breast cancer (HCC)     Chronic obstructive pulmonary disease (HCC)     COPD (chronic obstructive pulmonary disease) with chronic bronchitis ([facility] [address] 7/26/2018    Essential hypertension 3/31/2016    Eye problems 3/31/2016    EVER (generalized anxiety disorder) 10/11/2018    Panic anxiety syndrome 10/11/2018    Tremor of face and hands 10/11/2018       Past Surgical History:   Procedure Laterality Date    HX BREAST LUMPECTOMY      HX HYSTERECTOMY           Family History:   Problem Relation Age of Onset    Diabetes Father     Arthritis-osteo Sister     Diabetes Sister    Alcon Stevenson Bladder Disease Sister        Social History     Socioeconomic History    Marital status:      Spouse name: Not on file    Number of children: Not on file    Years of education: Not on file    Highest education level: Not on file   Occupational History    Not on file   Tobacco Use    Smoking status: Current Every Day Smoker     Packs/day: 1.00     Types: Cigarettes    Smokeless tobacco: Never Used   Vaping Use    Vaping Use: Never used   Substance and Sexual Activity    Alcohol use: No    Drug use: No    Sexual activity: Not Currently   Other Topics Concern    Not on file   Social History Narrative    Not on file     Social Determinants of Health     Financial Resource Strain:     Difficulty of Paying Living Expenses:    Food Insecurity:     Worried About Running Out of Food in the Last Year:     Ran Out of Food in the Last Year:    Transportation Needs:     Lack of Transportation (Medical):  Lack of Transportation (Non-Medical):    Physical Activity:     Days of Exercise per Week:     Minutes of Exercise per Session:    Stress:     Feeling of Stress :    Social Connections:     Frequency of Communication with Friends and Family:     Frequency of Social Gatherings with Friends and Family:     Attends Sabianism Services:     Active Member of Clubs or Organizations:     Attends Club or Organization Meetings:     Marital Status:    Intimate Partner Violence:     Fear of Current or Ex-Partner:     Emotionally Abused:     Physically Abused:     Sexually Abused: ALLERGIES: Patient has no known allergies. Review of Systems   Constitutional: Positive for fatigue. Negative for fever. HENT: Negative for facial swelling. Eyes: Negative for redness. Respiratory: Negative for shortness of breath. Cardiovascular: Negative for chest pain. Gastrointestinal: Negative for abdominal pain, blood in stool and vomiting. Genitourinary: Negative for difficulty urinating. Musculoskeletal: Negative for gait problem. Neurological: Positive for weakness. Psychiatric/Behavioral: Negative for confusion.        Vitals:    07/07/21 1112   BP: 130/67   Pulse: 94 Resp: 18   Temp: 98.2 °F (36.8 °C)   SpO2: 97%   Weight: 65.8 kg (145 lb)   Height: 5' 3\" (1.6 m)            Physical Exam  General:  Awake and alert, NAD  HEENT:  NC/AT, equal pupils, pale conjunctivae  Neck:   Normal inspection, full range of motion  Cardiac:  RRR, no murmurs  Respiratory:  Clear bilaterally, no wheezes, rales, rhonchi  Abdomen:  Soft and nontender, nondistended  Extremities: Warm and well perfused, no peripheral edema  Neuro:  Moving all extremities symmetrically without gross motor deficit  Skin:   Slight diffuse pallor  Rectal:  Normal external inspection, no stool sample could be obtained so limited hemoccult test    RESULTS  Recent Results (from the past 12 hour(s))   CBC WITH AUTOMATED DIFF    Collection Time: 07/07/21 11:21 AM   Result Value Ref Range    WBC 10.1 3.6 - 11.0 K/uL    RBC 3.21 (L) 3.80 - 5.20 M/uL    HGB 5.6 (LL) 11.5 - 16.0 g/dL    HCT 21.6 (L) 35.0 - 47.0 %    MCV 67.3 (L) 80.0 - 99.0 FL    MCH 17.4 (L) 26.0 - 34.0 PG    MCHC 25.9 (L) 30.0 - 36.5 g/dL    RDW 21.0 (H) 11.5 - 14.5 %    PLATELET 385 (H) 011 - 400 K/uL    MPV 10.1 8.9 - 12.9 FL    NRBC 0.0 0  WBC    ABSOLUTE NRBC 0.00 0.00 - 0.01 K/uL    NEUTROPHILS PENDING %    LYMPHOCYTES PENDING %    MONOCYTES PENDING %    EOSINOPHILS PENDING %    BASOPHILS PENDING %    IMMATURE GRANULOCYTES PENDING %    ABS. NEUTROPHILS PENDING K/UL    ABS. LYMPHOCYTES PENDING K/UL    ABS. MONOCYTES PENDING K/UL    ABS. EOSINOPHILS PENDING K/UL    ABS. BASOPHILS PENDING K/UL    ABS. IMM. GRANS.  PENDING K/UL    DF PENDING    METABOLIC PANEL, COMPREHENSIVE    Collection Time: 07/07/21 11:21 AM   Result Value Ref Range    Sodium 136 136 - 145 mmol/L    Potassium 3.5 3.5 - 5.1 mmol/L    Chloride 102 97 - 108 mmol/L    CO2 28 21 - 32 mmol/L    Anion gap 6 5 - 15 mmol/L    Glucose 137 (H) 65 - 100 mg/dL    BUN 12 6 - 20 MG/DL    Creatinine 1.03 (H) 0.55 - 1.02 MG/DL    BUN/Creatinine ratio 12 12 - 20      GFR est AA >60 >60 ml/min/1.73m2 GFR est non-AA 54 (L) >60 ml/min/1.73m2    Calcium 9.2 8.5 - 10.1 MG/DL    Bilirubin, total 0.5 0.2 - 1.0 MG/DL    ALT (SGPT) 11 (L) 12 - 78 U/L    AST (SGOT) 12 (L) 15 - 37 U/L    Alk. phosphatase 124 (H) 45 - 117 U/L    Protein, total 7.4 6.4 - 8.2 g/dL    Albumin 3.0 (L) 3.5 - 5.0 g/dL    Globulin 4.4 (H) 2.0 - 4.0 g/dL    A-G Ratio 0.7 (L) 1.1 - 2.2     SAMPLES BEING HELD    Collection Time: 07/07/21 11:21 AM   Result Value Ref Range    SAMPLES BEING HELD 1RED     COMMENT        Add-on orders for these samples will be processed based on acceptable specimen integrity and analyte stability, which may vary by analyte. IMAGING  No results found. Procedures - none unless documented below    ED course: Labs reviewed. Sent here for abnormal preoperative outpatient CBC yesterday, hemoglobin of 5.6 here (over 6 gram drop from 2 years ago). I could not get much of a sample on her rectal exam so would not rely on hemoccult testing, needs evaluation by GI. She denies any history of EGD/colonoscopy, has no abdominal pain. Possible iron deficiency. Ordered 2 units pRBC's for transfusion, needs serial H&H and possible endoscopic evaluation, discussed with the hospitalist.      [address] for Admission  1:38 PM    ED Room Number:   ER20/20  Patient Name and age:  Ulyess Prey 72 y.o.  female  Working Diagnosis:     1. Symptomatic anemia      COVID suspicion:   no  Code Status:    Full Code  Readmission:    no  Isolation Requirements:  no  Recommended Level of Care: telemetry  Department:    [address] [place] [address] Adult ED - 21   Other:     Sent here for abnormal preoperative outpatient CBC yesterday, hemoglobin of 5.6 here (over 6 gram drop from 2 years ago). I could not get much of a sample on her rectal exam so would not rely on hemoccult testing, needs evaluation by GI. She denies any history of EGD/colonoscopy, has no abdominal pain. Ordered 2 units pRBC's for transfusion.

## 2021-07-07 NOTE — ED NOTES
1820 TRANSFER - OUT REPORT:    Verbal report given to BORIS Chi(name) on Micaela Drop  being transferred to  536(unit) for routine progression of care       Report consisted of patients Situation, Background, Assessment and   Recommendations(SBAR). Information from the following report(s) SBAR, Kardex, ED Summary, Intake/Output, MAR, Recent Results and Med Rec Status was reviewed with the receiving nurse. Lines:   Peripheral IV 07/07/21 Posterior;Right Forearm (Active)       Peripheral IV 07/07/21 Anterior;Right Wrist (Active)        Opportunity for questions and clarification was provided.

## 2021-07-07 NOTE — TELEPHONE ENCOUNTER
Spoke with pt and informed of low hemoglobin and instructed to go to ED for eval per Yudy Garrison NP. Pt verbalized understanding.

## 2021-07-07 NOTE — PROGRESS NOTES
Spoke to Dr. Katerina Canales about pt's consult. States he will see her in the morning, will pass along to nightshift RN. Verbal report received from Ana Villalba, ED RN, at [phone].  Awaiting pt's arrival to the unit

## 2021-07-07 NOTE — ED TRIAGE NOTES
Pt reports she had lab work done yesterday in prep for her cataract surgery and was called today about her H&H being low. Pt denies dark or bloody stools. Pt does report increased fatigue. Pt has hx of COPD.

## 2021-07-07 NOTE — PROGRESS NOTES
Call patient and send to ED for low hemoglobin 5.8    A1c 6.8% - good control of diabetes    LDL is elevated. See if patient taking Crestor daily.

## 2021-07-07 NOTE — H&P
[facility]  History and Physical    Primary Care Provider: Frandy Bess NP  Date of Service:  7/7/2021    Subjective:     Micaela Fagan is a 72 y.o. female with pmh of cataracts, COPD, HTN,NIDDM who presents with anemia found incidentally pre op. Planned to undergo cataract surgery. Patient went into see her PCP, had preop labs drawn and found to have significant anemia with hemoglobin of 5.8. Patient states has been a while since she had last seen her primary care physician due to the COVID-19 pandemic. She denies any symptoms acutely, but on further interview states that she has been more lethargic, fatigued, and has some dyspnea on exertion. She denies any chest pain. No obvious blood in the stool. She denies any uterine bleeding. She states that she typically has decreased p.o. intake at home. She has never had a colonoscopy or endoscopy. Review of Systems:    A comprehensive review of systems was negative except for that written in the History of Present Illness. Past Medical History:   Diagnosis Date    Acute bronchitis 4/7/2016    Atopic dermatitis 4/7/2016    Breast cancer (HCC)     Chronic obstructive pulmonary disease (HCC)     COPD (chronic obstructive pulmonary disease) with chronic bronchitis ([facility] [address] 7/26/2018    Essential hypertension 3/31/2016    Eye problems 3/31/2016    EVER (generalized anxiety disorder) 10/11/2018    Panic anxiety syndrome 10/11/2018    Tremor of face and hands 10/11/2018      Past Surgical History:   Procedure Laterality Date    HX BREAST LUMPECTOMY      HX HYSTERECTOMY       Prior to Admission medications    Medication Sig Start Date End Date Taking? Authorizing Provider   varenicline (CHANTIX STARTER FILEMON) 0.5 mg (11)- 1 mg (42) DsPk See pack instructions. 7/6/21   Marc Mcgregor NP   gabapentin (NEURONTIN) 800 mg tablet Take 1 Tablet by mouth three (3) times daily.  Max Daily Amount: 2,400 mg. 7/6/21 Rosendo Morales NP   metoprolol succinate (TOPROL-XL) 25 mg XL tablet TAKE 1/2 (ONE-HALF)TABLET BY MOUTH DAILY 6/1/21   Debora Mcgregor NP   Symbicort 160-4.5 mcg/actuation HFAA INHALE TWO PUFFS BY MOUTH TWICE A DAY 5/3/21   Debora Mcgregor NP   Janumet  mg per tablet TAKE ONE TABLET BY MOUTH TWICE A DAY WITH MEALS 3/31/21   Erendira Mcgregor NP   lidocaine (LIDODERM) 5 % APPLY 1 PATCH TO AFFECTED AREA FOR 12 HOURS IN A 24 HOUR PERIOD 3/16/21   Debora Mcgregor NP   albuterol-ipratropium (DUO-NEB) 2.5 mg-0.5 mg/3 ml nebu INHALE THREE MILLILITERS VIA NEBULIZATION BY MOUTH EVERY 4 HOURS AS NEEDED FOR WHEEZING 3/16/21   Debora Mcgregor NP   losartan-hydroCHLOROthiazide (HYZAAR) 100-25 mg per tablet TAKE ONE TABLET BY MOUTH DAILY 2/1/21   Debora Mcgregor NP   omeprazole (PRILOSEC) 20 mg capsule TAKE ONE CAPSULE BY MOUTH DAILY 12/1/20   Debora Mcgregor NP   LORazepam (ATIVAN) 0.5 mg tablet TAKE ONE TABLET BY MOUTH ONCE NIGHTLY AS NEEDED FOR SLEEP 9/8/20   Debora Mcgregor NP   rosuvastatin (CRESTOR) 5 mg tablet TAKE ONE TABLET BY MOUTH ONCE NIGHTLY  Patient not taking: Reported on 11/4/2020 8/3/20   Debora Mcgregor NP   albuterol (PROVENTIL HFA, VENTOLIN HFA, PROAIR HFA) 90 mcg/actuation inhaler INHALE TWO PUFFS BY MOUTH EVERY 4 HOURS AS NEEDED FOR WHEEZING AND SHORTNESS OF BREATH 7/2/20   Debora Mcgregor NP   varenicline (CHANTIX) 1 mg tablet Take 1 Tab by mouth two (2) times a day. Patient not taking: Reported on 11/4/2020 9/10/19   Rosendo Morales NP   cholecalciferol (VITAMIN D3) 1,000 unit tablet Take 1,000 Units by mouth daily. Provider, Historical   Blood-Glucose Meter monitoring kit Monitor BG once daily and as needed  Dx: E11.42  Provide with glucometer that insurance covers. 3/22/19   Rosendo Morales NP   lancets misc Monitor BG once daily and as needed  Dx: E11.42  Provide with glucometer/lancets that insurance covers.  3/22/19   Debora Mcgregor NP   glucose blood VI test strips (BLOOD GLUCOSE TEST) strip Monitor BG once daily and as needed  Dx: E11.42  Provide with glucometer/strips that insurance covers. 3/22/19   Andrea Mcgregor NP     No Known Allergies   Family History   Problem Relation Age of Onset    Diabetes Father     Arthritis-osteo Sister     Diabetes Sister    Latanya Major Bladder Disease Sister         SOCIAL HISTORY:  Patient resides at Home. Patient ambulates without assistance. Smoking history: 1 ppd  Alcohol history: Denies         Objective:       Physical Exam:   Visit Vitals  /66   Pulse 76   Temp 98.2 °F (36.8 °C)   Resp 23   Ht 5' 3\" (1.6 m)   Wt 65.8 kg (145 lb)   SpO2 99%   BMI 25.69 kg/m²     General appearance: alert, cooperative, no distress, appears stated age  Head: Normocephalic, without obvious abnormality, atraumatic, pale conjunctiva healthcare provider more importantly  Eyes: conjunctivae/corneas clear. PERRL, EOM's intact. Lungs: clear to auscultation bilaterally  Heart: regular rate and rhythm, S1, S2 normal, no murmur, click, rub or gallop  Abdomen: soft, non-tender. Bowel sounds normal. No masses,  no organomegaly  Extremities: extremities normal, atraumatic, no cyanosis or edema  Pulses: 2+ and symmetric  Skin: Skin color, texture, turgor normal. No rashes or lesions  Neurologic: Grossly normal  Cap refill: Brisk, less than 3 seconds  Pulses: 2+, symmetric in all extremities  Skin: Warm, dry, without rashes or lesions      Data Review: All diagnostic labs and studies have been reviewed. Cervical spine x-ray  IMPRESSION  Spondylitic changes    Assessment:     Active Problems:    Anemia (7/7/2021)        Plan:     Acute on chronic anemia, likely iron deficiency given low MCV  - Agree with PRBC transfusion,1 unit and then recheck hemoglobin  - Transfuse for hemoglobin goal of 7   - Patient will need EGD and colonoscopy, she would prefer to do this outpatient if possible.   - Check ferritin, LDH, iron panel, ferritin, B12, folate   - GI consult, and NPO past midnight     NIDDM - A1c pending   - SSI, POCT glucose checks and hypoglycemia protocol  - A1c will be falsely low given severe anemia and already received PRBC transfusion   - Hold oral hypoglycemics     Cataracts - likely can still undergo surgery later this month as long as hemoglobin remains stable     COPD - home inhalers, and PRN albuterol. Not in exacerbation currently.  O2 if needed for Sa greater than 88%  HTN - hold antihypertensives, BP low and in setting of anemia   Nicotine dependence - nicotine patch     FULL CODE  DVT Ppx - SCDs given severe anemia    FUNCTIONAL STATUS PRIOR TO HOSPITALIZATION Ambulates Independently     Signed By: Romario Vazquez MD     July 7, 2021

## 2021-07-08 LAB
ABO + RH BLD: NORMAL
BLD PROD TYP BPU: NORMAL
BLD PROD TYP BPU: NORMAL
BLOOD GROUP ANTIBODIES SERPL: NORMAL
BPU ID: NORMAL
BPU ID: NORMAL
CROSSMATCH RESULT,%XM: NORMAL
CROSSMATCH RESULT,%XM: NORMAL
HGB BLD-MCNC: 7.4 G/DL (ref 11.5–16)
HGB BLD-MCNC: 8.2 G/DL (ref 11.5–16)
SPECIMEN EXP DATE BLD: NORMAL
STATUS OF UNIT,%ST: NORMAL
STATUS OF UNIT,%ST: NORMAL
UNIT DIVISION, %UDIV: 0
UNIT DIVISION, %UDIV: 0

## 2021-07-08 PROCEDURE — 36415 COLL VENOUS BLD VENIPUNCTURE: CPT

## 2021-07-08 PROCEDURE — 74011250637 HC RX REV CODE- 250/637: Performed by: INTERNAL MEDICINE

## 2021-07-08 PROCEDURE — 74011250637 HC RX REV CODE- 250/637: Performed by: NURSE PRACTITIONER

## 2021-07-08 PROCEDURE — 74011250636 HC RX REV CODE- 250/636: Performed by: STUDENT IN AN ORGANIZED HEALTH CARE EDUCATION/TRAINING PROGRAM

## 2021-07-08 PROCEDURE — 74011000258 HC RX REV CODE- 258: Performed by: STUDENT IN AN ORGANIZED HEALTH CARE EDUCATION/TRAINING PROGRAM

## 2021-07-08 PROCEDURE — 74011250637 HC RX REV CODE- 250/637: Performed by: STUDENT IN AN ORGANIZED HEALTH CARE EDUCATION/TRAINING PROGRAM

## 2021-07-08 PROCEDURE — 74011000250 HC RX REV CODE- 250: Performed by: STUDENT IN AN ORGANIZED HEALTH CARE EDUCATION/TRAINING PROGRAM

## 2021-07-08 PROCEDURE — 85018 HEMOGLOBIN: CPT

## 2021-07-08 PROCEDURE — 94640 AIRWAY INHALATION TREATMENT: CPT

## 2021-07-08 PROCEDURE — 99218 HC RM OBSERVATION: CPT

## 2021-07-08 PROCEDURE — 74011000250 HC RX REV CODE- 250: Performed by: INTERNAL MEDICINE

## 2021-07-08 RX ORDER — IPRATROPIUM BROMIDE AND ALBUTEROL SULFATE 2.5; .5 MG/3ML; MG/3ML
3 SOLUTION RESPIRATORY (INHALATION) ONCE
Status: COMPLETED | OUTPATIENT
Start: 2021-07-08 | End: 2021-07-08

## 2021-07-08 RX ORDER — POLYETHYLENE GLYCOL 3350, SODIUM SULFATE, SODIUM CHLORIDE, POTASSIUM CHLORIDE, SODIUM ASCORBATE, AND ASCORBIC ACID 7.5-2.691G
2 KIT ORAL ONCE
Status: DISPENSED | OUTPATIENT
Start: 2021-07-08 | End: 2021-07-09

## 2021-07-08 RX ORDER — METOPROLOL SUCCINATE 25 MG/1
12.5 TABLET, EXTENDED RELEASE ORAL DAILY
Status: DISCONTINUED | OUTPATIENT
Start: 2021-07-08 | End: 2021-07-10 | Stop reason: HOSPADM

## 2021-07-08 RX ADMIN — ARFORMOTEROL TARTRATE: 15 SOLUTION RESPIRATORY (INHALATION) at 09:31

## 2021-07-08 RX ADMIN — GABAPENTIN 800 MG: 400 CAPSULE ORAL at 10:43

## 2021-07-08 RX ADMIN — IPRATROPIUM BROMIDE AND ALBUTEROL SULFATE 3 ML: .5; 3 SOLUTION RESPIRATORY (INHALATION) at 11:03

## 2021-07-08 RX ADMIN — Medication 10 ML: at 06:39

## 2021-07-08 RX ADMIN — PANTOPRAZOLE SODIUM 40 MG: 40 TABLET, DELAYED RELEASE ORAL at 06:39

## 2021-07-08 RX ADMIN — METOPROLOL SUCCINATE 12.5 MG: 25 TABLET, EXTENDED RELEASE ORAL at 11:00

## 2021-07-08 RX ADMIN — GABAPENTIN 800 MG: 400 CAPSULE ORAL at 18:10

## 2021-07-08 RX ADMIN — IRON SUCROSE 200 MG: 20 INJECTION, SOLUTION INTRAVENOUS at 10:46

## 2021-07-08 RX ADMIN — Medication 1000 UNITS: at 08:49

## 2021-07-08 RX ADMIN — GABAPENTIN 800 MG: 400 CAPSULE ORAL at 21:13

## 2021-07-08 NOTE — PROGRESS NOTES
[address] [facility] Adult  Hospitalist Group                                                                                          Hospitalist Progress Note  Abby Dejesus MD  Answering service: [phone] from in house phone        Date of Service:  2021  NAME:  Aline Sanchez  :  1955  MRN:  [id number]      Admission Summary:   Aline Sanchez is a 72 y.o. female with pmh of cataracts, COPD, HTN,NIDDM who presents with anemia found incidentally pre op. Planned to undergo cataract surgery. Patient went into see her PCP, had preop labs drawn and found to have significant anemia with hemoglobin of 5.8. Patient states has been a while since she had last seen her primary care physician due to the COVID-19 pandemic. She denies any symptoms acutely, but on further interview states that she has been more lethargic, fatigued, and has some dyspnea on exertion. She denies any chest pain. No obvious blood in the stool. She denies any uterine bleeding. She states that she typically has decreased p.o. intake at home. She has never had a colonoscopy or endoscopy. Interval history / Subjective:     Patient seen today, doing fine, has no complaints. No blood in the stool in the urine     Assessment & Plan:     Acute on chronic anemia, likely iron deficiency given low MCV  - Hemoglobin 8.2, s/p 1 unit of PRBC. Iron studiesserum iron 18, ferritin 4. Started on Venofer 200 mg IV x3 doses. GI consultedplans to do EGD and colonoscopy tomorrow. Folate 7.5, B12 - 405.   Started on folic acid 1 mg daily.  - Patient will need EGD and colonoscopy, she would prefer to do this outpatient if possible.  -N.p.o. midnight     NIDDM   A1c 6.8.  - SSI, POCT glucose checks and hypoglycemia protocol  - A1c will be falsely low given severe anemia and already received PRBC transfusion   - Hold oral hypoglycemics      Cataracts - likely can still undergo surgery later this month as long as hemoglobin remains stable      COPD - home inhalers, and PRN albuterol. Not in exacerbation currently. O2 if needed for Sa greater than 88%  HTN - hold antihypertensives, BP low and in setting of anemia   Nicotine dependence - nicotine patch      Code status: Full code  DVT prophylaxis: SCD    Care Plan discussed with: Patient/Family and Nurse  Anticipated Disposition: Home w/Family  Anticipated Discharge: 24 hours to 48 hours     Hospital Problems  Date Reviewed: 7/6/2021        Codes Class Noted POA    Anemia ICD-10-CM: D64.9  ICD-9-CM: 285.9  7/7/2021 Unknown                Review of Systems:   A comprehensive review of systems was negative except for that written in the HPI. Vital Signs:    Last 24hrs VS reviewed since prior progress note. Most recent are:  Visit Vitals  /82 (BP 1 Location: Left upper arm, BP Patient Position: Sitting)   Pulse 81   Temp 97.5 °F (36.4 °C)   Resp 16   Ht 5' 3\" (1.6 m)   Wt 65.8 kg (145 lb)   SpO2 97%   BMI 25.69 kg/m²         Intake/Output Summary (Last 24 hours) at 7/8/2021 1343  Last data filed at 7/8/2021 0024  Gross per 24 hour   Intake 696.3 ml   Output    Net 696.3 ml        Physical Examination:     I had a face to face encounter with this patient and independently examined them on 7/8/2021 as outlined below:          Constitutional:  No acute distress, cooperative, pleasant    ENT:  Oral mucosa moist, oropharynx benign. Resp:  CTA bilaterally. No wheezing/rhonchi/rales. No accessory muscle use   CV:  Regular rhythm, normal rate, no murmurs, gallops, rubs    GI:  Soft, non distended, non tender. normoactive bowel sounds, no hepatosplenomegaly     Musculoskeletal:  No edema, warm, 2+ pulses throughout    Neurologic:  Moves all extremities.   AAOx3, CN II-XII reviewed            Data Review:    Review and/or order of clinical lab test      Labs:     Recent Labs     07/08/21  1032 07/08/21  0250 07/07/21  1800 07/07/21  1800 07/07/21  1121 07/07/21  1121 07/06/21  1156 07/06/21  1156   WBC  --   --   --   --   --  10.1  --  10.6   HGB 8.2* 7.4*   < > 6.6*   < > 5.6*   < > 5.8*   HCT  --   --   --  23.0*  --  21.6*   < > 22.9*   PLT  --   --   --   --   --  487*  --  524*    < > = values in this interval not displayed. Recent Labs     07/07/21  1121 07/06/21  1156    139   K 3.5 4.6    103   CO2 28 29   BUN 12 11   CREA 1.03* 1.18*   * 126*   CA 9.2 9.7     Recent Labs     07/07/21  1121 07/06/21  1156   ALT 11* 13   * 138*   TBILI 0.5 0.4   TP 7.4 7.3   ALB 3.0* 3.3*   GLOB 4.4* 4.0     No results for input(s): INR, PTP, APTT, INREXT in the last 72 hours. Recent Labs     07/07/21  1121   TIBC 436   PSAT 4*   FERR 4*      Lab Results   Component Value Date/Time    Folate 7.5 07/07/2021 11:21 AM      No results for input(s): PH, PCO2, PO2 in the last 72 hours. No results for input(s): CPK, CKNDX, TROIQ in the last 72 hours.     No lab exists for component: CPKMB  Lab Results   Component Value Date/Time    Cholesterol, total 192 07/06/2021 11:56 AM    HDL Cholesterol 45 07/06/2021 11:56 AM    LDL, calculated 121.6 (H) 07/06/2021 11:56 AM    Triglyceride 127 07/06/2021 11:56 AM    CHOL/HDL Ratio 4.3 07/06/2021 11:56 AM     No results found for: GLUCPOC  No results found for: COLOR, APPRN, SPGRU, REFSG, RIHCARD, PROTU, GLUCU, KETU, BILU, UROU, DENIS, LEUKU, GLUKE, EPSU, BACTU, WBCU, RBCU, CASTS, UCRY      Medications Reviewed:     Current Facility-Administered Medications   Medication Dose Route Frequency    iron sucrose (VENOFER) 200 mg in 0.9% sodium chloride 100 mL IVPB  200 mg IntraVENous Q24H    metoprolol succinate (TOPROL-XL) XL tablet 12.5 mg  12.5 mg Oral DAILY    peg 3350-Electrolytes-Vit C (MOVIPREP) oral solution 2 L  2 L Oral ONCE    0.9% sodium chloride infusion 250 mL  250 mL IntraVENous PRN    sodium chloride (NS) flush 5-40 mL  5-40 mL IntraVENous Q8H    sodium chloride (NS) flush 5-40 mL  5-40 mL IntraVENous PRN    acetaminophen (TYLENOL) tablet 650 mg  650 mg Oral Q6H PRN    Or    acetaminophen (TYLENOL) suppository 650 mg  650 mg Rectal Q6H PRN    polyethylene glycol (MIRALAX) packet 17 g  17 g Oral DAILY PRN    ondansetron (ZOFRAN ODT) tablet 4 mg  4 mg Oral Q8H PRN    Or    ondansetron (ZOFRAN) injection 4 mg  4 mg IntraVENous Q6H PRN    albuterol (PROVENTIL VENTOLIN) nebulizer solution 2.5 mg  2.5 mg Nebulization Q6H PRN    cholecalciferol (VITAMIN D3) (1000 Units /25 mcg) tablet 1,000 Units  1,000 Units Oral DAILY    gabapentin (NEURONTIN) capsule 800 mg  800 mg Oral TID    LORazepam (ATIVAN) tablet 0.5 mg  0.5 mg Oral QHS PRN    pantoprazole (PROTONIX) tablet 40 mg  40 mg Oral ACB    arformoterol 15 mcg/budesonide 0.5 mg neb solution   Nebulization BID    lidocaine 4 % patch 1 Patch  1 Patch TransDERmal Q24H    nicotine (NICODERM CQ) 21 mg/24 hr patch 1 Patch  1 Patch TransDERmal Q24H     ______________________________________________________________________  EXPECTED LENGTH OF STAY: - - -  ACTUAL LENGTH OF STAY:          0                 Ana Maria Johnson MD

## 2021-07-08 NOTE — PROGRESS NOTES
Primary Nurse Tressa Chaney RN and Radha Brown RN performed a dual skin assessment on this patient No impairment noted  Remington score is 23

## 2021-07-08 NOTE — CONSULTS
[address]    [address] [address]        GASTROENTEROLOGY CONSULTATION NOTE  Will Harpreet Garsia  [phone] office  [phone] NP/PA in-hospital cell phone M-F until 4:30PM  After 5PM or on weekends, please call  for physician on call        NAME:  Larissa Aj   :   1955   MRN:   [id number]       Referring Physician: Dr. Ashley Gambino Date: 2021 11:21 AM    Chief Complaint: fatigue     History of Present Illness:  Patient is a 72 y.o. who is seen in consultation at the request of Dr. Dior Barker for severe iron deficiency anemia, needs scopes, hopefully outpatient. Patient has a past medical history significant for breast cancer status post surgery and radiation (reportedly 10 years ago), hypertension, COPD, and diabetes mellitus. She was scheduled to have cataract surgery and was found to be anemic on routine labs. Hgb was 5.8. Patient was admitted to the hospital on 21 for acute on chronic anemia. Patient reports some fatigue and dyspnea on exertion. No nausea, vomiting, or abdominal pain. She reports infrequent reflux. Per review of prior to admission medications, patient is on omeprazole 20 mg daily. No dysphagia or odynophagia. No change in bowel habits, stool caliber, melena, or hematochezia. No fevers or chills.  + Weight loss (8lbs over the last year). Occasional NSAID use. No anticoagulation. No alcohol use.  + Tobacco use. Occasional marijuana use. No family history of colon cancer. No history of EGD or colonoscopy. I have reviewed the emergency room note, hospital admission note, notes by all other clinicians who have seen the patient during this hospitalization to date. I have reviewed the problem list and the reason for this hospitalization. I have reviewed the allergies and the medications the patient was taking at home prior to this hospitalization.       PMH:  Past Medical History: Diagnosis Date    Acute bronchitis 4/7/2016    Atopic dermatitis 4/7/2016    Breast cancer (HCC)     Chronic obstructive pulmonary disease (HCC)     COPD (chronic obstructive pulmonary disease) with chronic bronchitis ([facility] [address] 7/26/2018    Essential hypertension 3/31/2016    Eye problems 3/31/2016    EVER (generalized anxiety disorder) 10/11/2018    Panic anxiety syndrome 10/11/2018    Tremor of face and hands 10/11/2018       PSH:  Past Surgical History:   Procedure Laterality Date    HX BREAST LUMPECTOMY      HX HYSTERECTOMY         Allergies:  No Known Allergies    Home Medications:  Prior to Admission Medications   Prescriptions Last Dose Informant Patient Reported? Taking? Blood-Glucose Meter monitoring kit   No No   Sig: Monitor BG once daily and as needed  Dx: E11.42  Provide with glucometer that insurance covers. Janumet  mg per tablet   No No   Sig: TAKE ONE TABLET BY MOUTH TWICE A DAY WITH MEALS   LORazepam (ATIVAN) 0.5 mg tablet   No No   Sig: TAKE ONE TABLET BY MOUTH ONCE NIGHTLY AS NEEDED FOR SLEEP   Symbicort 160-4.5 mcg/actuation HFAA   No No   Sig: INHALE TWO PUFFS BY MOUTH TWICE A DAY   albuterol (PROVENTIL HFA, VENTOLIN HFA, PROAIR HFA) 90 mcg/actuation inhaler   No No   Sig: INHALE TWO PUFFS BY MOUTH EVERY 4 HOURS AS NEEDED FOR WHEEZING AND SHORTNESS OF BREATH   albuterol-ipratropium (DUO-NEB) 2.5 mg-0.5 mg/3 ml nebu   No No   Sig: INHALE THREE MILLILITERS VIA NEBULIZATION BY MOUTH EVERY 4 HOURS AS NEEDED FOR WHEEZING   cholecalciferol (VITAMIN D3) 1,000 unit tablet   Yes No   Sig: Take 1,000 Units by mouth daily. gabapentin (NEURONTIN) 800 mg tablet   No No   Sig: Take 1 Tablet by mouth three (3) times daily. Max Daily Amount: 2,400 mg.   glucose blood VI test strips (BLOOD GLUCOSE TEST) strip   No No   Sig: Monitor BG once daily and as needed  Dx: E11.42  Provide with glucometer/strips that insurance covers.    lancets misc   No No   Sig: Monitor BG once daily and as needed Dx: E11.42  Provide with glucometer/lancets that insurance covers. lidocaine (LIDODERM) 5 %   No No   Sig: APPLY 1 PATCH TO AFFECTED AREA FOR 12 HOURS IN A 24 HOUR PERIOD   losartan-hydroCHLOROthiazide (HYZAAR) 100-25 mg per tablet   No No   Sig: TAKE ONE TABLET BY MOUTH DAILY   metoprolol succinate (TOPROL-XL) 25 mg XL tablet   No No   Sig: TAKE 1/2 (ONE-HALF)TABLET BY MOUTH DAILY   omeprazole (PRILOSEC) 20 mg capsule   No No   Sig: TAKE ONE CAPSULE BY MOUTH DAILY   rosuvastatin (CRESTOR) 5 mg tablet   No No   Sig: TAKE ONE TABLET BY MOUTH ONCE NIGHTLY   Patient not taking: Reported on 11/4/2020   varenicline (CHANTIX STARTER FILEMON) 0.5 mg (11)- 1 mg (42) DsPk   No No   Sig: See pack instructions. varenicline (CHANTIX) 1 mg tablet   No No   Sig: Take 1 Tab by mouth two (2) times a day.    Patient not taking: Reported on 11/4/2020      Facility-Administered Medications: None       Hospital Medications:  Current Facility-Administered Medications   Medication Dose Route Frequency    iron sucrose (VENOFER) 200 mg in 0.9% sodium chloride 100 mL IVPB  200 mg IntraVENous Q24H    metoprolol succinate (TOPROL-XL) XL tablet 12.5 mg  12.5 mg Oral DAILY    0.9% sodium chloride infusion 250 mL  250 mL IntraVENous PRN    sodium chloride (NS) flush 5-40 mL  5-40 mL IntraVENous Q8H    sodium chloride (NS) flush 5-40 mL  5-40 mL IntraVENous PRN    acetaminophen (TYLENOL) tablet 650 mg  650 mg Oral Q6H PRN    Or    acetaminophen (TYLENOL) suppository 650 mg  650 mg Rectal Q6H PRN    polyethylene glycol (MIRALAX) packet 17 g  17 g Oral DAILY PRN    ondansetron (ZOFRAN ODT) tablet 4 mg  4 mg Oral Q8H PRN    Or    ondansetron (ZOFRAN) injection 4 mg  4 mg IntraVENous Q6H PRN    albuterol (PROVENTIL VENTOLIN) nebulizer solution 2.5 mg  2.5 mg Nebulization Q6H PRN    cholecalciferol (VITAMIN D3) (1000 Units /25 mcg) tablet 1,000 Units  1,000 Units Oral DAILY    gabapentin (NEURONTIN) capsule 800 mg  800 mg Oral TID    LORazepam (ATIVAN) tablet 0.5 mg  0.5 mg Oral QHS PRN    pantoprazole (PROTONIX) tablet 40 mg  40 mg Oral ACB    arformoterol 15 mcg/budesonide 0.5 mg neb solution   Nebulization BID    lidocaine 4 % patch 1 Patch  1 Patch TransDERmal Q24H    nicotine (NICODERM CQ) 21 mg/24 hr patch 1 Patch  1 Patch TransDERmal Q24H       Social History:  Social History     Tobacco Use    Smoking status: Current Every Day Smoker     Packs/day: 1.00     Types: Cigarettes    Smokeless tobacco: Never Used   Substance Use Topics    Alcohol use: No       Family History:  Family History   Problem Relation Age of Onset    Diabetes Father     Arthritis-osteo Sister     Diabetes Sister     Gall Bladder Disease Sister        Review of Systems:  Constitutional: negative fever, negative chills, + weight loss, + fatigue  Eyes:   negative visual changes  ENT:   negative sore throat, tongue or lip swelling  Respiratory:  negative cough, + dyspnea on exertion  Cards:  negative for chest pain, palpitations, lower extremity edema  GI:   See HPI  :  negative for frequency, dysuria  Integument:  negative for rash and pruritus  Heme:  negative for easy bruising and gum/nose bleeding  Musculoskeletal:negative for myalgias, back pain and muscle weakness  Neuro:  negative for headaches, dizziness  Psych: negative for feelings of anxiety, depression        Objective:     Patient Vitals for the past 8 hrs:   BP Temp Pulse Resp SpO2   07/08/21 1105     97 %   07/08/21 0932     98 %   07/08/21 0757 137/82 97.5 °F (36.4 °C) 81 16 95 %   07/08/21 0430 108/63 97 °F (36.1 °C) 65 16 95 %     No intake/output data recorded.   07/06 1901 - 07/08 0700  In: 696.3   Out: 150 [Urine:150]    EXAM:     CONST:  Pleasant female lying in bed, no acute distress   NEURO:  Alert and oriented x 3   HEENT: EOMI, no scleral icterus   LUNGS: No acute respiratory distress   CARD:  S1 S2   ABD:  Soft, non distended, no tenderness, no rebound, no guarding. + Bowel sounds. EXT:  Warm   PSYCH: Full, not anxious or agitated       Data Review     Recent Labs     07/08/21  1032 07/08/21  0250 07/07/21  1800 07/07/21  1800 07/07/21  1121 07/07/21  1121 07/06/21  1156 07/06/21  1156   WBC  --   --   --   --   --  10.1  --  10.6   HGB 8.2* 7.4*   < > 6.6*   < > 5.6*   < > 5.8*   HCT  --   --   --  23.0*  --  21.6*   < > 22.9*   PLT  --   --   --   --   --  487*  --  524*    < > = values in this interval not displayed. Recent Labs     07/07/21  1121 07/06/21  1156    139   K 3.5 4.6    103   CO2 28 29   BUN 12 11   CREA 1.03* 1.18*   * 126*   CA 9.2 9.7     Recent Labs     07/07/21  1121 07/06/21  1156   * 138*   TP 7.4 7.3   ALB 3.0* 3.3*   GLOB 4.4* 4.0     No results for input(s): INR, PTP, APTT, INREXT in the last 72 hours. Assessment:   · Iron deficiency anemia with hemoccult negative stool: Hgb 8.2 (5.8 on 7/6, 11.9 in 11/2019), platelets 211, BUN normal, iron 18, iron saturation 4%, TIBC 436, ferritin 4. Received 2 units PRBCs. Differential includes peptic ulcer disease, AVMs, and malignancy. · Diabetes mellitus   · COPD  · Hypertension  · History of breast cancer status post surgery and radiation     Patient Active Problem List   Diagnosis Code    Essential hypertension I10    Eye problems H57.9    Bronchial asthma J45.909    Acute bronchitis J20.9    Atopic dermatitis L20.9    COPD (chronic obstructive pulmonary disease) with chronic bronchitis (HCC) J44.9    EVER (generalized anxiety disorder) F41.1    Panic anxiety syndrome F41.0    Tremor of face and hands R25.1    Anemia D64.9     Plan:   · Clear liquids. Bowel prep this evening. NPO after midnight. · On PPI  · Receiving IV iron  · Trend CBC and transfuse as necessary  · Plan for EGD and colonoscopy tomorrow with Dr. Francisco Cooper. Details and risks of the procedures to include (but not limited to) anesthesia, bleeding, infection, and perforation were discussed.  Patient understands and is in agreement to proceed. She reports discussing with her children. Vaccinated for COVID-19. · Patient was discussed with Dr. Sary Lopez  · Thank you for allowing me to participate in care of Amilcar Zaidi     Signed By: Kristin Emerson     7/8/2021  11:21 AM     Gastroenterology Attending Physician attestation statement and comments. This patient was seen and examined by me in a face-to-face visit today. I reviewed the medical record including lab work, imaging and other provider notes. I confirmed the history as described above. I spoke to the patient, reviewed the medical record including lab work, imaging and other provider notes. I discussed this case in detail with modesto jennings. I formulated an  assessment of this patient and developed a treatment plan. I agree with the above consultation note. I agree with the history, exam and assessment and plan as outlined in the note.   I would like to add the following:   Abdomen soft  No GI complaints  NISREEN anemia  EGD/colonoscopy in am , she is agreeable

## 2021-07-09 ENCOUNTER — ANESTHESIA (OUTPATIENT)
Dept: ENDOSCOPY | Age: 66
DRG: 378 | End: 2021-07-09
Payer: MEDICARE

## 2021-07-09 ENCOUNTER — ANESTHESIA EVENT (OUTPATIENT)
Dept: ENDOSCOPY | Age: 66
DRG: 378 | End: 2021-07-09
Payer: MEDICARE

## 2021-07-09 VITALS
HEIGHT: 63 IN | TEMPERATURE: 98 F | BODY MASS INDEX: 25.69 KG/M2 | WEIGHT: 145 LBS | HEART RATE: 80 BPM | DIASTOLIC BLOOD PRESSURE: 61 MMHG | RESPIRATION RATE: 23 BRPM | SYSTOLIC BLOOD PRESSURE: 115 MMHG | OXYGEN SATURATION: 100 %

## 2021-07-09 PROBLEM — K92.2 ACUTE GI BLEEDING: Status: ACTIVE | Noted: 2021-07-09

## 2021-07-09 PROBLEM — E61.1 IRON DEFICIENCY: Status: ACTIVE | Noted: 2021-07-09

## 2021-07-09 PROBLEM — D62 ACUTE BLOOD LOSS ANEMIA: Status: ACTIVE | Noted: 2021-07-09

## 2021-07-09 LAB
ANION GAP SERPL CALC-SCNC: 5 MMOL/L (ref 5–15)
BASOPHILS # BLD: 0.1 K/UL (ref 0–0.1)
BASOPHILS NFR BLD: 1 % (ref 0–1)
BUN SERPL-MCNC: 10 MG/DL (ref 6–20)
BUN/CREAT SERPL: 11 (ref 12–20)
CALCIUM SERPL-MCNC: 9.4 MG/DL (ref 8.5–10.1)
CHLORIDE SERPL-SCNC: 103 MMOL/L (ref 97–108)
CO2 SERPL-SCNC: 29 MMOL/L (ref 21–32)
CREAT SERPL-MCNC: 0.91 MG/DL (ref 0.55–1.02)
DIFFERENTIAL METHOD BLD: ABNORMAL
EOSINOPHIL # BLD: 0.3 K/UL (ref 0–0.4)
EOSINOPHIL NFR BLD: 3 % (ref 0–7)
ERYTHROCYTE [DISTWIDTH] IN BLOOD BY AUTOMATED COUNT: 26 % (ref 11.5–14.5)
GLUCOSE SERPL-MCNC: 107 MG/DL (ref 65–100)
HCT VFR BLD AUTO: 29.2 % (ref 35–47)
HGB BLD-MCNC: 8.4 G/DL (ref 11.5–16)
IMM GRANULOCYTES # BLD AUTO: 0.1 K/UL (ref 0–0.04)
IMM GRANULOCYTES NFR BLD AUTO: 1 % (ref 0–0.5)
LYMPHOCYTES # BLD: 1.7 K/UL (ref 0.8–3.5)
LYMPHOCYTES NFR BLD: 19 % (ref 12–49)
MCH RBC QN AUTO: 21.1 PG (ref 26–34)
MCHC RBC AUTO-ENTMCNC: 28.8 G/DL (ref 30–36.5)
MCV RBC AUTO: 73.2 FL (ref 80–99)
MONOCYTES # BLD: 0.7 K/UL (ref 0–1)
MONOCYTES NFR BLD: 8 % (ref 5–13)
NEUTS SEG # BLD: 6 K/UL (ref 1.8–8)
NEUTS SEG NFR BLD: 68 % (ref 32–75)
NRBC # BLD: 0.03 K/UL (ref 0–0.01)
NRBC BLD-RTO: 0.3 PER 100 WBC
PLATELET # BLD AUTO: 454 K/UL (ref 150–400)
PLATELET COMMENTS,PCOM: ABNORMAL
PMV BLD AUTO: 10.3 FL (ref 8.9–12.9)
POTASSIUM SERPL-SCNC: 3.8 MMOL/L (ref 3.5–5.1)
RBC # BLD AUTO: 3.99 M/UL (ref 3.8–5.2)
RBC MORPH BLD: ABNORMAL
SODIUM SERPL-SCNC: 137 MMOL/L (ref 136–145)
WBC # BLD AUTO: 8.9 K/UL (ref 3.6–11)

## 2021-07-09 PROCEDURE — 0DJD8ZZ INSPECTION OF LOWER INTESTINAL TRACT, VIA NATURAL OR ARTIFICIAL OPENING ENDOSCOPIC: ICD-10-PCS | Performed by: INTERNAL MEDICINE

## 2021-07-09 PROCEDURE — 99218 HC RM OBSERVATION: CPT

## 2021-07-09 PROCEDURE — 74011250637 HC RX REV CODE- 250/637: Performed by: INTERNAL MEDICINE

## 2021-07-09 PROCEDURE — 0W3P8ZZ CONTROL BLEEDING IN GASTROINTESTINAL TRACT, VIA NATURAL OR ARTIFICIAL OPENING ENDOSCOPIC: ICD-10-PCS | Performed by: INTERNAL MEDICINE

## 2021-07-09 PROCEDURE — 94664 DEMO&/EVAL PT USE INHALER: CPT

## 2021-07-09 PROCEDURE — 77030036656: Performed by: INTERNAL MEDICINE

## 2021-07-09 PROCEDURE — 2709999900 HC NON-CHARGEABLE SUPPLY: Performed by: INTERNAL MEDICINE

## 2021-07-09 PROCEDURE — 74011000250 HC RX REV CODE- 250: Performed by: STUDENT IN AN ORGANIZED HEALTH CARE EDUCATION/TRAINING PROGRAM

## 2021-07-09 PROCEDURE — 76040000007: Performed by: INTERNAL MEDICINE

## 2021-07-09 PROCEDURE — 65660000000 HC RM CCU STEPDOWN

## 2021-07-09 PROCEDURE — 74011250636 HC RX REV CODE- 250/636: Performed by: STUDENT IN AN ORGANIZED HEALTH CARE EDUCATION/TRAINING PROGRAM

## 2021-07-09 PROCEDURE — 74011250636 HC RX REV CODE- 250/636: Performed by: INTERNAL MEDICINE

## 2021-07-09 PROCEDURE — 94640 AIRWAY INHALATION TREATMENT: CPT

## 2021-07-09 PROCEDURE — 74011250637 HC RX REV CODE- 250/637: Performed by: STUDENT IN AN ORGANIZED HEALTH CARE EDUCATION/TRAINING PROGRAM

## 2021-07-09 PROCEDURE — 74011000258 HC RX REV CODE- 258: Performed by: STUDENT IN AN ORGANIZED HEALTH CARE EDUCATION/TRAINING PROGRAM

## 2021-07-09 PROCEDURE — 77030035621 HC PRB COAG APC 360DEG ERBE -C: Performed by: INTERNAL MEDICINE

## 2021-07-09 PROCEDURE — 36415 COLL VENOUS BLD VENIPUNCTURE: CPT

## 2021-07-09 PROCEDURE — 74011250636 HC RX REV CODE- 250/636: Performed by: NURSE ANESTHETIST, CERTIFIED REGISTERED

## 2021-07-09 PROCEDURE — 76060000032 HC ANESTHESIA 0.5 TO 1 HR: Performed by: INTERNAL MEDICINE

## 2021-07-09 PROCEDURE — 85025 COMPLETE CBC W/AUTO DIFF WBC: CPT

## 2021-07-09 PROCEDURE — 80048 BASIC METABOLIC PNL TOTAL CA: CPT

## 2021-07-09 PROCEDURE — 74011000250 HC RX REV CODE- 250: Performed by: INTERNAL MEDICINE

## 2021-07-09 RX ORDER — EPINEPHRINE 0.1 MG/ML
1 INJECTION INTRACARDIAC; INTRAVENOUS
Status: DISCONTINUED | OUTPATIENT
Start: 2021-07-09 | End: 2021-07-09 | Stop reason: HOSPADM

## 2021-07-09 RX ORDER — PROPOFOL 10 MG/ML
INJECTION, EMULSION INTRAVENOUS AS NEEDED
Status: DISCONTINUED | OUTPATIENT
Start: 2021-07-09 | End: 2021-07-09 | Stop reason: HOSPADM

## 2021-07-09 RX ORDER — ALBUTEROL SULFATE 0.83 MG/ML
2.5 SOLUTION RESPIRATORY (INHALATION)
Status: DISCONTINUED | OUTPATIENT
Start: 2021-07-09 | End: 2021-07-10 | Stop reason: HOSPADM

## 2021-07-09 RX ORDER — SODIUM CHLORIDE 0.9 % (FLUSH) 0.9 %
5-40 SYRINGE (ML) INJECTION AS NEEDED
Status: DISCONTINUED | OUTPATIENT
Start: 2021-07-09 | End: 2021-07-10 | Stop reason: HOSPADM

## 2021-07-09 RX ORDER — SODIUM CHLORIDE 0.9 % (FLUSH) 0.9 %
5-40 SYRINGE (ML) INJECTION EVERY 8 HOURS
Status: DISCONTINUED | OUTPATIENT
Start: 2021-07-09 | End: 2021-07-10 | Stop reason: HOSPADM

## 2021-07-09 RX ORDER — NALOXONE HYDROCHLORIDE 0.4 MG/ML
0.4 INJECTION, SOLUTION INTRAMUSCULAR; INTRAVENOUS; SUBCUTANEOUS
Status: DISCONTINUED | OUTPATIENT
Start: 2021-07-09 | End: 2021-07-09 | Stop reason: HOSPADM

## 2021-07-09 RX ORDER — ATROPINE SULFATE 0.1 MG/ML
0.5 INJECTION INTRAVENOUS
Status: DISCONTINUED | OUTPATIENT
Start: 2021-07-09 | End: 2021-07-09 | Stop reason: HOSPADM

## 2021-07-09 RX ORDER — FLUMAZENIL 0.1 MG/ML
0.2 INJECTION INTRAVENOUS
Status: DISCONTINUED | OUTPATIENT
Start: 2021-07-09 | End: 2021-07-09 | Stop reason: HOSPADM

## 2021-07-09 RX ORDER — SODIUM CHLORIDE 9 MG/ML
50 INJECTION, SOLUTION INTRAVENOUS CONTINUOUS
Status: DISPENSED | OUTPATIENT
Start: 2021-07-09 | End: 2021-07-09

## 2021-07-09 RX ORDER — LANOLIN ALCOHOL/MO/W.PET/CERES
325 CREAM (GRAM) TOPICAL
Qty: 30 TABLET | Refills: 1 | Status: SHIPPED | OUTPATIENT
Start: 2021-07-09 | End: 2021-08-08

## 2021-07-09 RX ORDER — MIDAZOLAM HYDROCHLORIDE 1 MG/ML
.25-5 INJECTION, SOLUTION INTRAMUSCULAR; INTRAVENOUS
Status: DISCONTINUED | OUTPATIENT
Start: 2021-07-09 | End: 2021-07-09 | Stop reason: HOSPADM

## 2021-07-09 RX ORDER — FENTANYL CITRATE 50 UG/ML
25-200 INJECTION, SOLUTION INTRAMUSCULAR; INTRAVENOUS
Status: DISCONTINUED | OUTPATIENT
Start: 2021-07-09 | End: 2021-07-09 | Stop reason: HOSPADM

## 2021-07-09 RX ORDER — PHENYLEPHRINE HCL IN 0.9% NACL 0.4MG/10ML
SYRINGE (ML) INTRAVENOUS AS NEEDED
Status: DISCONTINUED | OUTPATIENT
Start: 2021-07-09 | End: 2021-07-09 | Stop reason: HOSPADM

## 2021-07-09 RX ORDER — DEXTROMETHORPHAN/PSEUDOEPHED 2.5-7.5/.8
1.2 DROPS ORAL
Status: DISCONTINUED | OUTPATIENT
Start: 2021-07-09 | End: 2021-07-09 | Stop reason: HOSPADM

## 2021-07-09 RX ADMIN — GABAPENTIN 800 MG: 400 CAPSULE ORAL at 17:03

## 2021-07-09 RX ADMIN — PROPOFOL 100 MG: 10 INJECTION, EMULSION INTRAVENOUS at 14:25

## 2021-07-09 RX ADMIN — PROPOFOL 75 MG: 10 INJECTION, EMULSION INTRAVENOUS at 14:43

## 2021-07-09 RX ADMIN — Medication 10 ML: at 17:05

## 2021-07-09 RX ADMIN — METOPROLOL SUCCINATE 12.5 MG: 25 TABLET, EXTENDED RELEASE ORAL at 08:44

## 2021-07-09 RX ADMIN — Medication 1000 UNITS: at 08:45

## 2021-07-09 RX ADMIN — ARFORMOTEROL TARTRATE: 15 SOLUTION RESPIRATORY (INHALATION) at 08:52

## 2021-07-09 RX ADMIN — Medication 80 MCG: at 14:50

## 2021-07-09 RX ADMIN — GABAPENTIN 800 MG: 400 CAPSULE ORAL at 08:45

## 2021-07-09 RX ADMIN — PROPOFOL 25 MG: 10 INJECTION, EMULSION INTRAVENOUS at 14:32

## 2021-07-09 RX ADMIN — ALBUTEROL SULFATE 2.5 MG: 2.5 SOLUTION RESPIRATORY (INHALATION) at 08:52

## 2021-07-09 RX ADMIN — ALBUTEROL SULFATE 2.5 MG: 2.5 SOLUTION RESPIRATORY (INHALATION) at 12:30

## 2021-07-09 RX ADMIN — PROPOFOL 100 MG: 10 INJECTION, EMULSION INTRAVENOUS at 14:47

## 2021-07-09 RX ADMIN — Medication 10 ML: at 13:36

## 2021-07-09 RX ADMIN — IRON SUCROSE 200 MG: 20 INJECTION, SOLUTION INTRAVENOUS at 10:09

## 2021-07-09 RX ADMIN — PROPOFOL 100 MG: 10 INJECTION, EMULSION INTRAVENOUS at 14:38

## 2021-07-09 RX ADMIN — Medication 80 MCG: at 14:55

## 2021-07-09 RX ADMIN — PANTOPRAZOLE SODIUM 40 MG: 40 TABLET, DELAYED RELEASE ORAL at 08:45

## 2021-07-09 RX ADMIN — ALBUTEROL SULFATE 2.5 MG: 2.5 SOLUTION RESPIRATORY (INHALATION) at 02:18

## 2021-07-09 RX ADMIN — Medication 10 ML: at 17:03

## 2021-07-09 NOTE — PROCEDURES
[address] [facility]  [address] [facility], [address] [facility] (EGD) Procedure Note    Aline Sanchez  1955  [id number]      Procedure: Endoscopic Gastroduodenoscopy with control of bleeding    Indication:  Iron deficiency anemia     Pre-operative Diagnosis: see indication above    Post-operative Diagnosis: see findings below    : Jody Young MD    Surgical Assistant: Endoscopy Technician-1: Nida Soulier  Endoscopy RN-1: Anika Bernal RN    Implants:  None    Referring Provider:  Maria Elena Hendricks NP      Anesthesia/Sedation:  MAC anesthesia Propofol        Procedure Details     After infomed consent was obtained for the procedure, with all risks and benefits of procedure explained the patient was taken to the endoscopy suite and placed in the left lateral decubitus position. Following sequential administration of sedation as per above, the endoscope was inserted into the mouth and advanced under direct vision to third portion of the duodenum. A careful inspection was made as the gastroscope was withdrawn, including a retroflexed view of the proximal stomach; findings and interventions are described below. Findings:   Esophagus:normal  Stomach: normal   Duodenum/jejunum: normal     5 non bleeding AVM ( arteriovenous malformation) in first and second portion of DU, ranging in size between 5 and 10 mm, I completely cauterized them with APC ( ARGON PLASMA COAGULATION)       Therapies:  AS ABOVE    Specimens: none         EBL: None      Complications:   None; patient tolerated the procedure well. Impression:    -See post-procedure diagnoses.     Recommendations:  -COLONOSCOPY TODAY    Signed By: Jody Young MD     7/9/2021  3:04 PM

## 2021-07-09 NOTE — PERIOP NOTES
TRANSFER - IN REPORT:    Verbal report received from [address] Caesar RN on Jessica Contreras  being received from [phone] for ordered procedure      Report consisted of patients Situation, Background, Assessment and   Recommendations(SBAR). Information from the following report(s) SBAR, Cardiac Rhythm SR, Pre Procedure Checklist and Procedure Verification was reviewed with the receiving nurse. Opportunity for questions and clarification was provided. Assessment completed upon patients arrival to unit and care assumed. TRANSFER - OUT REPORT:    Verbal report given to BORIS Nuñez on Jessica Contreras  being transferred to [phone] for routine progression of care       Report consisted of patients Situation, Background, Assessment and   Recommendations(SBAR). Information from the following report(s) SBAR, Procedure Summary, Cardiac Rhythm SR and Quality Measures was reviewed with the receiving nurse. Lines:   Peripheral IV 07/09/21 Left;Posterior Hand (Active)   Site Assessment Clean, dry, & intact 07/09/21 1421   Phlebitis Assessment 0 07/09/21 1421   Dressing Status Clean, dry, & intact 07/09/21 1421   Dressing Type Transparent;Tape 07/09/21 1421        Opportunity for questions and clarification was provided.       Patient transported with:   Yolis Grover IV

## 2021-07-09 NOTE — DISCHARGE SUMMARY
Hospitalist Discharge Summary     Patient ID:  Ananth Hester  [id number]  72 y.o.  1955 7/7/2021    PCP on record: Marbin Leiva NP    Admit date: 7/7/2021  Discharge date and time: 7/9/2021    DISCHARGE DIAGNOSIS:  Acute GI bleed- AVM   Acute on chronic anemia- Iron deficinecy  COPD  HTN  DM-2      CONSULTATIONS:  IP CONSULT TO HOSPITALIST  IP CONSULT TO GASTROENTEROLOGY    Excerpted HPI from H&P of Jazzy Martínez MD:  Ananth Hester is a 72 y.o. female with pmh of cataracts, COPD, HTN,NIDDM who presents with anemia found incidentally pre op. Planned to undergo cataract surgery. Patient went into see her PCP, had preop labs drawn and found to have significant anemia with hemoglobin of 5.8. Patient states has been a while since she had last seen her primary care physician due to the COVID-19 pandemic. She denies any symptoms acutely, but on further interview states that she has been more lethargic, fatigued, and has some dyspnea on exertion. She denies any chest pain. No obvious blood in the stool. She denies any uterine bleeding. She states that she typically has decreased p.o. intake at home. She has never had a colonoscopy or endoscopy.    ______________________________________________________________________  DISCHARGE SUMMARY/HOSPITAL COURSE:  for full details see H&P, daily progress notes, labs, consult notes. Acute on chronic anemia, likely iron deficiency given low MCV  - Hemoglobin 8.2, s/p 1 unit of PRBC. Iron studiesserum iron 18, ferritin 4. Venofer 200 mg IV x3 doses. DC on iron tablets  GI consulted  appreciate their recommendation  Folate 7.5, B12 - 405. Started on folic acid 1 mg daily.   - EGD- 5 non- bleeding AVM  - Colonoscopy - WNL     NIDDM   A1c 6.8.  - SSI, POCT glucose checks and hypoglycemia protocol  - A1c will be falsely low given severe anemia and already received PRBC transfusion   - Hold oral hypoglycemics      Cataracts - likely can still undergo surgery later this month as long as hemoglobin remains stable      COPD - home inhalers, and PRN albuterol. Not in exacerbation currently. O2 if needed for Sa greater than 88%  HTN - hold antihypertensives, BP low and in setting of anemia   Nicotine dependence - nicotine patch        _______________________________________________________________________  Patient seen and examined by me on discharge day. Pertinent Findings:  Gen:    Not in distress  Chest: Clear lungs  CVS:   Regular rhythm. No edema  Abd:  Soft, not distended, not tender  Neuro:  Alert,   _______________________________________________________________________  DISCHARGE MEDICATIONS:   Current Discharge Medication List      START taking these medications    Details   ferrous sulfate 325 mg (65 mg iron) tablet Take 1 Tablet by mouth Daily (before breakfast) for 30 days. Indications: anemia from inadequate iron  Qty: 30 Tablet, Refills: 1  Start date: 7/9/2021, End date: 8/8/2021         CONTINUE these medications which have NOT CHANGED    Details   gabapentin (NEURONTIN) 800 mg tablet Take 1 Tablet by mouth three (3) times daily. Max Daily Amount: 2,400 mg. Qty: 90 Tablet, Refills: 5    Associated Diagnoses: Type 2 diabetes mellitus with diabetic polyneuropathy, without long-term current use of insulin ([facility])      metoprolol succinate (TOPROL-XL) 25 mg XL tablet TAKE 1/2 (ONE-HALF)TABLET BY MOUTH DAILY  Qty: 45 Tablet, Refills: 2    Associated Diagnoses: Essential hypertension; COPD (chronic obstructive pulmonary disease) with chronic bronchitis ([address]  Mild intermittent asthma without complication; Acute bronchitis, unspecified organism      Symbicort 160-4.5 mcg/actuation HFAA INHALE TWO PUFFS BY MOUTH TWICE A DAY  Qty: 3 Inhaler, Refills: 3    Associated Diagnoses: Mild intermittent asthma without complication; Acute bronchitis, unspecified organism; Essential hypertension; COPD (chronic obstructive pulmonary disease) with chronic bronchitis (HCC)      Janumet  mg per tablet TAKE ONE TABLET BY MOUTH TWICE A DAY WITH MEALS  Qty: 180 Tab, Refills: 2      lidocaine (LIDODERM) 5 % APPLY 1 PATCH TO AFFECTED AREA FOR 12 HOURS IN A 24 HOUR PERIOD  Qty: 30 Patch, Refills: 5    Associated Diagnoses: Chronic bilateral low back pain without sciatica      albuterol-ipratropium (DUO-NEB) 2.5 mg-0.5 mg/3 ml nebu INHALE THREE MILLILITERS VIA NEBULIZATION BY MOUTH EVERY 4 HOURS AS NEEDED FOR WHEEZING  Qty: 30 Nebule, Refills: 5    Associated Diagnoses: Essential hypertension      losartan-hydroCHLOROthiazide (HYZAAR) 100-25 mg per tablet TAKE ONE TABLET BY MOUTH DAILY  Qty: 90 Tab, Refills: 3    Associated Diagnoses: Essential hypertension      omeprazole (PRILOSEC) 20 mg capsule TAKE ONE CAPSULE BY MOUTH DAILY  Qty: 30 Cap, Refills: 4    Associated Diagnoses: Gastroesophageal reflux disease, unspecified whether esophagitis present      LORazepam (ATIVAN) 0.5 mg tablet TAKE ONE TABLET BY MOUTH ONCE NIGHTLY AS NEEDED FOR SLEEP  Qty: 30 Tab, Refills: 2    Associated Diagnoses: Essential hypertension; COPD (chronic obstructive pulmonary disease) with chronic bronchitis (HCC); Mild intermittent asthma without complication; Acute bronchitis, unspecified organism      albuterol (PROVENTIL HFA, VENTOLIN HFA, PROAIR HFA) 90 mcg/actuation inhaler INHALE TWO PUFFS BY MOUTH EVERY 4 HOURS AS NEEDED FOR WHEEZING AND SHORTNESS OF BREATH  Qty: 18 g, Refills: 10    Associated Diagnoses: COPD (chronic obstructive pulmonary disease) with chronic bronchitis (HCC)      varenicline (CHANTIX) 1 mg tablet Take 1 Tab by mouth two (2) times a day. Qty: 60 Tab, Refills: 4      cholecalciferol (VITAMIN D3) 1,000 unit tablet Take 1,000 Units by mouth daily. Blood-Glucose Meter monitoring kit Monitor BG once daily and as needed  Dx: E11.42  Provide with glucometer that insurance covers.   Qty: 1 Kit, Refills: 0    Associated Diagnoses: Type 2 diabetes mellitus with diabetic polyneuropathy, without long-term current use of insulin (HCC)      lancets misc Monitor BG once daily and as needed  Dx: E11.42  Provide with glucometer/lancets that insurance covers. Qty: 100 Each, Refills: 11    Associated Diagnoses: Type 2 diabetes mellitus with diabetic polyneuropathy, without long-term current use of insulin (HCC)      glucose blood VI test strips (BLOOD GLUCOSE TEST) strip Monitor BG once daily and as needed  Dx: E11.42  Provide with glucometer/strips that insurance covers. Qty: 100 Strip, Refills: 11    Associated Diagnoses: Type 2 diabetes mellitus with diabetic polyneuropathy, without long-term current use of insulin (HCC)         STOP taking these medications       varenicline (CHANTIX STARTER FILEMON) 0.5 mg (11)- 1 mg (42) DsPk Comments:   Reason for Stopping:         rosuvastatin (CRESTOR) 5 mg tablet Comments:   Reason for Stopping:                 Patient Follow Up Instructions: Activity: Activity as tolerated  Diet: Cardiac Diet  Wound Care: None needed    Please get your CBC checked in 1 week after discharge from the hospital.    If you have questions regarding the hospital related prescriptions or hospital related issues please call [phone] [facility] at . You can always direct your questions to your primary care doctor if you are unable to reach your hospital physician; your PCP works as an extension of your hospital doctor just like your hospital doctor is an extension of your PCP for your time at [facility].     If you experience any of the following symptoms then please call your primary care physician or return to the emergency room if you cannot get hold of your doctor:  Fever, chills, nausea, vomiting, diarrhea, change in mentation, falling, bleeding, shortness of breath    Follow-up Information     Follow up With Specialties Details Why Contact Adam Mace NP Nurse Practitioner   [address] [place] [address]  [place] [address] [phone] Aniket Loza MD Gastroenterology In 2 weeks  [address] [facility]  [address]  [phone]          ________________________________________________________________    Risk of deterioration: Low    Condition at Discharge:  Stable  __________________________________________________________________    Disposition  Home with family, no needs    ____________________________________________________________________    Code Status: Full Code  ___________________________________________________________________      Total time in minutes spent coordinating this discharge (includes going over instructions, follow-up, prescriptions, and preparing report for sign off to her PCP) :  >30 minutes    Signed:  Rigoberto Davies MD

## 2021-07-09 NOTE — ANESTHESIA PREPROCEDURE EVALUATION
Relevant Problems   RESPIRATORY SYSTEM   (+) Bronchial asthma      NEUROLOGY   (+) EVER (generalized anxiety disorder)   (+) Panic anxiety syndrome      CARDIOVASCULAR   (+) Essential hypertension      HEMATOLOGY   (+) Acute blood loss anemia   (+) Anemia       Anesthetic History   No history of anesthetic complications            Review of Systems / Medical History  Patient summary reviewed, nursing notes reviewed and pertinent labs reviewed    Pulmonary    COPD        Asthma        Neuro/Psych   Within defined limits           Cardiovascular    Hypertension              Exercise tolerance: >4 METS     GI/Hepatic/Renal                Endo/Other        Anemia     Other Findings   Comments: Hb 8.4           Physical Exam    Airway  Mallampati: I           Cardiovascular    Rhythm: regular           Dental    Dentition: Edentulous     Pulmonary  Breath sounds clear to auscultation               Abdominal         Other Findings            Anesthetic Plan    ASA: 3  Anesthesia type: MAC          Induction: Intravenous  Anesthetic plan and risks discussed with: Patient

## 2021-07-09 NOTE — PROGRESS NOTES
REZA:    RUR N/A    Dispostion: Home    Transportation: Son    Follow up: PCP/Specialist    Care Management Interventions  PCP Verified by CM: Yes  Palliative Care Criteria Met (RRAT>21 & CHF Dx)?: No  Mode of Transport at Discharge:  (Son to transport)  Physical Therapy Consult: No  Occupational Therapy Consult: No  Speech Therapy Consult: No  Current Support Network: Lives Alone  Confirm Follow Up Transport: Family  The Patient and/or Patient Representative was Provided with a Choice of Provider and Agrees with the Discharge Plan?: Yes  Discharge Location  Discharge Placement: Home with family assistance    Reason for Admission:  Anemia                     RUR Score:   N/A                  Plan for utilizing home health:   No orders       PCP: First and Last name:  Darian Mcqueen NP     Name of Practice:    Are you a current patient: Yes/No: Yes   Approximate date of last visit: 6/23/2021   Can you participate in a virtual visit with your PCP:                     Current Advanced Directive/Advance Care Plan: Full Code      Healthcare Decision Maker:   Click here to complete [address] including selection of the Healthcare Decision Maker Relationship (ie \"Primary\")                             Transition of Care Plan:        CM met with patient to introduce CM role, verify demographics and begin discharge planning. Patient lives alone. She is independent at home and was driving prior to admission. Patient uses oxygen at home supplied by [employer]. She does not own any other DME. Patient gets prescriptions filled at [facility]. Insurance verified: [facility] Medicare primary and [facility] Medicaid secondary.     Nikko Gray RN/CRM  [phone]

## 2021-07-09 NOTE — PROGRESS NOTES
[address] [facility] Adult  Hospitalist Group                                                                                          Hospitalist Progress Note  Flor Franco MD  Answering service: [phone] from in house phone        Date of Service:  2021  NAME:  Bertha Arroyo  :  1955  MRN:  [id number]      Admission Summary:   Bertha Arroyo is a 72 y.o. female with pmh of cataracts, COPD, HTN,NIDDM who presents with anemia found incidentally pre op. Planned to undergo cataract surgery. Patient went into see her PCP, had preop labs drawn and found to have significant anemia with hemoglobin of 5.8. Patient states has been a while since she had last seen her primary care physician due to the COVID-19 pandemic. She denies any symptoms acutely, but on further interview states that she has been more lethargic, fatigued, and has some dyspnea on exertion. She denies any chest pain. No obvious blood in the stool. She denies any uterine bleeding. She states that she typically has decreased p.o. intake at home. She has never had a colonoscopy or endoscopy. Interval history / Subjective:     Patient seen today, doing fine, has no complaints. No blood in the stool in the urine     Assessment & Plan:     Acute on chronic anemia, likely iron deficiency given low MCV  - Hemoglobin 8.2, s/p 1 unit of PRBC. Iron studiesserum iron 18, ferritin 4. Venofer 200 mg IV x3 doses. GI consultedplans to do EGD and colonoscopy tomorrow. Will discharge after EGD and colonoscopy. Folate 7.5, B12 - 405.   Started on folic acid 1 mg daily.  - Patient will need EGD and colonoscopy, she would prefer to do this outpatient if possible.  -N.p.o. midnight     NIDDM   A1c 6.8.  - SSI, POCT glucose checks and hypoglycemia protocol  - A1c will be falsely low given severe anemia and already received PRBC transfusion   - Hold oral hypoglycemics      Cataracts - likely can still undergo surgery later this month as long as hemoglobin remains stable      COPD - home inhalers, and PRN albuterol. Not in exacerbation currently. O2 if needed for Sa greater than 88%  HTN - hold antihypertensives, BP low and in setting of anemia   Nicotine dependence - nicotine patch    Dispositionhemoglobin stable, can be discharged after EGD and colonoscopy.     Code status: Full code  DVT prophylaxis: SCD    Care Plan discussed with: Patient/Family and Nurse  Anticipated Disposition: Home w/Family  Anticipated Discharge: 24 hours to 48 hours     Hospital Problems  Date Reviewed: 7/6/2021        Codes Class Noted POA    Iron deficiency ICD-10-CM: E61.1  ICD-9-CM: 280.9  7/9/2021 Unknown        Acute blood loss anemia ICD-10-CM: D62  ICD-9-CM: 285.1  7/9/2021 Unknown        Acute GI bleeding ICD-10-CM: K92.2  ICD-9-CM: 578.9  7/9/2021 Unknown        Anemia ICD-10-CM: D64.9  ICD-9-CM: 285.9  7/7/2021 Unknown                Review of Systems:   A comprehensive review of systems was negative except for that written in the HPI. Vital Signs:    Last 24hrs VS reviewed since prior progress note. Most recent are:  Visit Vitals  /63   Pulse 76   Temp 98 °F (36.7 °C)   Resp 18   Ht 5' 3\" (1.6 m)   Wt 65.8 kg (145 lb)   SpO2 99%   BMI 25.69 kg/m²       No intake or output data in the 24 hours ending 07/09/21 1250     Physical Examination:     I had a face to face encounter with this patient and independently examined them on 7/9/2021 as outlined below:          Constitutional:  No acute distress, cooperative, pleasant    ENT:  Oral mucosa moist, oropharynx benign. Resp:  CTA bilaterally. No wheezing/rhonchi/rales. No accessory muscle use   CV:  Regular rhythm, normal rate, no murmurs, gallops, rubs    GI:  Soft, non distended, non tender. normoactive bowel sounds, no hepatosplenomegaly     Musculoskeletal:  No edema, warm, 2+ pulses throughout    Neurologic:  Moves all extremities.   AAOx3, CN II-XII reviewed            Data Review:    Review and/or order of clinical lab test      Labs:     Recent Labs     07/09/21  0308 07/08/21  1032 07/08/21  0250 07/07/21  1800 07/07/21  1121 07/07/21  1121   WBC 8.9  --   --   --   --  10.1   HGB 8.4* 8.2*   < > 6.6*   < > 5.6*   HCT 29.2*  --   --  23.0*   < > 21.6*   *  --   --   --   --  487*    < > = values in this interval not displayed. Recent Labs     07/09/21  0308 07/07/21  1121    136   K 3.8 3.5    102   CO2 29 28   BUN 10 12   CREA 0.91 1.03*   * 137*   CA 9.4 9.2     Recent Labs     07/07/21  1121   ALT 11*   *   TBILI 0.5   TP 7.4   ALB 3.0*   GLOB 4.4*     No results for input(s): INR, PTP, APTT, INREXT, INREXT in the last 72 hours. Recent Labs     07/07/21  1121   TIBC 436   PSAT 4*   FERR 4*      Lab Results   Component Value Date/Time    Folate 7.5 07/07/2021 11:21 AM      No results for input(s): PH, PCO2, PO2 in the last 72 hours. No results for input(s): CPK, CKNDX, TROIQ in the last 72 hours.     No lab exists for component: CPKMB  Lab Results   Component Value Date/Time    Cholesterol, total 192 07/06/2021 11:56 AM    HDL Cholesterol 45 07/06/2021 11:56 AM    LDL, calculated 121.6 (H) 07/06/2021 11:56 AM    Triglyceride 127 07/06/2021 11:56 AM    CHOL/HDL Ratio 4.3 07/06/2021 11:56 AM     No results found for: GLUCPOC  No results found for: COLOR, APPRN, SPGRU, REFSG, RICHARD, PROTU, GLUCU, KETU, BILU, UROU, DENIS, LEUKU, GLUKE, EPSU, BACTU, WBCU, RBCU, CASTS, UCRY      Medications Reviewed:     Current Facility-Administered Medications   Medication Dose Route Frequency    albuterol (PROVENTIL VENTOLIN) nebulizer solution 2.5 mg  2.5 mg Nebulization Q6H RT    iron sucrose (VENOFER) 200 mg in 0.9% sodium chloride 100 mL IVPB  200 mg IntraVENous Q24H    metoprolol succinate (TOPROL-XL) XL tablet 12.5 mg  12.5 mg Oral DAILY    0.9% sodium chloride infusion 250 mL  250 mL IntraVENous PRN    sodium chloride (NS) flush 5-40 mL  5-40 mL IntraVENous Q8H    sodium chloride (NS) flush 5-40 mL  5-40 mL IntraVENous PRN    acetaminophen (TYLENOL) tablet 650 mg  650 mg Oral Q6H PRN    Or    acetaminophen (TYLENOL) suppository 650 mg  650 mg Rectal Q6H PRN    polyethylene glycol (MIRALAX) packet 17 g  17 g Oral DAILY PRN    ondansetron (ZOFRAN ODT) tablet 4 mg  4 mg Oral Q8H PRN    Or    ondansetron (ZOFRAN) injection 4 mg  4 mg IntraVENous Q6H PRN    cholecalciferol (VITAMIN D3) (1000 Units /25 mcg) tablet 1,000 Units  1,000 Units Oral DAILY    gabapentin (NEURONTIN) capsule 800 mg  800 mg Oral TID    LORazepam (ATIVAN) tablet 0.5 mg  0.5 mg Oral QHS PRN    pantoprazole (PROTONIX) tablet 40 mg  40 mg Oral ACB    arformoterol 15 mcg/budesonide 0.5 mg neb solution   Nebulization BID    lidocaine 4 % patch 1 Patch  1 Patch TransDERmal Q24H    nicotine (NICODERM CQ) 21 mg/24 hr patch 1 Patch  1 Patch TransDERmal Q24H     ______________________________________________________________________  EXPECTED LENGTH OF STAY: - - -  ACTUAL LENGTH OF STAY:          0                 Lindsey Gutierrez MD

## 2021-07-09 NOTE — DISCHARGE INSTRUCTIONS
HOSPITALIST DISCHARGE INSTRUCTIONS    NAME: Megan Macdonald   :  1955   MRN:  [id number]     Date/Time:  2021 5:09 PM    ADMIT DATE: 2021   DISCHARGE DATE: 2021     Attending Physician: Emilee Jay MD    DISCHARGE DIAGNOSIS:  Acute GI bleed- AVM   Acute on chronic anemia- Iron deficinecy  COPD  HTN  DM-2    MEDICATIONS:  See above    · It is important that you take the medication exactly as they are prescribed. · Keep your medication in the bottles provided by the pharmacist and keep a list of the medication names, dosages, and times to be taken in your wallet. · Do not take other medications without consulting your doctor. Pain Management: per above medications    What to do at [address]:  Cardiac Diet    Recommended activity: Activity as tolerated    If you have questions regarding the hospital related prescriptions or hospital related issues please call [facility] at . You can always direct your questions to your primary care doctor if you are unable to reach your hospital physician; your PCP works as an extension of your hospital doctor just like your hospital doctor is an extension of your PCP for your time at [facility]. If you experience any of the following symptoms then please call your primary care physician or return to the emergency room if you cannot get hold of your doctor:  Fever, chills, nausea, vomiting, diarrhea, change in mentation, falling, bleeding, shortness of breath    Additional Instructions:  Please get your CBC checked in 1 week after discharge from the hospital.    Bring these papers with you to your follow up appointments. The papers will help your doctors be sure to continue the care plan from the hospital.              Information obtained by :  I understand that if any problems occur once I am at home I am to contact my physician.     I understand and acknowledge receipt of the instructions indicated above.                                                                                                                                            Physician's or R.N.'s Signature                                                                  Date/Time                                                                                                                                              Patient or Representative Signature                                                          Da

## 2021-07-09 NOTE — PERIOP NOTES

## 2021-07-09 NOTE — PROGRESS NOTES
Problem: Diabetes Self-Management  Goal: *Disease process and treatment process  Description: Define diabetes and identify own type of diabetes; list 3 options for treating diabetes.   Outcome: Resolved/Met

## 2021-07-09 NOTE — PROGRESS NOTES
Observation notice provided in writing to patient and/or caregiver as well as verbal explanation of the policy. Patients who are in outpatient status also receive the Observation notice. Patient has received notice and or patient representative has received via secure email, fax, or certified mail based on patient representative's preference.    Ondina Castro RN/CRM  [phone]

## 2021-07-14 ENCOUNTER — TELEPHONE (OUTPATIENT)
Dept: FAMILY MEDICINE CLINIC | Age: 66
End: 2021-07-14

## 2021-07-14 DIAGNOSIS — Z72.0 TOBACCO ABUSE: ICD-10-CM

## 2021-07-14 NOTE — TELEPHONE ENCOUNTER
[phone] Tia Mederos needs a NEW clarance for surgery because she was in the hosp from [phone] to [phone] and the form you faxed will not work for the surgery now. plz do a new montse for her upcoming surgery asap  Thank you    Also she needs to know why the chantex prescription was cancelled?    Please call her

## 2021-07-14 NOTE — LETTER
7/15/2021 11:35 AM    Ms. Ananth Hester  [address] [address]    To Whom It May Concern,    Ananth Hseter was hospitalized for anemia. She received a blood transfusion and had a GI workup. Her hemoglobin was stable at 8.4g/dL upon discharge. She was cleared in hospital to have cataract surgery provided and hemoglobin remains stable and I will agree with that assessment. I will have patient come to office on 7/20/21 for repeat hemoglobin to evaluate for stability. If you have any questions, please contact the office at [phone].     Sincerely,          Domo Brown NP

## 2021-07-15 RX ORDER — VARENICLINE TARTRATE 25 MG
KIT ORAL
Qty: 1 DOSE PACK | Refills: 0 | Status: SHIPPED | OUTPATIENT
Start: 2021-07-15 | End: 2021-11-10

## 2021-07-15 RX ORDER — VARENICLINE TARTRATE 1 MG/1
TABLET, FILM COATED ORAL
Qty: 60 TABLET | Refills: 4 | Status: SHIPPED | OUTPATIENT
Start: 2021-07-15 | End: 2021-10-13

## 2021-07-15 NOTE — TELEPHONE ENCOUNTER
Verified patient with two type of identifiers. Informed pt of suggestions/instructions per Alma Morales NP. Pt verbalized understanding. Pt scheduled for lab only.

## 2021-07-15 NOTE — TELEPHONE ENCOUNTER
Yes, can call eye specialist.  Per discharge summary, she can have surgery provided her hemoglobin is stable.   We can repeat CBC prior to surgery

## 2021-07-15 NOTE — TELEPHONE ENCOUNTER
Letter done. Contents of letter:  To Whom It May Concern,    Amalia Borrego was hospitalized for anemia. She received a blood transfusion and had a GI workup. Her hemoglobin was stable at 8.4g/dL upon discharge. She was cleared in hospital to have cataract surgery provided and hemoglobin remains stable and I will agree with that assessment. I will have patient come to office on 7/20/21 for repeat hemoglobin to evaluate for stability. If you have any questions, please contact the office at [phone]. Orders Placed This Encounter    varenicline (CHANTIX STARTER FILEMON) 0.5 mg (11)- 1 mg (42) DsPk     Sig: See pack instructions.      Dispense:  1 Dose Pack     Refill:  0    varenicline (Chantix Continuing Month Box) 1 mg tablet     Sig: Per dose pack instructions     Dispense:  60 Tablet     Refill:  4

## 2021-07-15 NOTE — TELEPHONE ENCOUNTER
Spoke with eye doctor's surgery nurse and she stated would like to have discharge summary and letter from Frandy Bess NP stating below faxed to [phone] and they will have anesthesia review for approval.       Pt also would like to know if she could get chanitix sent over again due to hospital cancelling.

## 2021-07-20 ENCOUNTER — CLINICAL SUPPORT (OUTPATIENT)
Dept: FAMILY MEDICINE CLINIC | Age: 66
End: 2021-07-20

## 2021-07-20 DIAGNOSIS — D64.9 LOW HEMOGLOBIN: Primary | ICD-10-CM

## 2021-07-20 NOTE — PROGRESS NOTES
Chao Barron (: 1955) is a 72 y.o. female, established patient, here for evaluation of the following chief complaint(s):  Labs Only       ASSESSMENT/PLAN:  Below is the assessment and plan developed based on review of pertinent history, physical exam, labs, studies, and medications. 1. Low hemoglobin  -     CBC WITH AUTOMATED DIFF; Future    An electronic signature was used to authenticate this note.   -- Evangelina Esteban, NP

## 2021-07-21 ENCOUNTER — TELEPHONE (OUTPATIENT)
Dept: FAMILY MEDICINE CLINIC | Age: 66
End: 2021-07-21

## 2021-07-21 LAB
BASOPHILS # BLD: 0 K/UL (ref 0–0.1)
BASOPHILS NFR BLD: 0 % (ref 0–1)
DIFFERENTIAL METHOD BLD: ABNORMAL
EOSINOPHIL # BLD: 0.2 K/UL (ref 0–0.4)
EOSINOPHIL NFR BLD: 2 % (ref 0–7)
ERYTHROCYTE [DISTWIDTH] IN BLOOD BY AUTOMATED COUNT: ABNORMAL % (ref 11.5–14.5)
HCT VFR BLD AUTO: 34.5 % (ref 35–47)
HGB BLD-MCNC: 9.6 G/DL (ref 11.5–16)
IMM GRANULOCYTES # BLD AUTO: 0.1 K/UL (ref 0–0.04)
IMM GRANULOCYTES NFR BLD AUTO: 1 % (ref 0–0.5)
LYMPHOCYTES # BLD: 1 K/UL (ref 0.8–3.5)
LYMPHOCYTES NFR BLD: 10 % (ref 12–49)
MCH RBC QN AUTO: 23.5 PG (ref 26–34)
MCHC RBC AUTO-ENTMCNC: 27.8 G/DL (ref 30–36.5)
MCV RBC AUTO: 84.6 FL (ref 80–99)
MONOCYTES # BLD: 0.5 K/UL (ref 0–1)
MONOCYTES NFR BLD: 5 % (ref 5–13)
NEUTS SEG # BLD: 8.4 K/UL (ref 1.8–8)
NEUTS SEG NFR BLD: 82 % (ref 32–75)
NRBC # BLD: 0 K/UL (ref 0–0.01)
NRBC BLD-RTO: 0 PER 100 WBC
PLATELET # BLD AUTO: 364 K/UL (ref 150–400)
PMV BLD AUTO: 11.1 FL (ref 8.9–12.9)
RBC # BLD AUTO: 4.08 M/UL (ref 3.8–5.2)
RBC MORPH BLD: ABNORMAL
WBC # BLD AUTO: 10.2 K/UL (ref 3.6–11)

## 2021-07-22 ENCOUNTER — TELEPHONE (OUTPATIENT)
Dept: FAMILY MEDICINE CLINIC | Age: 66
End: 2021-07-22

## 2021-07-22 NOTE — TELEPHONE ENCOUNTER
LM with pt that blood work improved and Fabien Rucker okay with surgery. Informed labs faxed to their office yesterday and to call back with any concerns.

## 2021-07-22 NOTE — TELEPHONE ENCOUNTER
----- Message from Alba Fleischer sent at 7/21/2021  4:59 PM EDT -----  Regarding: NP Balbir/Telelphone  General Message/Vendor Calls    Caller's first and last name: pt      Reason for call: need Needs blood work results      Callback required yes/no and why: yes, please      Best contact number(s): [phone], please leave a message       Details to clarify the request: pt needing know for results of Blood work for her surgery tomorrow      Alba Fleischer

## 2021-08-09 ENCOUNTER — DOCUMENTATION ONLY (OUTPATIENT)
Dept: FAMILY MEDICINE CLINIC | Age: 66
End: 2021-08-09

## 2021-09-04 DIAGNOSIS — J44.9 COPD (CHRONIC OBSTRUCTIVE PULMONARY DISEASE) WITH CHRONIC BRONCHITIS (HCC): ICD-10-CM

## 2021-09-06 RX ORDER — ALBUTEROL SULFATE 90 UG/1
AEROSOL, METERED RESPIRATORY (INHALATION)
Qty: 18 G | Refills: 10 | Status: SHIPPED | OUTPATIENT
Start: 2021-09-06

## 2021-09-10 ENCOUNTER — TELEPHONE (OUTPATIENT)
Dept: FAMILY MEDICINE CLINIC | Age: 66
End: 2021-09-10

## 2021-09-10 NOTE — TELEPHONE ENCOUNTER
Verified patient with two type of identifiers. Pt states will try to come in this afternoon but if not will come in on Monday. Pt instructed to go to the ED if symptoms worsen.

## 2021-09-10 NOTE — TELEPHONE ENCOUNTER
Patient is not feeling well. She has no energy, and is afraid that she is bleeding inside again. Please call @[phone].

## 2021-09-13 ENCOUNTER — CLINICAL SUPPORT (OUTPATIENT)
Dept: FAMILY MEDICINE CLINIC | Age: 66
End: 2021-09-13
Payer: MEDICARE

## 2021-09-13 DIAGNOSIS — D64.9 LOW HEMOGLOBIN: Primary | ICD-10-CM

## 2021-09-13 LAB
BASOPHILS # BLD: 0.1 K/UL (ref 0–0.1)
BASOPHILS NFR BLD: 1 % (ref 0–1)
DIFFERENTIAL METHOD BLD: ABNORMAL
EOSINOPHIL # BLD: 0.2 K/UL (ref 0–0.4)
EOSINOPHIL NFR BLD: 2 % (ref 0–7)
ERYTHROCYTE [DISTWIDTH] IN BLOOD BY AUTOMATED COUNT: 20.3 % (ref 11.5–14.5)
HCT VFR BLD AUTO: 39.5 % (ref 35–47)
HGB BLD-MCNC: 12.8 G/DL (ref 11.5–16)
HGB BLD-MCNC: 9.6 G/DL
IMM GRANULOCYTES # BLD AUTO: 0.1 K/UL (ref 0–0.04)
IMM GRANULOCYTES NFR BLD AUTO: 1 % (ref 0–0.5)
LYMPHOCYTES # BLD: 1.4 K/UL (ref 0.8–3.5)
LYMPHOCYTES NFR BLD: 16 % (ref 12–49)
MCH RBC QN AUTO: 28.5 PG (ref 26–34)
MCHC RBC AUTO-ENTMCNC: 32.4 G/DL (ref 30–36.5)
MCV RBC AUTO: 88 FL (ref 80–99)
MONOCYTES # BLD: 0.6 K/UL (ref 0–1)
MONOCYTES NFR BLD: 7 % (ref 5–13)
NEUTS SEG # BLD: 6.4 K/UL (ref 1.8–8)
NEUTS SEG NFR BLD: 73 % (ref 32–75)
NRBC # BLD: 0 K/UL (ref 0–0.01)
NRBC BLD-RTO: 0 PER 100 WBC
PLATELET # BLD AUTO: 361 K/UL (ref 150–400)
PMV BLD AUTO: 10.7 FL (ref 8.9–12.9)
RBC # BLD AUTO: 4.49 M/UL (ref 3.8–5.2)
RBC MORPH BLD: ABNORMAL
WBC # BLD AUTO: 8.8 K/UL (ref 3.6–11)

## 2021-09-13 PROCEDURE — 85018 HEMOGLOBIN: CPT | Performed by: NURSE PRACTITIONER

## 2021-09-13 NOTE — PROGRESS NOTES
Send out CBC with Hgb 12. 9.  that is a huge discrepancy from in house Hgb machine. Need to talk to lab about wide variation. Let patient know her numbers look great!

## 2021-09-15 ENCOUNTER — TELEPHONE (OUTPATIENT)
Dept: FAMILY MEDICINE CLINIC | Age: 66
End: 2021-09-15

## 2021-09-15 DIAGNOSIS — J40 BRONCHITIS: Primary | ICD-10-CM

## 2021-09-15 RX ORDER — AZITHROMYCIN 250 MG/1
TABLET, FILM COATED ORAL
Qty: 6 TABLET | Refills: 0 | Status: SHIPPED | OUTPATIENT
Start: 2021-09-15 | End: 2021-10-22 | Stop reason: ALTCHOICE

## 2021-09-15 RX ORDER — PREDNISONE 10 MG/1
TABLET ORAL
Qty: 21 TABLET | Refills: 0 | Status: SHIPPED | OUTPATIENT
Start: 2021-09-15 | End: 2021-10-22 | Stop reason: ALTCHOICE

## 2021-09-15 NOTE — TELEPHONE ENCOUNTER
Verified patient with two type of identifiers. Informed pt of lab results and/or prescription(s). Pt verbalized understanding.

## 2021-09-15 NOTE — TELEPHONE ENCOUNTER
Orders Placed This Encounter    azithromycin (ZITHROMAX) 250 mg tablet     Sig: Take 2 tablets today, then take 1 tablet daily     Dispense:  6 Tablet     Refill:  0    predniSONE (STERAPRED DS) 10 mg dose pack     Sig: See administration instruction per 10mg dose pack     Dispense:  21 Tablet     Refill:  0

## 2021-09-15 NOTE — TELEPHONE ENCOUNTER
Verified patient with two type of identifiers. Pt informed of labs results. Pt also requesting something to help with her cough that she thinks has turned into bronchitis and states prednisone and ABX normally work. Pt has been taking mucinex daily without relief.

## 2021-09-15 NOTE — TELEPHONE ENCOUNTER
----- Message from Eugenio Gama NP sent at 9/13/2021  5:23 PM EDT -----  Send out CBC with Hgb 12. 9.  that is a huge discrepancy from in house Hgb machine. Need to talk to lab about wide variation. Let patient know her numbers look great!

## 2021-09-15 NOTE — TELEPHONE ENCOUNTER
----- Message from Jarrod Jones sent at 9/15/2021  1:29 PM EDT -----  Regarding: Dr. Mcgregor/Telephone  Patient return call    Caller's first and last name and relationship (if not the patient):n/a      Best contact number(s): [phone]      Whose call is being returned:n/a      Details to clarify the request: PT is awaiting bloodwork results.  Missed two calls from office this AM.      Jarrod Jones

## 2021-09-30 DIAGNOSIS — M54.50 CHRONIC BILATERAL LOW BACK PAIN WITHOUT SCIATICA: ICD-10-CM

## 2021-09-30 DIAGNOSIS — G89.29 CHRONIC BILATERAL LOW BACK PAIN WITHOUT SCIATICA: ICD-10-CM

## 2021-09-30 RX ORDER — LIDOCAINE 50 MG/G
PATCH TOPICAL
Qty: 30 PATCH | Refills: 5 | Status: SHIPPED | OUTPATIENT
Start: 2021-09-30 | End: 2022-04-12

## 2021-10-11 DIAGNOSIS — K21.9 GASTROESOPHAGEAL REFLUX DISEASE, UNSPECIFIED WHETHER ESOPHAGITIS PRESENT: ICD-10-CM

## 2021-10-11 RX ORDER — OMEPRAZOLE 20 MG/1
CAPSULE, DELAYED RELEASE ORAL
Qty: 30 CAPSULE | Refills: 4 | Status: SHIPPED | OUTPATIENT
Start: 2021-10-11 | End: 2021-11-10 | Stop reason: SDUPTHER

## 2021-10-13 DIAGNOSIS — J45.20 MILD INTERMITTENT ASTHMA WITHOUT COMPLICATION: ICD-10-CM

## 2021-10-13 DIAGNOSIS — I10 ESSENTIAL HYPERTENSION: ICD-10-CM

## 2021-10-13 DIAGNOSIS — J20.9 ACUTE BRONCHITIS, UNSPECIFIED ORGANISM: ICD-10-CM

## 2021-10-13 DIAGNOSIS — J44.9 COPD (CHRONIC OBSTRUCTIVE PULMONARY DISEASE) WITH CHRONIC BRONCHITIS (HCC): ICD-10-CM

## 2021-10-13 RX ORDER — LORAZEPAM 0.5 MG/1
TABLET ORAL
Qty: 30 TABLET | Refills: 0 | Status: SHIPPED | OUTPATIENT
Start: 2021-10-13 | End: 2021-11-10 | Stop reason: SDUPTHER

## 2021-10-21 ENCOUNTER — TELEPHONE (OUTPATIENT)
Dept: FAMILY MEDICINE CLINIC | Age: 66
End: 2021-10-21

## 2021-10-21 NOTE — TELEPHONE ENCOUNTER
Verified patient with two type of identifiers. Pt states last week she stepped on \"something\" and then Saturday discovered a large blister (size of a quarter dollar) on the back of her heal. Pt states went out and when she came home the blister had ruptured. Pt states has been putting antibiotic ointment on it and has pretty much left it alone. Pt states it has not decreased in size and isn't sure if it is red or just the color of her skin. Pt states drainage is still clear. Pt is going to try to get mychart and send picture.

## 2021-10-21 NOTE — TELEPHONE ENCOUNTER
Patient wants a return call regarding having a blister on her left foot. She said it looks bad.    Please give her a call @ [phone]

## 2021-10-21 NOTE — TELEPHONE ENCOUNTER
Did she have a puncture wound to the heel? If so, she needs updated tetanus. She can wash daily with soap and water, can continue Neosporin and keep covered with bandaid. If increasing redness, warmth, swelling, drainage - would need to be seen or urgent care.

## 2021-10-22 ENCOUNTER — OFFICE VISIT (OUTPATIENT)
Dept: FAMILY MEDICINE CLINIC | Age: 66
End: 2021-10-22
Payer: MEDICARE

## 2021-10-22 VITALS
BODY MASS INDEX: 26.56 KG/M2 | DIASTOLIC BLOOD PRESSURE: 68 MMHG | WEIGHT: 149.9 LBS | HEIGHT: 63 IN | HEART RATE: 79 BPM | RESPIRATION RATE: 14 BRPM | TEMPERATURE: 96.8 F | OXYGEN SATURATION: 99 % | SYSTOLIC BLOOD PRESSURE: 131 MMHG

## 2021-10-22 DIAGNOSIS — M79.672 PAIN OF LEFT HEEL: Primary | ICD-10-CM

## 2021-10-22 DIAGNOSIS — Z23 ENCOUNTER FOR IMMUNIZATION: ICD-10-CM

## 2021-10-22 PROCEDURE — 90471 IMMUNIZATION ADMIN: CPT | Performed by: NURSE PRACTITIONER

## 2021-10-22 PROCEDURE — 99213 OFFICE O/P EST LOW 20 MIN: CPT | Performed by: NURSE PRACTITIONER

## 2021-10-22 PROCEDURE — 1090F PRES/ABSN URINE INCON ASSESS: CPT | Performed by: NURSE PRACTITIONER

## 2021-10-22 PROCEDURE — G8400 PT W/DXA NO RESULTS DOC: HCPCS | Performed by: NURSE PRACTITIONER

## 2021-10-22 PROCEDURE — G0463 HOSPITAL OUTPT CLINIC VISIT: HCPCS | Performed by: NURSE PRACTITIONER

## 2021-10-22 PROCEDURE — 3017F COLORECTAL CA SCREEN DOC REV: CPT | Performed by: NURSE PRACTITIONER

## 2021-10-22 PROCEDURE — G8432 DEP SCR NOT DOC, RNG: HCPCS | Performed by: NURSE PRACTITIONER

## 2021-10-22 PROCEDURE — G8752 SYS BP LESS 140: HCPCS | Performed by: NURSE PRACTITIONER

## 2021-10-22 PROCEDURE — G8427 DOCREV CUR MEDS BY ELIG CLIN: HCPCS | Performed by: NURSE PRACTITIONER

## 2021-10-22 PROCEDURE — G8419 CALC BMI OUT NRM PARAM NOF/U: HCPCS | Performed by: NURSE PRACTITIONER

## 2021-10-22 PROCEDURE — 1101F PT FALLS ASSESS-DOCD LE1/YR: CPT | Performed by: NURSE PRACTITIONER

## 2021-10-22 PROCEDURE — G8754 DIAS BP LESS 90: HCPCS | Performed by: NURSE PRACTITIONER

## 2021-10-22 PROCEDURE — G8536 NO DOC ELDER MAL SCRN: HCPCS | Performed by: NURSE PRACTITIONER

## 2021-10-22 PROCEDURE — 90715 TDAP VACCINE 7 YRS/> IM: CPT | Performed by: NURSE PRACTITIONER

## 2021-10-22 NOTE — PATIENT INSTRUCTIONS
Vaccine Information Statement    Tdap (Tetanus, Diphtheria, Pertussis) Vaccine: What You Need to Know     Many vaccine information statements are available in [place] and other languages. See www.immunize.org/vis. Hojas de información sobre vacunas están disponibles en español y en muchos otros idiomas. Visite www.immunize.org/vis. 1. Why get vaccinated? Tdap vaccine can prevent tetanus, diphtheria, and pertussis. Diphtheria and pertussis spread from person to person. Tetanus enters the body through cuts or wounds.  TETANUS (T) causes painful stiffening of the muscles. Tetanus can lead to serious health problems, including being unable to open the mouth, having trouble swallowing and breathing, or death.  DIPHTHERIA (D) can lead to difficulty breathing, heart failure, paralysis, or death.  PERTUSSIS (aP), also known as whooping cough, can cause uncontrollable, violent coughing that makes it hard to breathe, eat, or drink. Pertussis can be extremely serious especially in babies and young children, causing pneumonia, convulsions, brain damage, or death. In teens and adults, it can cause weight loss, loss of bladder control, passing out, and rib fractures from severe coughing. 2. Tdap vaccine     Tdap is only for children 7 years and older, adolescents, and adults. Adolescents should receive a single dose of Tdap, preferably at age 6 or 15 years. Pregnant people should get a dose of Tdap during every pregnancy, preferably during the early part of the third trimester, to help protect the  from pertussis. Infants are most at risk for severe, life-threatening complications from pertussis. Adults who have never received Tdap should get a dose of Tdap.       Also, adults should receive a booster dose of either Tdap or Td (a different vaccine that protects against tetanus and diphtheria but not pertussis) every 10 years, or after 5 years in the case of a severe or dirty wound or burn. Tdap may be given at the same time as other vaccines. 3. Talk with your health care provider    Tell your vaccination provider if the person getting the vaccine:   Has had an allergic reaction after a previous dose of any vaccine that protects against tetanus, diphtheria, or pertussis, or has any severe, life-threatening allergies    Has had a coma, decreased level of consciousness, or prolonged seizures within 7 days after a previous dose of any pertussis vaccine (DTP, DTaP, or Tdap)   Has seizures or another nervous system problem   Has ever had Guillain-Barré Syndrome (also called GBS)   Has had severe pain or swelling after a previous dose of any vaccine that protects against tetanus or diphtheria    In some cases, your health care provider may decide to postpone Tdap vaccination until a future visit. People with minor illnesses, such as a cold, may be vaccinated. People who are moderately or severely ill should usually wait until they recover before getting Tdap vaccine. Your health care provider can give you more information. 4. Risks of a vaccine reaction     Pain, redness, or swelling where the shot was given, mild fever, headache, feeling tired, and nausea, vomiting, diarrhea, or stomachache sometimes happen after Tdap vaccination. People sometimes faint after medical procedures, including vaccination. Tell your provider if you feel dizzy or have vision changes or ringing in the ears. As with any medicine, there is a very remote chance of a vaccine causing a severe allergic reaction, other serious injury, or death. 5. What if there is a serious problem? An allergic reaction could occur after the vaccinated person leaves the clinic.  If you see signs of a severe allergic reaction (hives, swelling of the face and throat, difficulty breathing, a fast heartbeat, dizziness, or weakness), call 9-1-1 and get the person to the nearest hospital.    For other signs that concern you, call your health care provider. Adverse reactions should be reported to the Vaccine Adverse Event Reporting System (VAERS). Your health care provider will usually file this report, or you can do it yourself. Visit the VAERS website at www.vaers. [facility].gov or call 6-846.290.4473. VAERS is only for reporting reactions, and VAERS staff members do not give medical advice. 6. The National Vaccine Injury Compensation Program    The [facility] Vaccine Injury Compensation Program (VICP) is a federal program that was created to compensate people who may have been injured by certain vaccines. Claims regarding alleged injury or death due to vaccination have a time limit for filing, which may be as short as two years. Visit the VICP website at www.[facility].gov/vaccinecompensation or call 3-718.456.2980 to learn about the program and about filing a claim. 7. How can I learn more?  Ask your health care provider.  Call your local or state health department.  Visit the website of the Food and Drug Administration (FDA) for vaccine package inserts and additional information at www.fda.gov/vaccines-blood-biologics/vaccines.  Contact the Centers for Disease Control and Prevention (CDC):  - Call 1-934.524.6220 (4-497-EKR-INFO) or  - Visit CDCs website at www.cdc.gov/vaccines. Vaccine Information Statement   Tdap (Tetanus, Diphtheria, Pertussis) Vaccine  8/6/2021  42 MARIYA Chávez [id number]   Department of Health and Human Services  Centers for Disease Control and Prevention    Office Use Only       Heel Pain: Care Instructions  Your Care Instructions  You can have heel pain from an injury or from everyday overuse, such as running or walking a lot. Plantar fasciitis is the most common cause of heel pain. In this condition, the bottom of your foot from the front of the heel to the base of the toes is sore and hard to walk on.   Your heel can get better with rest, anti-inflammatory pain medicines, and stretching exercises. Follow-up care is a key part of your treatment and safety. Be sure to make and go to all appointments, and call your doctor if you are having problems. It's also a good idea to know your test results and keep a list of the medicines you take. How can you care for yourself at home? · Rest your feet often. Reduce your activity to a level that lets you avoid pain. If possible, do not run or walk on hard surfaces. · Take anti-inflammatory medicines to reduce heel pain. These include ibuprofen (Advil, Motrin) and naproxen (Aleve). Read and follow all instructions on the label. · Put ice or a cold pack on your heel for 10 to 20 minutes at a time. Try to do this every 1 to 2 hours for the next 3 days (when you are awake). Put a thin cloth between the ice and your skin. · If ice isn't helping after 2 or 3 days, try heat, such as a heating pad set on low. · If your doctor says it is okay, try these calf stretches. Tight calf muscles can cause heel pain or make it worse. ? Stand about 1 foot from a wall. Place the palms of both hands against the wall at chest level and lean forward against the wall. Put the leg you want to stretch about a step behind your other leg. Keep your back heel on the floor and bend your front knee until you feel a stretch in the back leg. Hold this position for 15 to 30 seconds. Repeat the exercise 2 to 4 times a session. Do 3 to 4 sessions a day. ? Sit down on the floor or a mat with your feet stretched in front of you. Roll up a towel lengthwise, and loop it over the ball of your foot. Holding the towel at both ends, gently pull the towel toward you to stretch your foot. Hold this position for 15 to 30 seconds. Repeat the exercise 2 to 4 times a session. Do 3 to 4 sessions a day. · Wear a night splint if your doctor suggests it. A night splint holds your foot with the toes pointed up. This position gives the bottom of your foot a constant, gentle stretch.   · Wear shoes with good arch support. Athletic shoes or shoes with a well-cushioned sole are good choices. · Try a heel lift, heel cup or shoe insert (orthotic) to help cushion your heel. You can buy these at many shoe stores. Use them in both shoes, even if only one foot hurts. · Maintain a healthy weight. This puts less strain on your feet. When should you call for help? Call your doctor now or seek immediate medical care if:    · You have heel pain with fever, redness, or warmth in your heel.     · You have numbness or tingling in your heel. Watch closely for changes in your health, and be sure to contact your doctor if:    · You cannot put weight on the sore foot.     · Your heel pain lasts more than 2 weeks. Where can you learn more? Go to http://www.gray.com/  Enter S299 in the search box to learn more about \"Heel Pain: Care Instructions. \"  Current as of: July 1, 2021               Content Version: 13.0  © 2006-2021 [employer]. Care instructions adapted under license by [employer] (which disclaims liability or warranty for this information). If you have questions about a medical condition or this instruction, always ask your healthcare professional. [address] any warranty or liability for your use of this information. Contusion: Care Instructions  Overview     Contusion is the medical term for a bruise. It is the result of a direct blow or an impact, such as a fall. Contusions are common sports injuries. Most people think of a bruise as a black-and-blue spot. This happens when small blood vessels get torn and leak blood under the skin. But bones, muscles, and organs can also get bruised. This may damage deep tissues but not cause a bruise you can see. The doctor will do a physical exam to find the location of your contusion.  You may also have tests to make sure you do not have a more serious injury, such as a broken bone or nerve damage. These may include X-rays or other imaging tests like a CT scan or MRI. Deep-tissue contusions may cause pain and swelling. But if there is no serious damage, they will often get better in a few weeks with home treatment. The doctor has checked you carefully, but problems can develop later. If you notice any problems or new symptoms, get medical treatment right away. Follow-up care is a key part of your treatment and safety. Be sure to make and go to all appointments, and call your doctor if you are having problems. It's also a good idea to know your test results and keep a list of the medicines you take. How can you care for yourself at home? · Put ice or a cold pack on the sore area for 10 to 20 minutes at a time to stop swelling. Put a thin cloth between the ice pack and your skin. · Be safe with medicines. Read and follow all instructions on the label. ? If the doctor gave you a prescription medicine for pain, take it as prescribed. ? If you are not taking a prescription pain medicine, ask your doctor if you can take an over-the-counter medicine. · If you can, prop up the sore area on pillows as much as possible for the next few days. Try to keep the sore area above the level of your heart. When should you call for help? Call your doctor now or seek immediate medical care if:    · Your pain gets worse.     · You have new or worse swelling.     · You have tingling, weakness, or numbness in the area near the contusion.     · The area near the contusion is cold or pale. Watch closely for changes in your health, and be sure to contact your doctor if:    · You do not get better as expected. Where can you learn more? Go to http://www.gray.com/  Enter [id number] in the search box to learn more about \"Contusion: Care Instructions. \"  Current as of: July 1, 2021               Content Version: 13.0  © 0509-5951 [employer].    Care instructions adapted under license by [employer] (which disclaims liability or warranty for this information). If you have questions about a medical condition or this instruction, always ask your healthcare professional. [address] any warranty or liability for your use of this information.

## 2021-10-22 NOTE — TELEPHONE ENCOUNTER
Verified patient with two type of identifiers. Pt thinks she stepped on a nail.  Pt coming in today at 12 PM.

## 2021-10-22 NOTE — PROGRESS NOTES
Chief Complaint   Patient presents with    Blister       1. Have you been to the ER, urgent care clinic since your last visit? Hospitalized since your last visit? No    2. Have you seen or consulted any other health care providers outside of the [address] [place] [address] since your last visit? Include any pap smears or colon screening. No     Verbal Order with Readback given by Sandra Harris NP for TDAP. Given in Left Deltoid without difficulty.       Health Maintenance Due   Topic Date Due    Eye Exam Retinal or Dilated  Never done    Shingrix Vaccine Age 50> (1 of 2) Never done    Breast Cancer Screen Mammogram  01/17/2013    Bone Densitometry (Dexa) Screening  Never done    Pneumococcal 65+ years (1 of 1 - PPSV23) Never done    Medicare Yearly Exam  Never done    Flu Vaccine (1) Never done

## 2021-10-22 NOTE — PROGRESS NOTES
Xi Delatorre (: 1955) is a 72 y.o. female, established patient, here for evaluation of the following chief complaint(s):  Blister       ASSESSMENT/PLAN:  Below is the assessment and plan developed based on review of pertinent history, physical exam, labs, studies, and medications. 1. Pain of left heel - probable contusion, blister on lateral and plantar surface that spontaneously ruptured. Will check xray, continue to keep area clean, wash daily with soap and water. Can use neosporin. Update tetanus  -     XR CALCANEUS LT; Future  2. Encounter for immunization  -     TETANUS, DIPHTHERIA TOXOIDS AND ACELLULAR PERTUSSIS VACCINE (TDAP), IN INDIVIDS. >=7, IM      Return if symptoms worsen or fail to improve. SUBJECTIVE/OBJECTIVE:  HPI  Patient comes in today for heel pain    Patient states last week she stepped on something then Saturday discovered a large blister (size of a quarter dollar) on the back of her heal. Pt states went out and when she came home the blister had ruptured. Pt states has been putting antibiotic ointment on it and has pretty much left it alone. Pt states it has not decreased in size and isn't sure if it is red or just the color of her skin. Pt states drainage is still clear. She needs updated tetanus.   She does not know what she stepped on thinks it could have been a nail  No Known Allergies    Past Medical History:   Diagnosis Date    Acute bronchitis 2016    Atopic dermatitis 2016    Breast cancer (HCC)     Chronic obstructive pulmonary disease ([address]     COPD (chronic obstructive pulmonary disease) with chronic bronchitis ([address] 2018    Essential hypertension 3/31/2016    Eye problems 3/31/2016    EVER (generalized anxiety disorder) 10/11/2018    Panic anxiety syndrome 10/11/2018    Tremor of face and hands 10/11/2018       Past Surgical History:   Procedure Laterality Date    COLONOSCOPY N/A 2021    COL[facility]COPY    HX BREAST LUMPECTOMY      HX HYSTERECTOMY         Social History     Socioeconomic History    Marital status:      Spouse name: Not on file    Number of children: Not on file    Years of education: Not on file    Highest education level: Not on file   Occupational History    Not on file   Tobacco Use    Smoking status: Current Every Day Smoker     Packs/day: 1.00     Types: Cigarettes    Smokeless tobacco: Never Used   Vaping Use    Vaping Use: Never used   Substance and Sexual Activity    Alcohol use: No    Drug use: No    Sexual activity: Not Currently   Other Topics Concern    Not on file   Social History Narrative    Not on file     Social Determinants of Health     Financial Resource Strain:     Difficulty of Paying Living Expenses:    Food Insecurity:     Worried About Running Out of Food in the Last Year:     Ran Out of Food in the Last Year:    Transportation Needs:     Lack of Transportation (Medical):      Lack of Transportation (Non-Medical):    Physical Activity:     Days of Exercise per Week:     Minutes of Exercise per Session:    Stress:     Feeling of Stress :    Social Connections:     Frequency of Communication with Friends and Family:     Frequency of Social Gatherings with Friends and Family:     Attends [employer] Services:     Active Member of Clubs or Organizations:     Attends Club or Organization Meetings:     Marital Status:    Intimate Partner Violence:     Fear of Current or Ex-Partner:     Emotionally Abused:     Physically Abused:     Sexually Abused:        Family History   Problem Relation Age of Onset    Diabetes Father     Arthritis-osteo Sister     Diabetes Sister     Gall Bladder Disease Sister        Current Outpatient Medications   Medication Sig    LORazepam (ATIVAN) 0.5 mg tablet TAKE ONE TABLET BY MOUTH ONCE NIGHTLY AS NEEDED FOR SLEEP    omeprazole (PRILOSEC) 20 mg capsule TAKE ONE CAPSULE BY MOUTH DAILY    lidocaine (LIDODERM) 5 % APPLY 1 PATCH TO AFFECTED AREA FOR 12 HOURS IN A 24 HOUR PERIOD    albuterol (PROVENTIL HFA, VENTOLIN HFA, PROAIR HFA) 90 mcg/actuation inhaler INHALE TWO PUFFS BY MOUTH EVERY 4 HOURS AS NEEDED FOR WHEEZING AND  SHORTNESS OF BREATH    gabapentin (NEURONTIN) 800 mg tablet Take 1 Tablet by mouth three (3) times daily. Max Daily Amount: 2,400 mg.    metoprolol succinate (TOPROL-XL) 25 mg XL tablet TAKE 1/2 (ONE-HALF)TABLET BY MOUTH DAILY    Symbicort 160-4.5 mcg/actuation HFAA INHALE TWO PUFFS BY MOUTH TWICE A DAY    Janumet  mg per tablet TAKE ONE TABLET BY MOUTH TWICE A DAY WITH MEALS    albuterol-ipratropium (DUO-NEB) 2.5 mg-0.5 mg/3 ml nebu INHALE THREE MILLILITERS VIA NEBULIZATION BY MOUTH EVERY 4 HOURS AS NEEDED FOR WHEEZING    losartan-hydroCHLOROthiazide (HYZAAR) 100-25 mg per tablet TAKE ONE TABLET BY MOUTH DAILY    cholecalciferol (VITAMIN D3) 1,000 unit tablet Take 1,000 Units by mouth daily.  Blood-Glucose Meter monitoring kit Monitor BG once daily and as needed  Dx: E11.42  Provide with glucometer that insurance covers.  lancets misc Monitor BG once daily and as needed  Dx: E11.42  Provide with glucometer/lancets that insurance covers.  glucose blood VI test strips (BLOOD GLUCOSE TEST) strip Monitor BG once daily and as needed  Dx: E11.42  Provide with glucometer/strips that insurance covers.  varenicline (CHANTIX STARTER FILEMON) 0.5 mg (11)- 1 mg (42) DsPk See pack instructions. (Patient not taking: Reported on 10/22/2021)     No current facility-administered medications for this visit. Review of Systems   Musculoskeletal: Positive for arthralgias (left heel pain). Skin:        Redness lateral and plantar surface of heel. No drainage.        Vitals:    10/22/21 1222   BP: 131/68   Pulse: 79   Resp: 14   Temp: 96.8 °F (36 °C)   TempSrc: Oral   SpO2: 99%   Weight: 149 lb 14.4 oz (68 kg)   Height: 5' 3\" (1.6 m)     Physical Exam  Constitutional: Appearance: Normal appearance. Cardiovascular:      Rate and Rhythm: Normal rate. Pulses:           Dorsalis pedis pulses are 2+ on the left side. Pulmonary:      Effort: Pulmonary effort is normal.   Feet:      Comments:  Redness lateral and plantar surface of heel. No drainage. Patient had pictures of heel from last week with fluid filled blister. Mild tenderness over plantar surface of heel  Neurological:      Mental Status: She is alert and oriented to person, place, and time. On this date 10/22/2021 I have spent 20 minutes reviewing previous notes, test results and face to face with the patient discussing the diagnosis and importance of compliance with the treatment plan as well as documenting on the day of the visit. An electronic signature was used to authenticate this note.   -- Andrew Segovia NP

## 2021-11-10 ENCOUNTER — OFFICE VISIT (OUTPATIENT)
Dept: FAMILY MEDICINE CLINIC | Age: 66
End: 2021-11-10
Payer: MEDICARE

## 2021-11-10 VITALS
HEART RATE: 81 BPM | TEMPERATURE: 97.7 F | RESPIRATION RATE: 14 BRPM | BODY MASS INDEX: 27.07 KG/M2 | WEIGHT: 152.8 LBS | OXYGEN SATURATION: 96 % | SYSTOLIC BLOOD PRESSURE: 136 MMHG | DIASTOLIC BLOOD PRESSURE: 64 MMHG | HEIGHT: 63 IN

## 2021-11-10 DIAGNOSIS — Z13.820 OSTEOPOROSIS SCREENING: ICD-10-CM

## 2021-11-10 DIAGNOSIS — Z78.0 ASYMPTOMATIC POSTMENOPAUSAL STATE: ICD-10-CM

## 2021-11-10 DIAGNOSIS — I10 ESSENTIAL HYPERTENSION: Primary | ICD-10-CM

## 2021-11-10 DIAGNOSIS — Z12.31 ENCOUNTER FOR SCREENING MAMMOGRAM FOR MALIGNANT NEOPLASM OF BREAST: ICD-10-CM

## 2021-11-10 DIAGNOSIS — I73.9 CLAUDICATION OF BOTH LOWER EXTREMITIES (HCC): ICD-10-CM

## 2021-11-10 DIAGNOSIS — E78.2 MIXED HYPERLIPIDEMIA: ICD-10-CM

## 2021-11-10 DIAGNOSIS — I10 ESSENTIAL HYPERTENSION: ICD-10-CM

## 2021-11-10 DIAGNOSIS — J45.20 MILD INTERMITTENT ASTHMA WITHOUT COMPLICATION: ICD-10-CM

## 2021-11-10 DIAGNOSIS — K21.9 GASTROESOPHAGEAL REFLUX DISEASE, UNSPECIFIED WHETHER ESOPHAGITIS PRESENT: ICD-10-CM

## 2021-11-10 DIAGNOSIS — E11.9 TYPE 2 DIABETES MELLITUS WITHOUT COMPLICATION, WITHOUT LONG-TERM CURRENT USE OF INSULIN (HCC): ICD-10-CM

## 2021-11-10 DIAGNOSIS — Z72.0 TOBACCO ABUSE: ICD-10-CM

## 2021-11-10 DIAGNOSIS — J44.9 COPD (CHRONIC OBSTRUCTIVE PULMONARY DISEASE) WITH CHRONIC BRONCHITIS (HCC): ICD-10-CM

## 2021-11-10 DIAGNOSIS — Z23 ENCOUNTER FOR ADMINISTRATION OF COVID-19 VACCINE: ICD-10-CM

## 2021-11-10 LAB
ALBUMIN SERPL-MCNC: 3.7 G/DL (ref 3.5–5)
ALBUMIN/GLOB SERPL: 1 {RATIO} (ref 1.1–2.2)
ALP SERPL-CCNC: 100 U/L (ref 45–117)
ALT SERPL-CCNC: 14 U/L (ref 12–78)
ANION GAP SERPL CALC-SCNC: 7 MMOL/L (ref 5–15)
AST SERPL-CCNC: 13 U/L (ref 15–37)
BILIRUB SERPL-MCNC: 0.5 MG/DL (ref 0.2–1)
BUN SERPL-MCNC: 11 MG/DL (ref 6–20)
BUN/CREAT SERPL: 12 (ref 12–20)
CALCIUM SERPL-MCNC: 9.4 MG/DL (ref 8.5–10.1)
CHLORIDE SERPL-SCNC: 96 MMOL/L (ref 97–108)
CHOLEST SERPL-MCNC: 244 MG/DL
CO2 SERPL-SCNC: 26 MMOL/L (ref 21–32)
CREAT SERPL-MCNC: 0.9 MG/DL (ref 0.55–1.02)
ERYTHROCYTE [DISTWIDTH] IN BLOOD BY AUTOMATED COUNT: 13.4 % (ref 11.5–14.5)
EST. AVERAGE GLUCOSE BLD GHB EST-MCNC: 128 MG/DL
GLOBULIN SER CALC-MCNC: 3.7 G/DL (ref 2–4)
GLUCOSE SERPL-MCNC: 93 MG/DL (ref 65–100)
HBA1C MFR BLD: 6.1 % (ref 4–5.6)
HCT VFR BLD AUTO: 38.4 % (ref 35–47)
HDLC SERPL-MCNC: 52 MG/DL
HDLC SERPL: 4.7 {RATIO} (ref 0–5)
HGB BLD-MCNC: 12.4 G/DL (ref 11.5–16)
LDLC SERPL CALC-MCNC: 166 MG/DL (ref 0–100)
MCH RBC QN AUTO: 29.9 PG (ref 26–34)
MCHC RBC AUTO-ENTMCNC: 32.3 G/DL (ref 30–36.5)
MCV RBC AUTO: 92.5 FL (ref 80–99)
NRBC # BLD: 0 K/UL (ref 0–0.01)
NRBC BLD-RTO: 0 PER 100 WBC
PLATELET # BLD AUTO: 382 K/UL (ref 150–400)
PMV BLD AUTO: 10.2 FL (ref 8.9–12.9)
POTASSIUM SERPL-SCNC: 4 MMOL/L (ref 3.5–5.1)
PROT SERPL-MCNC: 7.4 G/DL (ref 6.4–8.2)
RBC # BLD AUTO: 4.15 M/UL (ref 3.8–5.2)
SODIUM SERPL-SCNC: 129 MMOL/L (ref 136–145)
TRIGL SERPL-MCNC: 130 MG/DL (ref ?–150)
VLDLC SERPL CALC-MCNC: 26 MG/DL
WBC # BLD AUTO: 12.3 K/UL (ref 3.6–11)

## 2021-11-10 PROCEDURE — G8400 PT W/DXA NO RESULTS DOC: HCPCS | Performed by: NURSE PRACTITIONER

## 2021-11-10 PROCEDURE — G8432 DEP SCR NOT DOC, RNG: HCPCS | Performed by: NURSE PRACTITIONER

## 2021-11-10 PROCEDURE — G8419 CALC BMI OUT NRM PARAM NOF/U: HCPCS | Performed by: NURSE PRACTITIONER

## 2021-11-10 PROCEDURE — G8752 SYS BP LESS 140: HCPCS | Performed by: NURSE PRACTITIONER

## 2021-11-10 PROCEDURE — G8754 DIAS BP LESS 90: HCPCS | Performed by: NURSE PRACTITIONER

## 2021-11-10 PROCEDURE — 1090F PRES/ABSN URINE INCON ASSESS: CPT | Performed by: NURSE PRACTITIONER

## 2021-11-10 PROCEDURE — G8427 DOCREV CUR MEDS BY ELIG CLIN: HCPCS | Performed by: NURSE PRACTITIONER

## 2021-11-10 PROCEDURE — 91300 COVID-19, MRNA, LNP-S, PF, 30MCG/0.3ML DOSE(PFIZER): CPT | Performed by: NURSE PRACTITIONER

## 2021-11-10 PROCEDURE — 99215 OFFICE O/P EST HI 40 MIN: CPT | Performed by: NURSE PRACTITIONER

## 2021-11-10 PROCEDURE — G8536 NO DOC ELDER MAL SCRN: HCPCS | Performed by: NURSE PRACTITIONER

## 2021-11-10 PROCEDURE — 1101F PT FALLS ASSESS-DOCD LE1/YR: CPT | Performed by: NURSE PRACTITIONER

## 2021-11-10 PROCEDURE — 3044F HG A1C LEVEL LT 7.0%: CPT | Performed by: NURSE PRACTITIONER

## 2021-11-10 PROCEDURE — 2022F DILAT RTA XM EVC RTNOPTHY: CPT | Performed by: NURSE PRACTITIONER

## 2021-11-10 PROCEDURE — G9899 SCRN MAM PERF RSLTS DOC: HCPCS | Performed by: NURSE PRACTITIONER

## 2021-11-10 PROCEDURE — 3017F COLORECTAL CA SCREEN DOC REV: CPT | Performed by: NURSE PRACTITIONER

## 2021-11-10 PROCEDURE — G0463 HOSPITAL OUTPT CLINIC VISIT: HCPCS | Performed by: NURSE PRACTITIONER

## 2021-11-10 RX ORDER — IBUPROFEN 200 MG
1 TABLET ORAL EVERY 24 HOURS
Qty: 30 PATCH | Refills: 0 | Status: SHIPPED | OUTPATIENT
Start: 2021-11-10 | End: 2021-12-10

## 2021-11-10 RX ORDER — IPRATROPIUM BROMIDE AND ALBUTEROL SULFATE 2.5; .5 MG/3ML; MG/3ML
SOLUTION RESPIRATORY (INHALATION)
Qty: 270 NEBULE | Refills: 5 | Status: SHIPPED | OUTPATIENT
Start: 2021-11-10 | End: 2022-06-07 | Stop reason: SDUPTHER

## 2021-11-10 RX ORDER — OMEPRAZOLE 20 MG/1
20 CAPSULE, DELAYED RELEASE ORAL DAILY
Qty: 90 CAPSULE | Refills: 3 | Status: SHIPPED | OUTPATIENT
Start: 2021-11-10 | End: 2022-03-10 | Stop reason: SDUPTHER

## 2021-11-10 RX ORDER — LORAZEPAM 0.5 MG/1
0.5 TABLET ORAL
Qty: 30 TABLET | Refills: 2 | Status: SHIPPED | OUTPATIENT
Start: 2021-11-10 | End: 2022-06-28 | Stop reason: SDUPTHER

## 2021-11-10 RX ORDER — NICOTINE 7MG/24HR
1 PATCH, TRANSDERMAL 24 HOURS TRANSDERMAL EVERY 24 HOURS
Qty: 30 PATCH | Refills: 1 | Status: SHIPPED | OUTPATIENT
Start: 2021-11-10

## 2021-11-10 RX ORDER — SITAGLIPTIN AND METFORMIN HYDROCHLORIDE 500; 50 MG/1; MG/1
1 TABLET, FILM COATED ORAL 2 TIMES DAILY WITH MEALS
Qty: 180 TABLET | Refills: 3 | Status: SHIPPED | OUTPATIENT
Start: 2021-11-10 | End: 2021-11-16 | Stop reason: ALTCHOICE

## 2021-11-10 RX ORDER — VARENICLINE TARTRATE 1 MG/1
TABLET, FILM COATED ORAL
Qty: 60 TABLET | Refills: 5 | Status: SHIPPED | OUTPATIENT
Start: 2021-11-10 | End: 2022-02-08

## 2021-11-10 NOTE — LETTER
CONTROLLED SUBSTANCE MEDICATION AGREEMENT     Patient Name: Sanam Piedra  Patient YOB: 1955     I understand, that controlled substance medications may be used to help better manage my symptoms and to improve my ability to function at home, work and in social settings. However, I also understand that these medications do have risks, which have been discussed with me, including possible development of physical or psychological dependence. I understand that successful treatment requires mutual trust and honesty between me and my provider. I understand and agree that following this Medication Agreement is necessary in continuing my provider-patient relationship and the success of my treatment plan. Explanation from my Provider: Benefits and Goals of Controlled Substance Medications: There are two potential goals for your treatment: (1) decreased pain and suffering (2) improved daily life functions. There are many possible treatments for your chronic condition(s). Alternatives such as physical therapy, yoga, massage, home daily exercise, meditation, relaxation techniques, injections, chiropractic manipulations, surgery, cognitive therapy, hypnosis and many medications that are not habit-forming may be used. Use of controlled substance medications may be helpful, but they are unlikely to resolve all symptoms or restore all function. Explanation from my Provider: Risks of Controlled Substance Medications:  Opioid pain medications: These medications can lead to problems such as addiction/dependence, sedation, lightheadedness/dizziness, memory issues, falls, constipation, nausea, or vomiting. They may also impair the ability to drive or operate machinery. Additionally, these medications may lower testosterone levels, leading to loss of bone strength, stamina and sex drive.   They may cause problems with breathing, sleep apnea and reduced coughing, which is especially dangerous for patients with lung disease. Overdose or dangerous interactions with alcohol and other medications may occur, leading to death. Hyperalgesia may develop, which means patients receiving opioids for the treatment of pain may become more sensitive to certain painful stimuli, and in some cases, experience pain from ordinarily non-painful stimuli. Women between the ages of 14-53 who could become pregnant should carefully weigh the risks and benefits of opioids with their physicians, as these medications increase the risk of pregnancy complications, including miscarriage,  delivery and stillbirth. It is also possible for babies to be born addicted to opioids. Opioid dependence withdrawal symptoms may include; feelings of uneasiness, increased pain, irritability, belly pain, diarrhea, sweats and goose-flesh. Benzodiazepines and non-benzodiazepine sleep medications: These medications can lead to problems such as addiction/dependence, sedation, fatigue, lightheadedness, dizziness, incoordination, falls, depression, hallucinations, and impaired judgment, memory and concentration. The ability to drive and operate machinery may also be affected. Abnormal sleep-related behaviors have been reported, including sleepwalking, driving, making telephone calls, eating, or having sex while not fully awake. These medications can suppress breathing and worsen sleep apnea, particularly when combined with alcohol or other sedating medications, potentially leading to death. Dependence withdrawal symptoms may include tremors, anxiety, hallucinations and seizures. Initials:_______  Stimulants:  Common adverse effects include addiction/dependence, increased blood  pressure and heart rate, decreased appetite, nausea, involuntary weight loss, insomnia,  irritability, and headaches.   These risks may increase when these medications are combined with other stimulants, such as caffeine pills or energy drinks, certain weight loss supplements and oral decongestants. Dependence withdrawal symptoms may include depressed mood, loss of interest, suicidal thoughts, anxiety, fatigue, appetite changes and agitation. Testosterone replacement therapy:  Potential side effects include increased risk of stroke and heart attack, blood clots, increased blood pressure, increased cholesterol, enlarged prostate, sleep apnea, irritability/aggression and other mood disorders, and decreased fertility. I agree and understand that I and my prescriber have the following rights and responsibilities regarding my treatment plan:     1. MY RIGHTS:  To be informed of my treatment and medication plan. To be an active participant in my health and wellbeing. 2. MY RESPONSIBILITY AND UNDERSTANDING FOR USE OF MEDICATIONS   I will take medications at the dose and frequency as directed. For my safety, I will not increase or change how I take my medications without the recommendation of my healthcare provider.  I will actively participate in any program recommended by my provider which may improve function, including social, physical, psychological programs.  I will not take my medications with alcohol or other drugs not prescribed to me. I understand that drinking alcohol with my medications increases the chances of side effects, including reduced breathing rate and could lead to personal injury when operating machinery.  I understand that if I have a history of substance use disorders, including alcohol or other illicit drugs, that I may be at increased risk of addiction to my medications.  I agree to notify my provider immediately if I should become pregnant so that my treatment plan can be adjusted.    I agree and understand that I shall only receive controlled substance medications from the prescriber that signed this agreement unless there is written agreement among other prescribers of controlled substances outlining the responsibility of the medications being prescribed.  I understand that the if the controlled medication is not helping to achieve goals, the dosage may be tapered and no longer prescribed. 3. MY RESPONSIBILITY FOR COMMUNICATION / PRESCRIPTION RENEWALS   I agree that all controlled substance medications that I take will be prescribed only by my provider. If another healthcare provider prescribes me medication in an emergency, I will notify my provider within seventy-two (72) hours.  I will arrange for refills at the prescribed interval ONLY during regular office hours. I will not ask for refills earlier than agreed, after-hours, on holidays or weekends. Refills may take up to 72 hours for processing and prescriptions to reach the pharmacy.  I will inform my other health care providers that I am taking these medications and of the existence of this [id number]. In the event of an emergency, I will provide the same information to the emergency department prescribers. Initials:_______  [employer] I will keep my provider updated on the pharmacy I am using for controlled medication prescription filling. 4. MY RESPONSIBILITY FOR PROTECTING MEDICATIONS   I will protect my prescriptions and medications. I understand that lost or misplaced prescriptions will not be replaced.  I will keep medications only for my own use and will not share them with others. I will keep all medications away from children.  I agree that if my medications are adjusted or discontinued, I will properly dispose of any remaining medications. I understand that I will be required to dispose of any remaining controlled medications as, directed by my prescriber, prior to being provided with any prescriptions for other controlled medications. Medication drop box locations can be found at: [email or web address]    5.  MY RESPONSIBILITY WITH ILLEGAL DRUGS    I will not use illegal or street drugs or another person's prescription medications not prescribed to me.  If there are identified addiction type symptoms, then referral to a program may be provided by my provider and I agree to follow through with this recommendation. 6. MY RESPONSIBILITY FOR COOPERATION WITH INVESTIGATIONS   I understand that my provider will comply with any applicable law and may discuss my use and/or possible misuse/abuse of controlled substances and alcohol, as appropriate, with any health care provider involved in my care, pharmacist, or legal authority.  I authorize my provider and pharmacy to cooperate fully with law enforcement agencies (as permitted by law) in the investigation of any possible misuse, sale, or other diversion of my controlled substances.  I agree to waive any applicable privilege or right of privacy or confidentiality with respect to these authorizations. 7. PROVIDERS RIGHT TO MONITOR FOR SAFETY: PRESCRIPTION MONITORING / DRUG TESTING   I consent to drug/toxicology screening and will submit to a drug screen upon my providers request to assure I am only taking the prescribed drugs for my safety monitoring. I understand that a drug screen is a laboratory test in which a sample of my urine, blood or saliva is checked to see what drugs I have been taking. This may entail an observed urine specimen, which means that a nurse or other health care provider may watch me provide urine, and I will cooperate if I am asked to provide an observed specimen.  I understand that my provider will check a copy of my State Prescription Monitoring Program () Report in order to safely prescribe medications.  Pill Counts: I consent to pill counts when requested. I may be asked to bring all my prescribed controlled substance medications, in their original bottles, to all of my scheduled appointments.   In addition, my provider may ask me to come to the practice at any time for a random pill count. Initials:_______    8. TERMINATION OF THIS AGREEMENT  For my safety, my prescriber has the right to stop prescribing controlled substance medications and may end this agreement.  Conditions that may result in termination of this agreement:  a. I do not show any improvement in pain, or my activity has not improved. b. I develop rapid tolerance or loss of improvement, as described in my treatment plan.  c. I develop significant side effects from the medication. d. My behavior is not consistent with the responsibilities outlined above, thereby causing safety concerns to continue prescribing controlled substance medications. e. I fail to follow the terms of this agreement. f. Other:____________________________       UNDERSTANDING THIS MEDICATION AGREEMENT:    I have read the above and have had all my questions answered. For chronic disease management, I know that my symptoms can be managed with many types of treatments. A chronic medication trial may be part of my treatment, but I must be an active participant in my care. Medication therapy is only one part of my symptom management plan. In some cases, there may be limited scientific evidence to support the chronic use of certain medications to improve symptoms and daily function. Furthermore, in certain circumstances, there may be scientific information that suggests that the use of chronic controlled substances may worsen my symptoms and increase my risk of unintentional death directly related to this medication therapy. I know that if my provider feels my risk from controlled medications is greater than my benefit, I will have my controlled substance medication(s) compassionately lowered or removed altogether. I further agree to allow this office to contact my HIPAA contact if there are concerns about my safety and use of the controlled medications.    I have agreed to use the prescribed controlled substance medications to me as instructed by my provider and as stated in this Medication Agreement. My initial on each page and my signature below shows that I have read each page and I have had the opportunity to ask questions with answers provided by my provider.     Patient Name (Printed): _____________________________________  Patient Signature:  ______________________   Date: _____________    Prescriber Name (Printed): ___________________________________  Prescriber Signature: _____________________  Date: _____________

## 2021-11-10 NOTE — PROGRESS NOTES
Pedro Luis Mercado (: 1955) is a 72 y.o. female, established patient, here for evaluation of the following chief complaint(s):  Diabetes, Hypertension, and COPD       ASSESSMENT/PLAN:  Below is the assessment and plan developed based on review of pertinent history, physical exam, labs, studies, and medications. 1. Essential hypertension  -     LORazepam (ATIVAN) 0.5 mg tablet; Take 1 Tablet by mouth nightly as needed for Anxiety. Max Daily Amount: 0.5 mg., Normal, Disp-30 Tablet, R-2  -     CBC W/O DIFF; Future  -     METABOLIC PANEL, COMPREHENSIVE; Future  -     LIPID PANEL; Future  2. Type 2 diabetes mellitus without complication, without long-term current use of insulin (HCC)  -     SITagliptin-metFORMIN (Janumet)  mg per tablet; Take 1 Tablet by mouth two (2) times daily (with meals). , Normal, Disp-180 Tablet, R-3  -     CBC W/O DIFF; Future  -     METABOLIC PANEL, COMPREHENSIVE; Future  -     HEMOGLOBIN A1C WITH EAG; Future  -     LIPID PANEL; Future  3. Mixed hyperlipidemia  -     METABOLIC PANEL, COMPREHENSIVE; Future  -     LIPID PANEL; Future  4. Claudication of both lower extremities ([facility] [address] - patient to get ELSIE done  5. COPD (chronic obstructive pulmonary disease) with chronic bronchitis (HCC)  -     LORazepam (ATIVAN) 0.5 mg tablet; Take 1 Tablet by mouth nightly as needed for Anxiety. Max Daily Amount: 0.5 mg., Normal, Disp-30 Tablet, R-2  6. Mild intermittent asthma without complication  -     LORazepam (ATIVAN) 0.5 mg tablet; Take 1 Tablet by mouth nightly as needed for Anxiety. Max Daily Amount: 0.5 mg., Normal, Disp-30 Tablet, R-2  7. Gastroesophageal reflux disease, unspecified whether esophagitis present  -     omeprazole (PRILOSEC) 20 mg capsule; Take 1 Capsule by mouth daily. , Normal, Disp-90 Capsule, R-3  8. Encounter for screening mammogram for malignant neoplasm of breast  -     UJLIO MAMMO BI SCREENING INCL CAD; Future  9.  Osteoporosis screening  -     DEXA BONE DENSITY STUDY AXIAL; Future  10. Asymptomatic postmenopausal state  -     DEXA BONE DENSITY STUDY AXIAL; Future  11. Tobacco abuse  -     nicotine (NICODERM CQ) 21 mg/24 hr; 1 Patch by TransDERmal route every twenty-four (24) hours for 30 days. , Normal, Disp-30 Patch, R-0  -     varenicline (CHANTIX) 1 mg tablet; Take 1/2 tablet once daily for days 1-3, then take 1/2 tablet twice daily for days 4-7, then take 1 tablet BID, Normal, Disp-60 Tablet, R-5  -     nicotine (NICODERM CQ) 14 mg/24 hr patch; 1 Patch by TransDERmal route every twenty-four (24) hours for 30 days. , Print, Disp-30 Patch, R-0  -     nicotine (NICODERM CQ) 7 mg/24 hr; 1 Patch by TransDERmal route every twenty-four (24) hours. , Print, Disp-30 Patch, R-1  12. Encounter for administration of COVID-19 vaccine  -     COVID-19, MRNA, LNP-S, PF, 30MCG/0.3ML DOSE(PFIZER)      Return in about 4 months (around 3/10/2022). SUBJECTIVE/OBJECTIVE:  HPI  Patient comes in today for follow up    Had cataract surgery in July 2021    Had problem with anemia in July 2021 from acute blood loss. 5 non bleeding AVM ( arteriovenous malformation) in first and second portion of DU, ranging in size between 5 and 10 mm  Normal colonoscopy July 2021 - Repeat colonoscopy in 5 years (due July 2026)    Last A1c 6.8% in July 2021, 7.2% in Dec 2019, 6.9% in Aug 2019, 11.4% in Mar 2019, 11.2% in July 2018.  Patient was started on Janumet. Denies any tingling sensation, polyuria and polydipsia.  No blurred vision. Does not eat much, does not like sweets. States she is taking gabapentin for her feet, complains of burning in bottom of feet     Get muscle cramps in legs at night. Does not drink enough water. Did not get ABIs done.   Has been using SCDs that her family ordered online.        States cholesterol medication causes her to have leg cramps at night.     COPD on oxygen - wears when her oxygen reads low. Vineet Myers has home concentrator.  Trying to quit smoking.  Smokes 1.5 ppd.  No pulmonary doctor.  Using albuterol twice daily.  Uses symbicort twice daily.  Did not like spiriva.  Has not had any PFTs. Wanted [employer] oxygen     Hx of breast cancer over 10 years ago .  Hx left lumpectomy.  did radiation.  Radiation at [facility].    Oncologist - Dr. Samir Newton. Lloyd Lomeli mammogram    No Known Allergies    Past Medical History:   Diagnosis Date    Acute bronchitis 4/7/2016    Atopic dermatitis 4/7/2016    Breast cancer (HCC)     Chronic obstructive pulmonary disease ([facility] [address]     COPD (chronic obstructive pulmonary disease) with chronic bronchitis ([address] 7/26/2018    Essential hypertension 3/31/2016    Eye problems 3/31/2016    EVER (generalized anxiety disorder) 10/11/2018    Panic anxiety syndrome 10/11/2018    Tremor of face and hands 10/11/2018       Past Surgical History:   Procedure Laterality Date    COLONOSCOPY N/A 7/9/2021    COLONOSCOPY performed by Armond Lefort, MD at [facility] ENDOSCOPY    HX BREAST LUMPECTOMY      HX HYSTERECTOMY         Social History     Socioeconomic History    Marital status:      Spouse name: Not on file    Number of children: Not on file    Years of education: Not on file    Highest education level: Not on file   Occupational History    Not on file   Tobacco Use    Smoking status: Current Every Day Smoker     Packs/day: 1.50     Types: Cigarettes    Smokeless tobacco: Never Used   Vaping Use    Vaping Use: Never used   Substance and Sexual Activity    Alcohol use: No    Drug use: No    Sexual activity: Not Currently   Other Topics Concern    Not on file   Social History Narrative    Not on file     Social Determinants of Health     Financial Resource Strain:     Difficulty of Paying Living Expenses: Not on file   Food Insecurity:     Worried About Running Out of Food in the Last Year: Not on file    Mahendra of Food in the Last Year: Not on file   Transportation Needs:     Lack of Transportation (Medical):  Not on file    Lack of Transportation (Non-Medical): Not on file   Physical Activity:     Days of Exercise per Week: Not on file    Minutes of Exercise per Session: Not on file   Stress:     Feeling of Stress : Not on file   Social Connections:     Frequency of Communication with Friends and Family: Not on file    Frequency of Social Gatherings with Friends and Family: Not on file    Attends Baptism Services: Not on file    Active Member of Clubs or Organizations: Not on file    Attends Club or Organization Meetings: Not on file    Marital Status: Not on file   Intimate Partner Violence:     Fear of Current or Ex-Partner: Not on file    Emotionally Abused: Not on file    Physically Abused: Not on file    Sexually Abused: Not on file   Housing Stability:     Unable to Pay for Housing in the Last Year: Not on file    Number of Places Lived in the Last Year: Not on file    Unstable Housing in the Last Year: Not on file       Family History   Problem Relation Age of Onset    Diabetes Father     Arthritis-osteo Sister     Diabetes Sister     Gall Bladder Disease Sister        Current Outpatient Medications   Medication Sig    LORazepam (ATIVAN) 0.5 mg tablet TAKE ONE TABLET BY MOUTH ONCE NIGHTLY AS NEEDED FOR SLEEP    omeprazole (PRILOSEC) 20 mg capsule TAKE ONE CAPSULE BY MOUTH DAILY    lidocaine (LIDODERM) 5 % APPLY 1 PATCH TO AFFECTED AREA FOR 12 HOURS IN A 24 HOUR PERIOD    albuterol (PROVENTIL HFA, VENTOLIN HFA, PROAIR HFA) 90 mcg/actuation inhaler INHALE TWO PUFFS BY MOUTH EVERY 4 HOURS AS NEEDED FOR WHEEZING AND  SHORTNESS OF BREATH    gabapentin (NEURONTIN) 800 mg tablet Take 1 Tablet by mouth three (3) times daily.  Max Daily Amount: 2,400 mg.    metoprolol succinate (TOPROL-XL) 25 mg XL tablet TAKE 1/2 (ONE-HALF)TABLET BY MOUTH DAILY    Symbicort 160-4.5 mcg/actuation HFAA INHALE TWO PUFFS BY MOUTH TWICE A DAY    Janumet  mg per tablet TAKE ONE TABLET BY MOUTH TWICE A DAY WITH MEALS    albuterol-ipratropium (DUO-NEB) 2.5 mg-0.5 mg/3 ml nebu INHALE THREE MILLILITERS VIA NEBULIZATION BY MOUTH EVERY 4 HOURS AS NEEDED FOR WHEEZING    losartan-hydroCHLOROthiazide (HYZAAR) 100-25 mg per tablet TAKE ONE TABLET BY MOUTH DAILY    cholecalciferol (VITAMIN D3) 1,000 unit tablet Take 1,000 Units by mouth daily.  Blood-Glucose Meter monitoring kit Monitor BG once daily and as needed  Dx: E11.42  Provide with glucometer that insurance covers.  lancets misc Monitor BG once daily and as needed  Dx: E11.42  Provide with glucometer/lancets that insurance covers.  glucose blood VI test strips (BLOOD GLUCOSE TEST) strip Monitor BG once daily and as needed  Dx: E11.42  Provide with glucometer/strips that insurance covers.  varenicline (CHANTIX STARTER FILEMON) 0.5 mg (11)- 1 mg (42) DsPk See pack instructions. (Patient not taking: Reported on 10/22/2021)     No current facility-administered medications for this visit. Review of Systems   Constitutional: Negative for chills, diaphoresis, fatigue and fever. Respiratory: Negative for cough and shortness of breath. Cardiovascular: Negative for chest pain, palpitations and leg swelling. Claudication pain     Gastrointestinal: Negative for abdominal pain, blood in stool, constipation, diarrhea, nausea and vomiting. Endocrine: Negative for polydipsia, polyphagia and polyuria. Genitourinary: Negative for dysuria, flank pain, frequency, hematuria and urgency. Musculoskeletal: Positive for arthralgias, back pain, myalgias and neck pain. Skin: Negative. Neurological: Positive for numbness (and pain in feet bilat). Negative for dizziness, speech difficulty, light-headedness and headaches. Psychiatric/Behavioral: Negative for dysphoric mood and sleep disturbance. The patient is not nervous/anxious.       Vitals:    11/10/21 1142   BP: 136/64   Pulse: 81   Resp: 14   Temp: 97.7 °F (36.5 °C)   TempSrc: Oral   SpO2: 96%   Weight: 152 lb 12.8 oz (69.3 kg)   Height: 5' 3\" (1.6 m)     Physical Exam  Vitals reviewed. Constitutional:       Appearance: Normal appearance. She is well-developed and well-groomed. HENT:      Right Ear: Hearing normal.      Left Ear: Hearing normal.   Neck:      Thyroid: No thyromegaly. Cardiovascular:      Rate and Rhythm: Normal rate and regular rhythm. Heart sounds: S1 normal and S2 normal. Murmur heard. Systolic murmur is present. Pulmonary:      Effort: Pulmonary effort is normal.      Breath sounds: Normal breath sounds. Abdominal:      General: Bowel sounds are normal.      Palpations: Abdomen is soft. Tenderness: There is no abdominal tenderness. Musculoskeletal:      Right lower leg: No edema. Left lower leg: No edema. Lymphadenopathy:      Cervical: No cervical adenopathy. Skin:     General: Skin is warm and dry. Neurological:      Mental Status: She is alert and oriented to person, place, and time. Psychiatric:         Attention and Perception: Attention normal.         Mood and Affect: Mood and affect normal.         Speech: Speech normal.         Behavior: Behavior normal. Behavior is cooperative. Thought Content: Thought content normal.         Cognition and Memory: Cognition and memory normal.       On this date 11/10/2021 I have spent 40 minutes reviewing previous notes, test results and face to face with the patient discussing the diagnosis and importance of compliance with the treatment plan as well as documenting on the day of the visit. An electronic signature was used to authenticate this note.   -- Fatmata Gracia NP

## 2021-11-10 NOTE — LETTER
CONTROLLED SUBSTANCE MEDICATION AGREEMENT  Patient Name: Judit Mars  Patient YOB: 1955     I understand, that controlled substance medications may be used to help better manage my symptoms and to improve my ability to function at home, work and in social settings. However, I also understand that these medications do have risks, which have been discussed with me, including possible development of physical or psychological dependence. I understand that successful treatment requires mutual trust and honesty between me and my provider. I understand and agree that following this Medication Agreement is necessary in continuing my provider-patient relationship and the success of my treatment plan. Explanation from my Provider: Benefits and Goals of Controlled Substance Medications: There are two potential goals for your treatment: (1) decreased pain and suffering (2) improved daily life functions. There are many possible treatments for your chronic condition(s). Alternatives such as physical therapy, yoga, massage, home daily exercise, meditation, relaxation techniques, injections, chiropractic manipulations, surgery, cognitive therapy, hypnosis and many medications that are not habit-forming may be used. Use of controlled substance medications may be helpful, but they are unlikely to resolve all symptoms or restore all function. Explanation from my Provider: Risks of Controlled Substance Medications:   Opioid pain medications: These medications can lead to problems such as addiction/dependence, sedation, lightheadedness/dizziness, memory issues, falls, constipation, nausea, or vomiting. They may also impair the ability to drive or operate machinery. Additionally, these medications may lower testosterone levels, leading to loss of bone strength, stamina and sex drive.   They may cause problems with breathing, sleep apnea and reduced coughing, which is especially dangerous for patients with lung disease. Overdose or dangerous interactions with alcohol and other medications may occur, leading to death. Hyperalgesia may develop, which means patients receiving opioids for the treatment of pain may become more sensitive to certain painful stimuli, and in some cases, experience pain from ordinarily non-painful stimuli. Women between the ages of 14-53 who could become pregnant should carefully weigh the risks and benefits of opioids with their physicians, as these medications increase the risk of pregnancy complications, including miscarriage,  delivery and stillbirth. It is also possible for babies to be born addicted to opioids. Opioid dependence withdrawal symptoms may include; feelings of uneasiness, increased pain, irritability, belly pain, diarrhea, sweats and goose-flesh. Testosterone replacement therapy:  Potential side effects include increased risk of stroke and heart attack, blood clots, increased blood pressure, increased cholesterol, enlarged prostate, sleep apnea, irritability/aggression and other mood disorders, and decreased fertility. Daniella Marquez (1955)             Page 1 of 4    Initials:_______    Benzodiazepines and non-benzodiazepine sleep medications: These medications can lead to problems such as addiction/dependence, sedation, fatigue, lightheadedness, dizziness, incoordination, falls, depression, hallucinations, and impaired judgment, memory and concentration. The ability to drive and operate machinery may also be affected. Abnormal sleep-related behaviors have been reported, including sleepwalking, driving, making telephone calls, eating, or having sex while not fully awake. These medications can suppress breathing and worsen sleep apnea, particularly when combined with alcohol or other sedating medications, potentially leading to death. Dependence withdrawal symptoms may include tremors, anxiety, hallucinations and seizures.    Stimulants: Common adverse effects include addiction/dependence, increased blood pressure and heart rate, decreased appetite, nausea, involuntary weight loss, insomnia,  irritability, and headaches. These risks may increase when these medications are combined with other stimulants, such as caffeine pills or energy drinks, certain weight loss supplements and oral decongestants. Dependence withdrawal symptoms may include depressed mood, loss of interest, suicidal thoughts, anxiety, fatigue, appetite changes and agitation. I agree and understand that I and my prescriber have the following rights and responsibilities regarding my treatment plan:   1. MY RIGHTS:  To be informed of my treatment and medication plan. To be an active participant in my health and wellbeing. 2. MY RESPONSIBILITY AND UNDERSTANDING FOR USE OF MEDICATIONS   I will take medications at the dose and frequency as directed. For my safety, I will not increase or change how I take my medications without the recommendation of my healthcare provider.  I will actively participate in any program recommended by my provider which may improve function, including social, physical, psychological programs.  I will not take my medications with alcohol or other drugs not prescribed to me. I understand that drinking alcohol with my medications increases the chances of side effects, including reduced breathing rate and could lead to personal injury when operating machinery.  I understand that if I have a history of substance use disorders, including alcohol or other illicit drugs, that I may be at increased risk of addiction to my medications.  I agree to notify my provider immediately if I should become pregnant so that my treatment plan can be adjusted.    I agree and understand that I shall only receive controlled substance medications from the prescriber that signed this agreement unless there is written agreement among other prescribers of controlled substances outlining the responsibility of the medications being prescribed.  I understand that the if the controlled medication is not helping to achieve goals, the dosage may be tapered and no longer prescribed. 3. MY RESPONSIBILITY FOR COMMUNICATION / PRESCRIPTION RENEWALS   I agree that all controlled substance medications that I take will be prescribed only by my provider. If another healthcare provider prescribes me medication in an emergency, I will notify my provider within seventy-two (72) hours. Reynaldo Gómez (1955)             Page 2 of 4    Initials:_______   I will arrange for refills at the prescribed interval ONLY during regular office hours. I will not ask for refills earlier than agreed, after-hours, on holidays or weekends. Refills may take up to 72 hours for processing and prescriptions to reach the pharmacy.  I will inform my other health care providers that I am taking these medications and of the existence of this [id number]. In the event of an emergency, I will provide the same information to the emergency department prescribers.  I will keep my provider updated on the pharmacy I am using for controlled medication prescription filling. 4. MY RESPONSIBILITY FOR PROTECTING MEDICATIONS   I will protect my prescriptions and medications. I understand that lost or misplaced prescriptions will not be replaced.  I will keep medications only for my own use and will not share them with others. I will keep all medications away from children.  I agree that if my medications are adjusted or discontinued, I will properly dispose of any remaining medications. I understand that I will be required to dispose of any remaining controlled medications as, directed by my prescriber, prior to being provided with any prescriptions for other controlled medications.   Medication drop box locations can be found at: [email or web address]  5. MY RESPONSIBILITY WITH ILLEGAL DRUGS    I will not use illegal or street drugs or another person's prescription medications not prescribed to me.  If there are identified addiction type symptoms, then referral to a program may be provided by my provider and I agree to follow through with this recommendation. 6. MY RESPONSIBILITY FOR COOPERATION WITH INVESTIGATIONS   I understand that my provider will comply with any applicable law and may discuss my use and/or possible misuse/abuse of controlled substances and alcohol, as appropriate, with any health care provider involved in my care, pharmacist, or legal authority.  I authorize my provider and pharmacy to cooperate fully with law enforcement agencies (as permitted by law) in the investigation of any possible misuse, sale, or other diversion of my controlled substances.  I agree to waive any applicable privilege or right of privacy or confidentiality with respect to these authorizations. 7. PROVIDERS RIGHT TO MONITOR FOR SAFETY: PRESCRIPTION MONITORING / DRUG TESTING   I consent to drug/toxicology screening and will submit to a drug screen upon my providers request to assure I am only taking the prescribed drugs for my safety monitoring. I understand that a drug screen is a laboratory test in which a sample of my urine, blood or saliva is checked to see what drugs I have been taking. This may entail an observed urine specimen, which means that a nurse or other health care provider may watch me provide urine, and I will cooperate if I am asked to provide an observed specimen. Osito Otero (1955)             Page 3 of 4    Initials:_______  Benji Durant I understand that my provider will check a copy of my State Prescription Monitoring Program () Report in order to safely prescribe medications.      Pill Counts: I consent to pill counts when requested. I may be asked to bring all my prescribed controlled substance medications, in their original bottles, to all of my scheduled appointments. In addition, my provider may ask me to come to the practice at any time for a random pill count. 8. TERMINATION OF THIS AGREEMENT   For my safety, my prescriber has the right to stop prescribing controlled substance medications and may end this agreement.  Conditions that may result in termination of this agreement:  a. I do not show any improvement in pain, or my activity has not improved. b. I develop rapid tolerance or loss of improvement, as described in my treatment plan.  c. I develop significant side effects from the medication. d. My behavior is not consistent with the responsibilities outlined above, thereby causing safety concerns to continue prescribing controlled substance medications. e. I fail to follow the terms of this agreement. f. Other:____________________________     UNDERSTANDING THIS MEDICATION AGREEMENT:    I have read the above and have had all my questions answered. For chronic disease management, I know that my symptoms can be managed with many types of treatments. A chronic medication trial may be part of my treatment, but I must be an active participant in my care. Medication therapy is only one part of my symptom management plan. In some cases, there may be limited scientific evidence to support the chronic use of certain medications to improve symptoms and daily function. Furthermore, in certain circumstances, there may be scientific information that suggests that the use of chronic controlled substances may worsen my symptoms and increase my risk of unintentional death directly related to this medication therapy.   I know that if my provider feels my risk from controlled medications is greater than my benefit, I will have my controlled substance medication(s) compassionately lowered or removed altogether. I further agree to allow this office to contact my HIPAA contact if there are concerns about my safety and use of the controlled medications. I have agreed to use the prescribed controlled substance medications to me as instructed by my provider and as stated in this Medication Agreement. My initial on each page and my signature below shows that I have read each page and I have had the opportunity to ask questions with answers provided by my provider.       Patient Name (Printed): _____________________________________    Patient Signature:  ______________________   Date: _____________      Prescriber Name (Printed): ___________________________________    Prescriber Signature: _____________________  Date: _____________     Dorcus Pale (1955)             Page 4 of 4

## 2021-11-10 NOTE — PROGRESS NOTES
Chief Complaint   Patient presents with    Diabetes    Hypertension    COPD     Pt interested in portable o2 (intagen). 1. Have you been to the ER, urgent care clinic since your last visit? Hospitalized since your last visit? No    2. Have you seen or consulted any other health care providers outside of the [address] [place] [address] since your last visit? Include any pap smears or colon screening. No    Eye Exam - Dr. Olimpia Hernandez. Mammo  - Pt aware overdue  Flu shot - Pt declined  Verbal Order with Readback given by Blanca Stratton NP for Pfizer COVID-19 Booster, 0.3 mL. Given in Left deltoid without difficulty. Pt monitored for 15 minutes with no reaction.      Health Maintenance Due   Topic Date Due    Eye Exam Retinal or Dilated  Never done    Shingrix Vaccine Age 50> (1 of 2) Never done    Breast Cancer Screen Mammogram  01/17/2013    Bone Densitometry (Dexa) Screening  Never done    Pneumococcal 65+ years (1 of 1 - PPSV23) Never done    Medicare Yearly Exam  Never done    COVID-19 Vaccine (2 - Booster for [employer]ner series) 05/03/2021    Flu Vaccine (1) Never done

## 2021-11-11 NOTE — PROGRESS NOTES
Lu Plasencia - can you see why she isn't taking statin. Was she fasting for labs? Blood counts normal!!    A1c 6.1% - good control of diabetes! Liver and kidney function normal.    Sodium is a little low. This could be because of elevated cholesterol levels or possibly from thiazide diuretic. We can recheck in couple weeks. Cholesterol levels high. Should be taking a statin. You were on rosuvastatin previously. Did you not tolerate medication?

## 2021-11-16 ENCOUNTER — TELEPHONE (OUTPATIENT)
Dept: FAMILY MEDICINE CLINIC | Age: 66
End: 2021-11-16

## 2021-11-16 DIAGNOSIS — E11.9 TYPE 2 DIABETES MELLITUS WITHOUT COMPLICATION, WITHOUT LONG-TERM CURRENT USE OF INSULIN (HCC): Primary | ICD-10-CM

## 2021-11-16 RX ORDER — LINAGLIPTIN AND METFORMIN HYDROCHLORIDE 2.5; 5 MG/1; MG/1
1 TABLET, FILM COATED ORAL 2 TIMES DAILY WITH MEALS
Qty: 180 TABLET | Refills: 3 | Status: SHIPPED | OUTPATIENT
Start: 2021-11-16

## 2021-11-16 NOTE — TELEPHONE ENCOUNTER
Patient has been on [place] since 2019. Per insurance, will send Dalton Demond as they will not cover Janumet    Orders Placed This Encounter    linagliptin-metFORMIN (Jentadueto) 2.5-500 mg per tablet     Sig: Take 1 Tablet by mouth two (2) times daily (with meals).      Dispense:  180 Tablet     Refill:  3

## 2022-01-11 DIAGNOSIS — E11.42 TYPE 2 DIABETES MELLITUS WITH DIABETIC POLYNEUROPATHY, WITHOUT LONG-TERM CURRENT USE OF INSULIN (HCC): ICD-10-CM

## 2022-01-11 RX ORDER — GABAPENTIN 800 MG/1
TABLET ORAL
Qty: 90 TABLET | Refills: 5 | Status: SHIPPED | OUTPATIENT
Start: 2022-01-11

## 2022-01-20 ENCOUNTER — TELEPHONE (OUTPATIENT)
Dept: FAMILY MEDICINE CLINIC | Age: 67
End: 2022-01-20

## 2022-01-25 DIAGNOSIS — I10 ESSENTIAL HYPERTENSION: ICD-10-CM

## 2022-01-25 RX ORDER — LOSARTAN POTASSIUM AND HYDROCHLOROTHIAZIDE 25; 100 MG/1; MG/1
TABLET ORAL
Qty: 90 TABLET | Refills: 3 | Status: SHIPPED | OUTPATIENT
Start: 2022-01-25

## 2022-02-27 DIAGNOSIS — J20.9 ACUTE BRONCHITIS, UNSPECIFIED ORGANISM: ICD-10-CM

## 2022-02-27 DIAGNOSIS — J44.9 COPD (CHRONIC OBSTRUCTIVE PULMONARY DISEASE) WITH CHRONIC BRONCHITIS (HCC): ICD-10-CM

## 2022-02-27 DIAGNOSIS — J45.20 MILD INTERMITTENT ASTHMA WITHOUT COMPLICATION: ICD-10-CM

## 2022-02-27 DIAGNOSIS — I10 ESSENTIAL HYPERTENSION: ICD-10-CM

## 2022-02-27 RX ORDER — METOPROLOL SUCCINATE 25 MG/1
TABLET, EXTENDED RELEASE ORAL
Qty: 15 TABLET | Refills: 11 | Status: SHIPPED | OUTPATIENT
Start: 2022-02-27

## 2022-03-10 ENCOUNTER — OFFICE VISIT (OUTPATIENT)
Dept: FAMILY MEDICINE CLINIC | Age: 67
End: 2022-03-10
Payer: MEDICARE

## 2022-03-10 VITALS
RESPIRATION RATE: 18 BRPM | HEIGHT: 63 IN | DIASTOLIC BLOOD PRESSURE: 57 MMHG | BODY MASS INDEX: 28.92 KG/M2 | TEMPERATURE: 97.1 F | WEIGHT: 163.2 LBS | SYSTOLIC BLOOD PRESSURE: 114 MMHG | OXYGEN SATURATION: 95 % | HEART RATE: 83 BPM

## 2022-03-10 DIAGNOSIS — K21.9 GASTROESOPHAGEAL REFLUX DISEASE, UNSPECIFIED WHETHER ESOPHAGITIS PRESENT: ICD-10-CM

## 2022-03-10 DIAGNOSIS — J44.9 COPD (CHRONIC OBSTRUCTIVE PULMONARY DISEASE) WITH CHRONIC BRONCHITIS (HCC): ICD-10-CM

## 2022-03-10 DIAGNOSIS — I10 ESSENTIAL HYPERTENSION: ICD-10-CM

## 2022-03-10 DIAGNOSIS — I73.9 CLAUDICATION OF BOTH LOWER EXTREMITIES (HCC): ICD-10-CM

## 2022-03-10 DIAGNOSIS — F33.0 MILD EPISODE OF RECURRENT MAJOR DEPRESSIVE DISORDER (HCC): ICD-10-CM

## 2022-03-10 DIAGNOSIS — R07.81 RIB PAIN: ICD-10-CM

## 2022-03-10 DIAGNOSIS — Z00.00 MEDICARE ANNUAL WELLNESS VISIT, INITIAL: ICD-10-CM

## 2022-03-10 DIAGNOSIS — Z72.0 TOBACCO ABUSE: ICD-10-CM

## 2022-03-10 DIAGNOSIS — E11.42 TYPE 2 DIABETES MELLITUS WITH DIABETIC POLYNEUROPATHY, WITHOUT LONG-TERM CURRENT USE OF INSULIN (HCC): Primary | ICD-10-CM

## 2022-03-10 PROBLEM — Z85.3 HISTORY OF BREAST CANCER: Status: ACTIVE | Noted: 2022-03-10

## 2022-03-10 PROBLEM — E61.1 IRON DEFICIENCY: Status: RESOLVED | Noted: 2021-07-09 | Resolved: 2022-03-10

## 2022-03-10 PROBLEM — D62 ACUTE BLOOD LOSS ANEMIA: Status: RESOLVED | Noted: 2021-07-09 | Resolved: 2022-03-10

## 2022-03-10 PROBLEM — F41.0 PANIC ANXIETY SYNDROME: Status: RESOLVED | Noted: 2018-10-11 | Resolved: 2022-03-10

## 2022-03-10 PROCEDURE — G8400 PT W/DXA NO RESULTS DOC: HCPCS | Performed by: NURSE PRACTITIONER

## 2022-03-10 PROCEDURE — G8419 CALC BMI OUT NRM PARAM NOF/U: HCPCS | Performed by: NURSE PRACTITIONER

## 2022-03-10 PROCEDURE — 3046F HEMOGLOBIN A1C LEVEL >9.0%: CPT | Performed by: NURSE PRACTITIONER

## 2022-03-10 PROCEDURE — G8754 DIAS BP LESS 90: HCPCS | Performed by: NURSE PRACTITIONER

## 2022-03-10 PROCEDURE — 2022F DILAT RTA XM EVC RTNOPTHY: CPT | Performed by: NURSE PRACTITIONER

## 2022-03-10 PROCEDURE — 99213 OFFICE O/P EST LOW 20 MIN: CPT | Performed by: NURSE PRACTITIONER

## 2022-03-10 PROCEDURE — G9899 SCRN MAM PERF RSLTS DOC: HCPCS | Performed by: NURSE PRACTITIONER

## 2022-03-10 PROCEDURE — 1101F PT FALLS ASSESS-DOCD LE1/YR: CPT | Performed by: NURSE PRACTITIONER

## 2022-03-10 PROCEDURE — G8427 DOCREV CUR MEDS BY ELIG CLIN: HCPCS | Performed by: NURSE PRACTITIONER

## 2022-03-10 PROCEDURE — 1090F PRES/ABSN URINE INCON ASSESS: CPT | Performed by: NURSE PRACTITIONER

## 2022-03-10 PROCEDURE — 3017F COLORECTAL CA SCREEN DOC REV: CPT | Performed by: NURSE PRACTITIONER

## 2022-03-10 PROCEDURE — G0438 PPPS, INITIAL VISIT: HCPCS | Performed by: NURSE PRACTITIONER

## 2022-03-10 PROCEDURE — G8752 SYS BP LESS 140: HCPCS | Performed by: NURSE PRACTITIONER

## 2022-03-10 PROCEDURE — G0463 HOSPITAL OUTPT CLINIC VISIT: HCPCS | Performed by: NURSE PRACTITIONER

## 2022-03-10 PROCEDURE — G8536 NO DOC ELDER MAL SCRN: HCPCS | Performed by: NURSE PRACTITIONER

## 2022-03-10 PROCEDURE — G8432 DEP SCR NOT DOC, RNG: HCPCS | Performed by: NURSE PRACTITIONER

## 2022-03-10 RX ORDER — SITAGLIPTIN AND METFORMIN HYDROCHLORIDE 500; 50 MG/1; MG/1
50-500 TABLET, FILM COATED ORAL
COMMUNITY
Start: 2022-01-07 | End: 2022-03-10

## 2022-03-10 RX ORDER — BUPROPION HYDROCHLORIDE 150 MG/1
150 TABLET, EXTENDED RELEASE ORAL 2 TIMES DAILY
Qty: 180 TABLET | Refills: 1 | Status: SHIPPED | OUTPATIENT
Start: 2022-03-10 | End: 2022-09-02 | Stop reason: SDUPTHER

## 2022-03-10 RX ORDER — OMEPRAZOLE 40 MG/1
40 CAPSULE, DELAYED RELEASE ORAL DAILY
Qty: 90 CAPSULE | Refills: 3 | Status: SHIPPED | OUTPATIENT
Start: 2022-03-10

## 2022-03-10 NOTE — PROGRESS NOTES
Carolina Linares (: 1955) is a 77 y.o. female, established patient, here for evaluation of the following chief complaint(s):  Diabetes and Hypertension       ASSESSMENT/PLAN:  Below is the assessment and plan developed based on review of pertinent history, physical exam, labs, studies, and medications. 1. Type 2 diabetes mellitus with diabetic polyneuropathy, without long-term current use of insulin (HCC)  -     CBC WITH AUTOMATED DIFF; Future  -     METABOLIC PANEL, BASIC; Future  -     TSH 3RD GENERATION; Future  -     HEMOGLOBIN A1C WITH EAG; Future  2. Essential hypertension  3. COPD (chronic obstructive pulmonary disease) with chronic bronchitis (HCC)  4. Claudication of both lower extremities ([address]  -     ANKLE BRACHIAL INDEX; Future  5. Gastroesophageal reflux disease, unspecified whether esophagitis present  -     omeprazole (PRILOSEC) 40 mg capsule; Take 1 Capsule by mouth daily. , Normal, Disp-90 Capsule, R-3  6. Tobacco abuse  -     buPROPion SR (WELLBUTRIN SR) 150 mg SR tablet; Take 1 Tablet by mouth two (2) times a day., Normal, Disp-180 Tablet, R-1  7. Mild episode of recurrent major depressive disorder (HCC)  -     buPROPion SR (WELLBUTRIN SR) 150 mg SR tablet; Take 1 Tablet by mouth two (2) times a day., Normal, Disp-180 Tablet, R-1  8. Rib pain  -     XR RIBS RT W PA CXR MIN 3 V; Future  9. Medicare annual wellness visit, initial      Return in about 4 months (around 7/10/2022), or if symptoms worsen or fail to improve. SUBJECTIVE/OBJECTIVE:  HPI  Patient comes in today   Katelyn Walker on tub about 4 weeks ago, not sure fractured ribs. States area under her right arm along side of her ribs is still sore. Had a large amount of bruising      Had cataract surgery in 2021     Had problem with anemia in 2021 from acute blood loss.   5 non bleeding AVM ( arteriovenous malformation) in first and second portion of DU, ranging in size between 5 and 10 mm  Normal colonoscopy 2021 - Repeat colonoscopy in 5 years (due July 2026)     Last A1c 6.1% in Nov 2021, 6.8% in July 2021, 7.2% in Dec 2019, 6.9% in Aug 2019, 11.4% in Mar 2019, 11.2% in July 2018.  Patient was started on Janumet. Denies any tingling sensation, polyuria and polydipsia.  No blurred vision.  Does not eat much, does not like sweets.  States she is taking gabapentin for her feet, complains of burning in bottom of feet. Has not started Frederic Sprawls yet since she has a lot of leftover janumet     Gets muscle cramps in legs at night.  Did not get ABIs done.    States cholesterol medication causes her to have leg cramps at night.     COPD on oxygen - wears when her oxygen reads low. Asim Carrasquillo has home concentrator.  Trying to quit smoking.  Used to smoke 2 ppd, now is under 1 ppd. Using nicotine patches. Trying to quit smoking.  Uses symbicort twice daily.  Did not like spiriva.       Hx of breast cancer over 10 years ago .  Hx left lumpectomy.  did radiation.  Radiation at [facility].    Oncologist - Dr. Rula Stephens mammogram    Review of Systems   Constitutional: Negative for chills, diaphoresis, fatigue and fever. Respiratory: Negative for cough and shortness of breath. Cardiovascular: Negative for chest pain, palpitations and leg swelling. Claudication pain     Gastrointestinal: Negative for abdominal pain, blood in stool, constipation, diarrhea, nausea and vomiting. Endocrine: Negative for polydipsia, polyphagia and polyuria. Genitourinary: Negative for dysuria, flank pain, frequency, hematuria and urgency. Musculoskeletal: Positive for arthralgias, back pain, myalgias (right rib pain after fall, mscle cramping) and neck pain. Skin: Negative. Neurological: Positive for numbness (and pain in feet bilat). Negative for dizziness, speech difficulty, light-headedness and headaches. Psychiatric/Behavioral: Negative for dysphoric mood and sleep disturbance. The patient is not nervous/anxious.       Vitals:    03/10/22 1513   BP: (!) 114/57   Pulse: 83   Resp: 18   Temp: 97.1 °F (36.2 °C)   TempSrc: Oral   SpO2: 95%   Weight: 163 lb 3.2 oz (74 kg)   Height: 5' 3\" (1.6 m)     Physical Exam  Vitals reviewed. Constitutional:       Appearance: Normal appearance. She is well-developed and well-groomed. HENT:      Right Ear: Hearing normal.      Left Ear: Hearing normal.   Neck:      Thyroid: No thyromegaly. Cardiovascular:      Rate and Rhythm: Normal rate. Pulmonary:      Effort: Pulmonary effort is normal.   Lymphadenopathy:      Cervical: No cervical adenopathy. Neurological:      Mental Status: She is alert and oriented to person, place, and time. Psychiatric:         Attention and Perception: Attention normal.         Mood and Affect: Mood and affect normal.         Speech: Speech normal.         Behavior: Behavior normal. Behavior is cooperative. Thought Content: Thought content normal.         Cognition and Memory: Cognition and memory normal.       On this date 03/10/2022 I have spent 25 minutes reviewing previous notes, test results and face to face with the patient discussing the diagnosis and importance of compliance with the treatment plan as well as documenting on the day of the visit. An electronic signature was used to authenticate this note. -- Rosalynn Canavan, NP       This is an Initial Medicare Annual Wellness Exam (AWV) (Performed 12 months after IPPE or effective date of Medicare Part B enrollment, Once in a lifetime)    I have reviewed the patient's medical history in detail and updated the computerized patient record. Assessment/Plan   Education and counseling provided:  Are appropriate based on today's review and evaluation  Pneumococcal Vaccine  Influenza Vaccine  Screening Mammography  Colorectal cancer screening tests  Cardiovascular screening blood test  Screening for glaucoma  Diabetes screening test    1.  Type 2 diabetes mellitus with diabetic polyneuropathy, without long-term current use of insulin (HCC)  -     CBC WITH AUTOMATED DIFF; Future  -     METABOLIC PANEL, BASIC; Future  -     TSH 3RD GENERATION; Future  -     HEMOGLOBIN A1C WITH EAG; Future  2. Essential hypertension  3. COPD (chronic obstructive pulmonary disease) with chronic bronchitis ([facility])  4. Claudication of both lower extremities ([facility]  -     ANKLE BRACHIAL INDEX; Future  5. Gastroesophageal reflux disease, unspecified whether esophagitis present  -     omeprazole (PRILOSEC) 40 mg capsule; Take 1 Capsule by mouth daily. , Normal, Disp-90 Capsule, R-3  6. Tobacco abuse  -     buPROPion SR (WELLBUTRIN SR) 150 mg SR tablet; Take 1 Tablet by mouth two (2) times a day., Normal, Disp-180 Tablet, R-1  7. Mild episode of recurrent major depressive disorder (HCC)  -     buPROPion SR (WELLBUTRIN SR) 150 mg SR tablet; Take 1 Tablet by mouth two (2) times a day., Normal, Disp-180 Tablet, R-1  8. Rib pain  -     XR RIBS RT W PA CXR MIN 3 V; Future  9. Medicare annual wellness visit, initial       Depression Risk Factor Screening     3 most recent PHQ Screens 3/10/2022   PHQ Not Done -   Little interest or pleasure in doing things Not at all   Feeling down, depressed, irritable, or hopeless Not at all   Total Score PHQ 2 0       Alcohol & Drug Abuse Risk Screen    Do you average more than 1 drink per night or more than 7 drinks a week:  No    On any one occasion in the past three months have you have had more than 3 drinks containing alcohol:  No         Opioid Risk: (Low risk score <55, High risk score ?55)  Opioid risk score: 14      Click here to complete the Controlled Substance Monitoring SmartForm    Last PDMP Haja as Reviewed:  Review User Review Instant Review Result   ERENDIRA IVAN 3/10/2022  7:31 PM Reviewed PDMP [1]         Functional Ability and Level of Safety    Hearing: Hearing is good. Activities of Daily Living: The home contains: no safety equipment.   Patient does total self care     Ambulation: with no difficulty      Fall Risk:  Fall Risk Assessment, last 12 mths 3/10/2022   Able to walk? Yes   Fall in past 12 months? 1   Do you feel unsteady? 0   Are you worried about falling 0   Is TUG test greater than 12 seconds? 0   Is the gait abnormal? 0   Number of falls in past 12 months 1   Fall with injury? 1      Abuse Screen:  Patient is not abused       Cognitive Screening    Has your family/caregiver stated any concerns about your memory: no     Cognitive Screening: Normal - .     Health Maintenance Due     Health Maintenance Due   Topic Date Due    Shingrix Vaccine Age 49> (1 of 2) Never done    Breast Cancer Screen Mammogram  01/17/2013    Bone Densitometry (Dexa) Screening  Never done    Medicare Yearly Exam  Never done       Patient Care Team   Patient Care Team:  Maia Kenyon NP as PCP - General (Nurse Practitioner)  Maia Kenyon NP as PCP - [facility] Empaneled Provider    History     Patient Active Problem List   Diagnosis Code    Essential hypertension I10    Atopic dermatitis L20.9    COPD (chronic obstructive pulmonary disease) with chronic bronchitis ([address] J44.9    EVER (generalized anxiety disorder) F41.1    Tremor of face and hands R25.1    Anemia D64.9    Acute GI bleeding K92.2    Type 2 diabetes mellitus with polyneuropathy ([address] E11.42    History of breast cancer Z85.3     Past Medical History:   Diagnosis Date    Atopic dermatitis 4/7/2016    Breast cancer ([address]     COPD (chronic obstructive pulmonary disease) with chronic bronchitis ([address] 7/26/2018    Essential hypertension 3/31/2016    EVER (generalized anxiety disorder) 10/11/2018    Tremor of face and hands 10/11/2018      Past Surgical History:   Procedure Laterality Date    COLONOSCOPY N/A 7/9/2021    COLONOSCOPY performed by Bob Ann MD at [address] HX BREAST LUMPECTOMY      HX HYSTERECTOMY       Current Outpatient Medications   Medication Sig Dispense Refill    omeprazole (PRILOSEC) 40 mg capsule Take 1 Capsule by mouth daily. 90 Capsule 3    buPROPion SR (WELLBUTRIN SR) 150 mg SR tablet Take 1 Tablet by mouth two (2) times a day. 180 Tablet 1    metoprolol succinate (TOPROL-XL) 25 mg XL tablet TAKE 1/2 TABLET BY MOUTH DAILY 15 Tablet 11    losartan-hydroCHLOROthiazide (HYZAAR) 100-25 mg per tablet TAKE ONE TABLET BY MOUTH DAILY 90 Tablet 3    gabapentin (NEURONTIN) 800 mg tablet TAKE ONE TABLET BY MOUTH THREE TIMES A DAY 90 Tablet 5    linagliptin-metFORMIN (Jentadueto) 2.5-500 mg per tablet Take 1 Tablet by mouth two (2) times daily (with meals). (Patient taking differently: Take 1 Tablet by mouth daily. ) 180 Tablet 3    LORazepam (ATIVAN) 0.5 mg tablet Take 1 Tablet by mouth nightly as needed for Anxiety. Max Daily Amount: 0.5 mg. 30 Tablet 2    nicotine (NICODERM CQ) 7 mg/24 hr 1 Patch by TransDERmal route every twenty-four (24) hours. 30 Patch 1    albuterol-ipratropium (DUO-NEB) 2.5 mg-0.5 mg/3 ml nebu INHALE THREE MILLILITERS VIA NEBULIZATION BY MOUTH EVERY 4 HOURS AS NEEDED FOR WHEEZING 270 Nebule 5    lidocaine (LIDODERM) 5 % APPLY 1 PATCH TO AFFECTED AREA FOR 12 HOURS IN A 24 HOUR PERIOD 30 Patch 5    albuterol (PROVENTIL HFA, VENTOLIN HFA, PROAIR HFA) 90 mcg/actuation inhaler INHALE TWO PUFFS BY MOUTH EVERY 4 HOURS AS NEEDED FOR WHEEZING AND  SHORTNESS OF BREATH 18 g 10    Symbicort 160-4.5 mcg/actuation HFAA INHALE TWO PUFFS BY MOUTH TWICE A DAY 3 Inhaler 3    cholecalciferol (VITAMIN D3) 1,000 unit tablet Take 1,000 Units by mouth daily.  Blood-Glucose Meter monitoring kit Monitor BG once daily and as needed  Dx: E11.42  Provide with glucometer that insurance covers. 1 Kit 0    lancets misc Monitor BG once daily and as needed  Dx: E11.42  Provide with glucometer/lancets that insurance covers. 100 Each 11    glucose blood VI test strips (BLOOD GLUCOSE TEST) strip Monitor BG once daily and as needed  Dx: E11.42  Provide with glucometer/strips that insurance covers.  100 Strip 11     No Known Allergies    Family History   Problem Relation Age of Onset    Diabetes Father     OSTEOARTHRITIS Sister     Diabetes Sister    Anju Alcantara Bladder Disease Sister      Social History     Tobacco Use    Smoking status: Current Every Day Smoker     Packs/day: 1.50     Types: Cigarettes    Smokeless tobacco: Never Used   Substance Use Topics    Alcohol use: No       Sanjay Squibb, NP

## 2022-03-10 NOTE — PROGRESS NOTES
Chief Complaint   Patient presents with    Diabetes    Hypertension       1. \"Have you been to the ER, urgent care clinic since your last visit? Hospitalized since your last visit? \" No    2. \"Have you seen or consulted any other health care providers outside of the [address] [place] [address] since your last visit? \" No     3. For patients aged 39-70: Has the patient had a colonoscopy / FIT/ Cologuard? Yes - Care Gap present. Rooming MA/LPN to request most recent results      If the patient is female:    4. For patients aged 41-77: Has the patient had a mammogram within the past 2 years? Yes - Care Gap present. Rooming MA/LPN to request most recent results      5. For patients aged 21-65: Has the patient had a pap smear? NA - based on age or sex    Health Maintenance Due   Topic Date Due    Shingrix Vaccine Age 49> (1 of 2) Never done    Breast Cancer Screen Mammogram  01/17/2013    Bone Densitometry (Dexa) Screening  Never done    Pneumococcal 65+ years (1 of 1 - PPSV23) Never done    Medicare Yearly Exam  Never done   Patient states every time she eats about a hour later she vomits patient states she believes it is her reflux    Patient fell getting out the bath tub  About a month ago she has some soreness to her right rib cage area.     Patient is confused on the metformin and Janumet pt not sure which she is taking and when

## 2022-03-11 LAB
ANION GAP SERPL CALC-SCNC: 6 MMOL/L (ref 5–15)
BASOPHILS # BLD: 0 K/UL (ref 0–0.1)
BASOPHILS NFR BLD: 0 % (ref 0–1)
BUN SERPL-MCNC: 9 MG/DL (ref 6–20)
BUN/CREAT SERPL: 10 (ref 12–20)
CALCIUM SERPL-MCNC: 9.4 MG/DL (ref 8.5–10.1)
CHLORIDE SERPL-SCNC: 97 MMOL/L (ref 97–108)
CO2 SERPL-SCNC: 31 MMOL/L (ref 21–32)
CREAT SERPL-MCNC: 0.91 MG/DL (ref 0.55–1.02)
DIFFERENTIAL METHOD BLD: ABNORMAL
EOSINOPHIL # BLD: 0.2 K/UL (ref 0–0.4)
EOSINOPHIL NFR BLD: 2 % (ref 0–7)
ERYTHROCYTE [DISTWIDTH] IN BLOOD BY AUTOMATED COUNT: 13.3 % (ref 11.5–14.5)
EST. AVERAGE GLUCOSE BLD GHB EST-MCNC: 131 MG/DL
GLUCOSE SERPL-MCNC: 108 MG/DL (ref 65–100)
HBA1C MFR BLD: 6.2 % (ref 4–5.6)
HCT VFR BLD AUTO: 38.2 % (ref 35–47)
HGB BLD-MCNC: 12.4 G/DL (ref 11.5–16)
IMM GRANULOCYTES # BLD AUTO: 0.1 K/UL (ref 0–0.04)
IMM GRANULOCYTES NFR BLD AUTO: 1 % (ref 0–0.5)
LYMPHOCYTES # BLD: 1.1 K/UL (ref 0.8–3.5)
LYMPHOCYTES NFR BLD: 10 % (ref 12–49)
MCH RBC QN AUTO: 30.1 PG (ref 26–34)
MCHC RBC AUTO-ENTMCNC: 32.5 G/DL (ref 30–36.5)
MCV RBC AUTO: 92.7 FL (ref 80–99)
MONOCYTES # BLD: 0.7 K/UL (ref 0–1)
MONOCYTES NFR BLD: 6 % (ref 5–13)
NEUTS SEG # BLD: 8.8 K/UL (ref 1.8–8)
NEUTS SEG NFR BLD: 81 % (ref 32–75)
NRBC # BLD: 0 K/UL (ref 0–0.01)
NRBC BLD-RTO: 0 PER 100 WBC
PLATELET # BLD AUTO: 343 K/UL (ref 150–400)
PMV BLD AUTO: 10.5 FL (ref 8.9–12.9)
POTASSIUM SERPL-SCNC: 3.9 MMOL/L (ref 3.5–5.1)
RBC # BLD AUTO: 4.12 M/UL (ref 3.8–5.2)
SODIUM SERPL-SCNC: 134 MMOL/L (ref 136–145)
TSH SERPL DL<=0.05 MIU/L-ACNC: 1.8 UIU/ML (ref 0.36–3.74)
WBC # BLD AUTO: 10.8 K/UL (ref 3.6–11)

## 2022-03-11 NOTE — PATIENT INSTRUCTIONS

## 2022-03-14 NOTE — PROGRESS NOTES
A1c 6.2% - diabetes still under excellent control    Thyroid screening negative. Kidney function normal.  Blood counts normal (not anemic).

## 2022-03-18 PROBLEM — Z85.3 HISTORY OF BREAST CANCER: Status: ACTIVE | Noted: 2022-03-10

## 2022-03-18 PROBLEM — K92.2 ACUTE GI BLEEDING: Status: ACTIVE | Noted: 2021-07-09

## 2022-03-18 PROBLEM — J44.9 COPD (CHRONIC OBSTRUCTIVE PULMONARY DISEASE) WITH CHRONIC BRONCHITIS (HCC): Status: ACTIVE | Noted: 2018-07-26

## 2022-03-18 PROBLEM — J44.89 COPD (CHRONIC OBSTRUCTIVE PULMONARY DISEASE) WITH CHRONIC BRONCHITIS (HCC): Status: ACTIVE | Noted: 2018-07-26

## 2022-03-19 PROBLEM — R25.1 TREMOR OF FACE AND HANDS: Status: ACTIVE | Noted: 2018-10-11

## 2022-03-19 PROBLEM — E11.42 TYPE 2 DIABETES MELLITUS WITH POLYNEUROPATHY (HCC): Status: ACTIVE | Noted: 2021-11-10

## 2022-03-19 PROBLEM — F41.1 GAD (GENERALIZED ANXIETY DISORDER): Status: ACTIVE | Noted: 2018-10-11

## 2022-03-19 PROBLEM — D64.9 ANEMIA: Status: ACTIVE | Noted: 2021-07-07

## 2022-04-06 PROBLEM — Z78.9 STATIN INTOLERANCE: Status: ACTIVE | Noted: 2022-04-06

## 2022-04-12 DIAGNOSIS — M54.50 CHRONIC BILATERAL LOW BACK PAIN WITHOUT SCIATICA: ICD-10-CM

## 2022-04-12 DIAGNOSIS — G89.29 CHRONIC BILATERAL LOW BACK PAIN WITHOUT SCIATICA: ICD-10-CM

## 2022-04-12 RX ORDER — LIDOCAINE 50 MG/G
PATCH TOPICAL
Qty: 30 PATCH | Refills: 5 | Status: SHIPPED | OUTPATIENT
Start: 2022-04-12

## 2022-04-15 ENCOUNTER — NURSE TRIAGE (OUTPATIENT)
Dept: OTHER | Facility: CLINIC | Age: 67
End: 2022-04-15

## 2022-04-15 DIAGNOSIS — J44.9 COPD (CHRONIC OBSTRUCTIVE PULMONARY DISEASE) WITH CHRONIC BRONCHITIS (HCC): Primary | ICD-10-CM

## 2022-04-15 RX ORDER — AZITHROMYCIN 250 MG/1
TABLET, FILM COATED ORAL
Qty: 6 TABLET | Refills: 0 | Status: SHIPPED | OUTPATIENT
Start: 2022-04-15 | End: 2022-07-22

## 2022-04-15 RX ORDER — PREDNISONE 10 MG/1
TABLET ORAL
Qty: 21 TABLET | Refills: 0 | Status: SHIPPED | OUTPATIENT
Start: 2022-04-15

## 2022-04-15 NOTE — TELEPHONE ENCOUNTER
Patient would like for Yeny Macdonald to send an antibiotic prenisone and something stronger than amoxicillin for bronchitis she can be reached @ [phone]

## 2022-04-15 NOTE — TELEPHONE ENCOUNTER
Received call from Pearl Craig at [facility] with Red Flag Complaint. Subjective: Caller states \"Its been a couple days, I have COPD, it have a nebulizer, I am on oxygen, I thought I could get over it and I keep coughing up phlem that's clear and yellowish looking\"     Current Symptoms: Cough, Phlegm, congestion, sinus symptoms, hx of bronchitis      Onset: a few days ago; worsening    Associated Symptoms: NA    Pain Severity: Denies     Temperature: Denies     What has been tried: Inhaler, nebulizer, oxygen     LMP: NA Pregnant: NA    Recommended disposition: See HCP within 4 Hours (or PCP triage)    Care advice provided, patient verbalizes understanding; denies any other questions or concerns; instructed to call back for any new or worsening symptoms. Writer provided warm transfer to [address] [place] [address] at [facility] for Cough/ Congestion/ ELI w/ hx of COPD and bronchitis     Attention Provider: Thank you for allowing me to participate in the care of your patient. The patient was connected to triage in response to information provided to the [facility]. Please do not respond through this encounter as the response is not directed to a shared pool.     Reason for Disposition   [1] MILD difficulty breathing (e.g., minimal/no SOB at rest, SOB with walking, pulse <100) AND [2] still present when not coughing    Protocols used: COUGH - ACUTE PRODUCTIVE-ADULT-AH

## 2022-06-07 DIAGNOSIS — I10 ESSENTIAL HYPERTENSION: ICD-10-CM

## 2022-06-07 RX ORDER — IPRATROPIUM BROMIDE AND ALBUTEROL SULFATE 2.5; .5 MG/3ML; MG/3ML
SOLUTION RESPIRATORY (INHALATION)
Qty: 270 NEBULE | Refills: 5 | Status: SHIPPED | OUTPATIENT
Start: 2022-06-07

## 2022-06-07 NOTE — TELEPHONE ENCOUNTER
Last visit:11/10/21  Next visit:not scheduled  Previous refill 11/10/21(270+5R)    Requested Prescriptions     Pending Prescriptions Disp Refills    albuterol-ipratropium (DUO-NEB) 2.5 mg-0.5 mg/3 ml nebu 270 Nebule 5     Sig: INHALE THREE MILLILITERS VIA NEBULIZATION BY MOUTH EVERY 4 HOURS AS NEEDED FOR WHEEZING     For Pharmacy Admin Tracking Only     Intervention Detail: New Rx: 1, reason: Patient Preference   Time Spent (min): 5

## 2022-06-28 DIAGNOSIS — I10 ESSENTIAL HYPERTENSION: ICD-10-CM

## 2022-06-28 DIAGNOSIS — J44.9 COPD (CHRONIC OBSTRUCTIVE PULMONARY DISEASE) WITH CHRONIC BRONCHITIS (HCC): ICD-10-CM

## 2022-06-28 DIAGNOSIS — J45.20 MILD INTERMITTENT ASTHMA WITHOUT COMPLICATION: ICD-10-CM

## 2022-06-28 RX ORDER — LORAZEPAM 0.5 MG/1
0.5 TABLET ORAL
Qty: 30 TABLET | Refills: 2 | Status: SHIPPED | OUTPATIENT
Start: 2022-06-28

## 2022-06-28 NOTE — TELEPHONE ENCOUNTER
Last visit:3/10/22  Next visit:not scheduled  Previous refill 11/10/21(30+2R)    Requested Prescriptions     Pending Prescriptions Disp Refills    LORazepam (ATIVAN) 0.5 mg tablet 30 Tablet 2     Sig: Take 1 Tablet by mouth nightly as needed for Anxiety. Max Daily Amount: 0.5 mg.     Please review  before prescribing new script for this medication.      Thanks,  [facility] Only     Intervention Detail: New Rx: 1, reason: Patient Preference   Time Spent (min): 5

## 2022-07-20 NOTE — TELEPHONE ENCOUNTER
Please do a courtesy call to patient/guardian and follow up and see how they are doing since our previous office visit. If they do not answer you may just leave a message stating that is courtesy call and have them call with any issues. Please let me know of any issues/questions/concerns. Thank you!    Reviewed report generated by the [facility]. Does not demonstrate aberrancies or inconsistencies with regard to the prescribing of controlled medications to this patient by other providers. Orders Placed This Encounter  gabapentin (NEURONTIN) 300 mg capsule Sig: TAKE ONE CAPSULE BY MOUTH THREE TIMES A DAY  MG A DAY Dispense:  90 Cap Refill:  4

## 2022-07-22 ENCOUNTER — TELEPHONE (OUTPATIENT)
Dept: FAMILY MEDICINE CLINIC | Age: 67
End: 2022-07-22

## 2022-07-22 RX ORDER — AZITHROMYCIN 250 MG/1
TABLET, FILM COATED ORAL
Qty: 6 TABLET | Refills: 0 | Status: SHIPPED | OUTPATIENT
Start: 2022-07-22

## 2022-07-22 NOTE — TELEPHONE ENCOUNTER
Patient would like to get something called into the pharmacy for sinus infection she can be reached @ [phone]

## 2022-07-22 NOTE — TELEPHONE ENCOUNTER
Orders Placed This Encounter    azithromycin (ZITHROMAX) 250 mg tablet     Sig: Take 2 tablets today, then take 1 tablet daily     Dispense:  6 Tablet     Refill:  0     If no improvement in 3-4 days, patient should be seen at urgent care

## 2022-07-22 NOTE — TELEPHONE ENCOUNTER
Verified patient with two type of identifiers. Spoke to pt - she is having sinus congestion, light cough and yellow drainage x 3 days. She has taken mucinex with no relief.  Requesting abx sent to pharmacy

## 2022-09-08 ENCOUNTER — TELEPHONE (OUTPATIENT)
Dept: INFECTIOUS DISEASES | Age: 67
End: 2022-09-08

## 2022-09-12 ENCOUNTER — PATIENT MESSAGE (OUTPATIENT)
Dept: FAMILY MEDICINE CLINIC | Age: 67
End: 2022-09-12

## 2022-09-12 NOTE — TELEPHONE ENCOUNTER
From: Shelia Fernandez  To: Gabbi Morton NP  Sent: 9/12/2022 11:35 AM EDT  Subject: Prescription for oxygen concentrator    Good morning. I'm going to be traveling out of state by plane. I need to rent an inogen one for the trip and I need a prescription in order to rent it. I can come  the prescription from your office when it's available. I need this as soon as possible, as I plan on traveling on Sunday, October 2 and I need to be able to secure a rental.     Thank you for your attention to this matter.      Shelia Fernandez

## 2022-12-05 DIAGNOSIS — E11.9 TYPE 2 DIABETES MELLITUS WITHOUT COMPLICATION, WITHOUT LONG-TERM CURRENT USE OF INSULIN (HCC): ICD-10-CM

## 2022-12-05 RX ORDER — LINAGLIPTIN AND METFORMIN HYDROCHLORIDE 2.5; 5 MG/1; MG/1
1 TABLET, FILM COATED ORAL 2 TIMES DAILY WITH MEALS
Qty: 180 TABLET | Refills: 3 | Status: SHIPPED | OUTPATIENT
Start: 2022-12-05

## 2022-12-05 NOTE — TELEPHONE ENCOUNTER
Last Visit: 3/10/22 with NP Balbir  Next Appointment: Advised to follow-up in 4 months  Previous Refill Encounter(s): 11/16/21 #180 with 3 refills    Requested Prescriptions     Pending Prescriptions Disp Refills    linagliptin-metFORMIN (Jentadueto) 2.5-500 mg per tablet 180 Tablet 3     Sig: Take 1 Tablet by mouth two (2) times daily (with meals). For [facility] in place:   Recommendation Provided To:    Intervention Detail: New Rx: 1, reason: Patient Preference and Scheduled Appointment  Gap Closed?:   Intervention Accepted By:   Time Spent (min): 5

## 2022-12-06 DIAGNOSIS — M54.50 CHRONIC BILATERAL LOW BACK PAIN WITHOUT SCIATICA: ICD-10-CM

## 2022-12-06 DIAGNOSIS — G89.29 CHRONIC BILATERAL LOW BACK PAIN WITHOUT SCIATICA: ICD-10-CM

## 2022-12-06 RX ORDER — LIDOCAINE 50 MG/G
PATCH TOPICAL
Qty: 30 PATCH | Refills: 5 | Status: SHIPPED | OUTPATIENT
Start: 2022-12-06

## 2022-12-06 NOTE — TELEPHONE ENCOUNTER
Last Visit: 3/10/22 with REMY Mcgregor  Next Appointment: Ignacio Mail to follow-up in 4 months  Previous Refill Encounter(s): 4/12/22 #30 with 5 refills    Requested Prescriptions     Pending Prescriptions Disp Refills    lidocaine (LIDODERM) 5 % 30 Patch 5     Sig: Apply patch to the affected area for 12 hours a day and remove for 12 hours a day. For [address] in place:   Recommendation Provided To:    Intervention Detail: New Rx: 1, reason: Patient Preference  Gap Closed?:   Intervention Accepted By:   Time Spent (min): 5

## 2023-02-03 ENCOUNTER — TELEPHONE (OUTPATIENT)
Dept: PHARMACY | Age: 68
End: 2023-02-03

## 2023-02-03 NOTE — TELEPHONE ENCOUNTER
Swathi Gonzalez, REMY ,    Kristin Saucedo has an appointment with you 2/7/2023. She has been identified with a care gap for Diabetes without a statin (SUPD). Per her chart review: Patient has a listed allergy to statins. Unfortunately, allergies are not billed and a CMS approved dx code must be entered on a billable office visit in order for this patient to be excluded., Patient has tried and failed two statins. An approved CMS dx code may be entered into a billable office visit to exclude this patient from this care gap      Please submit one of the following CMS allowable diagnoses within visit encounter:  Myalgia due to statin (M79.10, T46.6X5A)   Statin myopathy (G72.0)  Rhabdomyolysis (M62.82)  Prediabetes (R73.03, R73.09)    This will exclude her for 2023 SUPD care gap    Please let me know if you have any questions!   Thank you,  Castro Banks, PharmD, [facility]   Department, toll free: [phone] Option 2  ================================================================================   Myalgia due to statin (M79.10, T46.6X5A) added to OV 2/7/2023    For Pharmacy Admin Tracking Only    Program: [address] in place: No  Recommendation Provided To: Provider: 1 via Note to Provider   Intervention Detail: New Rx: 0, reason: Needs Additional Therapy  Intervention Accepted By: Provider: 1  Gap Closed?: Yes  Time Spent (min): 15

## 2023-02-05 PROBLEM — T46.6X5A MYALGIA DUE TO STATIN: Status: ACTIVE | Noted: 2023-02-05

## 2023-02-05 PROBLEM — M79.10 MYALGIA DUE TO STATIN: Status: ACTIVE | Noted: 2023-02-05

## 2023-02-07 ENCOUNTER — OFFICE VISIT (OUTPATIENT)
Dept: FAMILY MEDICINE CLINIC | Age: 68
End: 2023-02-07
Payer: MEDICARE

## 2023-02-07 VITALS
DIASTOLIC BLOOD PRESSURE: 65 MMHG | SYSTOLIC BLOOD PRESSURE: 115 MMHG | RESPIRATION RATE: 18 BRPM | OXYGEN SATURATION: 95 % | BODY MASS INDEX: 27.78 KG/M2 | TEMPERATURE: 97.2 F | HEART RATE: 112 BPM | WEIGHT: 156.8 LBS | HEIGHT: 63 IN

## 2023-02-07 DIAGNOSIS — E11.42 TYPE 2 DIABETES MELLITUS WITH DIABETIC POLYNEUROPATHY, WITHOUT LONG-TERM CURRENT USE OF INSULIN (HCC): Primary | ICD-10-CM

## 2023-02-07 DIAGNOSIS — M79.10 MYALGIA DUE TO STATIN: ICD-10-CM

## 2023-02-07 DIAGNOSIS — D64.9 ANEMIA, UNSPECIFIED TYPE: ICD-10-CM

## 2023-02-07 DIAGNOSIS — Z72.0 TOBACCO ABUSE: ICD-10-CM

## 2023-02-07 DIAGNOSIS — T46.6X5A MYALGIA DUE TO STATIN: ICD-10-CM

## 2023-02-07 DIAGNOSIS — I10 ESSENTIAL HYPERTENSION: ICD-10-CM

## 2023-02-07 DIAGNOSIS — I73.9 CLAUDICATION OF BOTH LOWER EXTREMITIES (HCC): ICD-10-CM

## 2023-02-07 DIAGNOSIS — Z78.0 POSTMENOPAUSAL STATE: ICD-10-CM

## 2023-02-07 DIAGNOSIS — R05.3 CHRONIC COUGH: ICD-10-CM

## 2023-02-07 DIAGNOSIS — Z12.31 ENCOUNTER FOR SCREENING MAMMOGRAM FOR MALIGNANT NEOPLASM OF BREAST: ICD-10-CM

## 2023-02-07 DIAGNOSIS — F33.0 MILD EPISODE OF RECURRENT MAJOR DEPRESSIVE DISORDER (HCC): ICD-10-CM

## 2023-02-07 DIAGNOSIS — J44.9 COPD (CHRONIC OBSTRUCTIVE PULMONARY DISEASE) WITH CHRONIC BRONCHITIS (HCC): ICD-10-CM

## 2023-02-07 DIAGNOSIS — R00.2 PALPITATIONS: ICD-10-CM

## 2023-02-07 DIAGNOSIS — R06.02 SHORTNESS OF BREATH: ICD-10-CM

## 2023-02-07 LAB — UR CULT HOLD, URHOLD: NORMAL

## 2023-02-07 PROCEDURE — 1101F PT FALLS ASSESS-DOCD LE1/YR: CPT | Performed by: NURSE PRACTITIONER

## 2023-02-07 PROCEDURE — 93010 ELECTROCARDIOGRAM REPORT: CPT | Performed by: NURSE PRACTITIONER

## 2023-02-07 PROCEDURE — G8536 NO DOC ELDER MAL SCRN: HCPCS | Performed by: NURSE PRACTITIONER

## 2023-02-07 PROCEDURE — G9899 SCRN MAM PERF RSLTS DOC: HCPCS | Performed by: NURSE PRACTITIONER

## 2023-02-07 PROCEDURE — 1123F ACP DISCUSS/DSCN MKR DOCD: CPT | Performed by: NURSE PRACTITIONER

## 2023-02-07 PROCEDURE — 1090F PRES/ABSN URINE INCON ASSESS: CPT | Performed by: NURSE PRACTITIONER

## 2023-02-07 PROCEDURE — 2022F DILAT RTA XM EVC RTNOPTHY: CPT | Performed by: NURSE PRACTITIONER

## 2023-02-07 PROCEDURE — 3078F DIAST BP <80 MM HG: CPT | Performed by: NURSE PRACTITIONER

## 2023-02-07 PROCEDURE — G8427 DOCREV CUR MEDS BY ELIG CLIN: HCPCS | Performed by: NURSE PRACTITIONER

## 2023-02-07 PROCEDURE — G0463 HOSPITAL OUTPT CLINIC VISIT: HCPCS | Performed by: NURSE PRACTITIONER

## 2023-02-07 PROCEDURE — G8400 PT W/DXA NO RESULTS DOC: HCPCS | Performed by: NURSE PRACTITIONER

## 2023-02-07 PROCEDURE — 3017F COLORECTAL CA SCREEN DOC REV: CPT | Performed by: NURSE PRACTITIONER

## 2023-02-07 PROCEDURE — 3074F SYST BP LT 130 MM HG: CPT | Performed by: NURSE PRACTITIONER

## 2023-02-07 PROCEDURE — G8417 CALC BMI ABV UP PARAM F/U: HCPCS | Performed by: NURSE PRACTITIONER

## 2023-02-07 PROCEDURE — G8432 DEP SCR NOT DOC, RNG: HCPCS | Performed by: NURSE PRACTITIONER

## 2023-02-07 PROCEDURE — 3046F HEMOGLOBIN A1C LEVEL >9.0%: CPT | Performed by: NURSE PRACTITIONER

## 2023-02-07 PROCEDURE — 93005 ELECTROCARDIOGRAM TRACING: CPT | Performed by: NURSE PRACTITIONER

## 2023-02-07 PROCEDURE — 99215 OFFICE O/P EST HI 40 MIN: CPT | Performed by: NURSE PRACTITIONER

## 2023-02-07 RX ORDER — PREDNISONE 10 MG/1
10 TABLET ORAL 2 TIMES DAILY
Qty: 10 TABLET | Refills: 0 | Status: SHIPPED | OUTPATIENT
Start: 2023-02-07 | End: 2023-02-12

## 2023-02-07 RX ORDER — LORAZEPAM 1 MG/1
1 TABLET ORAL
Qty: 30 TABLET | Refills: 1 | Status: SHIPPED | OUTPATIENT
Start: 2023-02-07

## 2023-02-07 RX ORDER — AMOXICILLIN AND CLAVULANATE POTASSIUM 875; 125 MG/1; MG/1
1 TABLET, FILM COATED ORAL 2 TIMES DAILY
Qty: 20 TABLET | Refills: 0 | Status: SHIPPED | OUTPATIENT
Start: 2023-02-07 | End: 2023-02-17

## 2023-02-07 NOTE — PROGRESS NOTES
Regan Hamman (: 1955) is a 79 y.o. female, established patient, here for evaluation of the following chief complaint(s):  Leg Pain (x 1 week)       ASSESSMENT/PLAN:  Below is the assessment and plan developed based on review of pertinent history, physical exam, labs, studies, and medications. 1. Type 2 diabetes mellitus with diabetic polyneuropathy, without long-term current use of insulin ([facility] [address]  -     LIPID PANEL; Future  -      DIABETES FOOT EXAM  -     HEMOGLOBIN A1C WITH EAG; Future  -     CBC W/O DIFF; Future  -     METABOLIC PANEL, COMPREHENSIVE; Future  -     TSH 3RD GENERATION; Future  -     URINALYSIS W/MICROSCOPIC; Future  -     MICROALBUMIN, UR, RAND W/ MICROALB/CREAT RATIO; Future  2. Essential hypertension  -     LORazepam (ATIVAN) 1 mg tablet; Take 1 Tablet by mouth nightly as needed for Anxiety. Max Daily Amount: 1 mg., Normal, Disp-30 Tablet, R-1  -     CBC W/O DIFF; Future  -     METABOLIC PANEL, COMPREHENSIVE; Future  -     TSH 3RD GENERATION; Future  -     URINALYSIS W/MICROSCOPIC; Future  -     MICROALBUMIN, UR, RAND W/ MICROALB/CREAT RATIO; Future  3. COPD (chronic obstructive pulmonary disease) with chronic bronchitis (HCC)  -     LORazepam (ATIVAN) 1 mg tablet; Take 1 Tablet by mouth nightly as needed for Anxiety. Max Daily Amount: 1 mg., Normal, Disp-30 Tablet, R-1  -     XR CHEST PA LAT; Future  -     REFERRAL TO PULMONARY DISEASE  -     amoxicillin-clavulanate (AUGMENTIN) 875-125 mg per tablet; Take 1 Tablet by mouth two (2) times a day for 10 days. , Normal, Disp-20 Tablet, R-0  -     predniSONE (DELTASONE) 10 mg tablet; Take 10 mg by mouth two (2) times a day for 5 days. , Normal, Disp-10 Tablet, R-0  4. Chronic cough  -     XR CHEST PA LAT; Future  5. Shortness of breath  -     XR CHEST PA LAT; Future  -     REFERRAL TO CARDIOLOGY  -     REFERRAL TO PULMONARY DISEASE  6. Palpitations  -     AMB POC EKG ROUTINE W/ 12 LEADS, INTER & REP  -     REFERRAL TO CARDIOLOGY  7. Claudication of both lower extremities ([facility] [address]  -     ANKLE BRACHIAL INDEX; Future  8. Anemia, unspecified type  -     CBC W/O DIFF; Future  -     IRON PROFILE; Future  -     FERRITIN; Future  9. Tobacco abuse  -     ANKLE BRACHIAL INDEX; Future  10. Encounter for screening mammogram for malignant neoplasm of breast  -     [facility] MAMMO BI SCREENING INCL CAD; Future  11. Postmenopausal state  -     DEXA BONE DENSITY STUDY AXIAL; Future  12. Myalgia due to statin  13. Mild episode of recurrent major depressive disorder ([facility] [address]    Return in about 4 months (around 6/7/2023). SUBJECTIVE/OBJECTIVE:  HPI  patient comes in today for multiple concerns   Increase in chest congestion. No fever, chills, body aches. Smokes about 1 ppd (has cut back from 1 ppd). Hx COPD. Uses nebulizer 2-3 times daily. Has oxygen at home for last couple years. Is interested in portable concentrator. Using symbicort BID as prescribed. Did not like Spiriva. Has home pulse ox 96%, sometimes will go down to 93 or 94%  Feels tired all the time. Taste has changed dramatically, doesn't like certain foods anymore. Has been going on for 6 months. Recent exposure to COVID. Walked patient in [place], oxygen did not drop below 92-93% on room air. Unable to get portable oxygen     When she coughs, has to hold epigastric area because of pain. Has pain in epigastric area when not coughing. Taking omeprazole. Does not eat spicy foods. No EGD    Using lorazepam for sleep. Sometimes has to take 2 tabs of 0.5mg    Thinks she may have urinary tract infection. Some urgency. Feet are swelling. Bottom of feet swell that she has hard time walking. Legs are aching when she walks. When she stops walking pain improves. Taking gabapentin. Uses something similar to a pneumatic compression device that she uses as needed to help with legs aching. May use for 5-10 minutes.   States initially it has helped but recently that does not help aching in her legs    [place] go [place] 3 [place]. Daughter paid for her trip. Had problem with anemia in July 2021 from acute blood loss. 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 Denies any tingling sensation, polyuria and polydipsia. No blurred vision. taking Jentadueto 2.5/500mg BID     Hx of breast cancer over 10 years ago . Hx left lumpectomy. did radiation. Radiation at [address] Oncologist - Dr. Nidia Otoole. Needs mammogram - had not had in a while.   Allergies   Allergen Reactions    Statins-Hmg-Coa Reductase Inhibitors Myalgia       Past Medical History:   Diagnosis Date    Atopic dermatitis 4/7/2016    Breast cancer (HCC)     COPD (chronic obstructive pulmonary disease) with chronic bronchitis ([facility] [address] 7/26/2018    Essential hypertension 3/31/2016    EVER (generalized anxiety disorder) 10/11/2018    Tremor of face and hands 10/11/2018       Past Surgical History:   Procedure Laterality Date    COLONOSCOPY N/A 7/9/2021    COLONOSCOPY performed by Rajesh Lange MD at [facility] ENDOSCOPY    HX BREAST LUMPECTOMY      HX HYSTERECTOMY         Social History     Socioeconomic History    Marital status:      Spouse name: Not on file    Number of children: Not on file    Years of education: Not on file    Highest education level: Not on file   Occupational History    Not on file   Tobacco Use    Smoking status: Every Day     Packs/day: 1.50     Types: Cigarettes    Smokeless tobacco: Never   Vaping Use    Vaping Use: Never used   Substance and Sexual Activity    Alcohol use: No    Drug use: No    Sexual activity: Not Currently   Other Topics Concern    Not on file   Social History Narrative    Not on file     Social Determinants of Health     Financial Resource Strain: Not on file   Food Insecurity: Not on file   Transportation Needs: Not on file   Physical Activity: Not on file   Stress: Not on file   Social Connections: Not on file   Intimate Partner Violence: Not on file   Housing Stability: Not on file       Family History   Problem Relation Age of Onset    Diabetes Father     OSTEOARTHRITIS Sister     Diabetes Sister     Gall Bladder Disease Sister        Current Outpatient Medications   Medication Sig    LORazepam (ATIVAN) 1 mg tablet Take 1 Tablet by mouth nightly as needed for Anxiety. Max Daily Amount: 1 mg.    amoxicillin-clavulanate (AUGMENTIN) 875-125 mg per tablet Take 1 Tablet by mouth two (2) times a day for 10 days. predniSONE (DELTASONE) 10 mg tablet Take 10 mg by mouth two (2) times a day for 5 days. lidocaine (LIDODERM) 5 % Apply patch to the affected area for 12 hours a day and remove for 12 hours a day. linagliptin-metFORMIN (Jentadueto) 2.5-500 mg per tablet Take 1 Tablet by mouth two (2) times daily (with meals). buPROPion SR (WELLBUTRIN SR) 150 mg SR tablet Take 1 Tablet by mouth two (2) times a day. budesonide-formoteroL (Symbicort) 160-4.5 mcg/actuation HFAA INHALE TWO PUFFS BY MOUTH TWICE A DAY    albuterol-ipratropium (DUO-NEB) 2.5 mg-0.5 mg/3 ml nebu INHALE THREE MILLILITERS VIA NEBULIZATION BY MOUTH EVERY 4 HOURS AS NEEDED FOR WHEEZING    omeprazole (PRILOSEC) 40 mg capsule Take 1 Capsule by mouth daily. metoprolol succinate (TOPROL-XL) 25 mg XL tablet TAKE 1/2 TABLET BY MOUTH DAILY    losartan-hydroCHLOROthiazide (HYZAAR) 100-25 mg per tablet TAKE ONE TABLET BY MOUTH DAILY    gabapentin (NEURONTIN) 800 mg tablet TAKE ONE TABLET BY MOUTH THREE TIMES A DAY    nicotine (NICODERM CQ) 7 mg/24 hr 1 Patch by TransDERmal route every twenty-four (24) hours.     albuterol (PROVENTIL HFA, VENTOLIN HFA, PROAIR HFA) 90 mcg/actuation inhaler INHALE TWO PUFFS BY MOUTH EVERY 4 HOURS AS NEEDED FOR WHEEZING AND  SHORTNESS OF BREATH    cholecalciferol (VITAMIN D3) (1000 Units /25 mcg) tablet Take 1,000 Units by mouth daily. Blood-Glucose Meter monitoring kit Monitor BG once daily and as needed  Dx: E11.42  Provide with glucometer that insurance covers. lancets misc Monitor BG once daily and as needed  Dx: E11.42  Provide with glucometer/lancets that insurance covers. glucose blood VI test strips (BLOOD GLUCOSE TEST) strip Monitor BG once daily and as needed  Dx: E11.42  Provide with glucometer/strips that insurance covers. No current facility-administered medications for this visit. Review of Systems   Constitutional:  Negative for chills, diaphoresis, fatigue and fever. HENT:  Negative for congestion, postnasal drip, rhinorrhea, sinus pressure and sinus pain. Respiratory:  Positive for cough, shortness of breath and wheezing. Cardiovascular:  Positive for leg swelling. Negative for chest pain and palpitations. Claudication pain     Gastrointestinal:  Negative for abdominal pain, blood in stool, constipation, diarrhea, nausea and vomiting. Endocrine: Negative for polydipsia, polyphagia and polyuria. Genitourinary:  Positive for frequency and urgency. Negative for dysuria, flank pain and hematuria. Musculoskeletal:  Positive for arthralgias, back pain, myalgias (right rib pain after fall, mscle cramping) and neck pain. Skin: Negative. Allergic/Immunologic: Positive for environmental allergies. Neurological:  Positive for numbness (and pain in feet bilat). Negative for dizziness, speech difficulty, light-headedness and headaches. Psychiatric/Behavioral:  Negative for dysphoric mood and sleep disturbance. The patient is nervous/anxious. Vitals:    02/07/23 0946   BP: 115/65   Pulse: (!) 112   Resp: 18   Temp: 97.2 °F (36.2 °C)   TempSrc: Oral   SpO2: 95%   Weight: 156 lb 12.8 oz (71.1 kg)   Height: 5' 3\" (1.6 m)     Physical Exam  Vitals reviewed.    Constitutional: Appearance: Normal appearance. She is well-developed and well-groomed. HENT:      Right Ear: Hearing normal.      Left Ear: Hearing normal.   Neck:      Thyroid: No thyromegaly. Cardiovascular:      Rate and Rhythm: Regular rhythm. Tachycardia present. Pulses:           Dorsalis pedis pulses are 2+ on the right side and 2+ on the left side. Heart sounds: Murmur heard. Systolic murmur is present. Pulmonary:      Effort: Pulmonary effort is normal.      Breath sounds: Normal breath sounds. Abdominal:      General: Bowel sounds are normal.      Palpations: Abdomen is soft. Tenderness: There is no abdominal tenderness. Musculoskeletal:      Lumbar back: Normal. Negative right straight leg raise test and negative left straight leg raise test.      Right lower leg: No edema. Left lower leg: No edema. Right foot: Normal range of motion. No deformity, bunion or foot drop. Feet:      Right foot:      Protective Sensation: 4 sites tested. 4 sites sensed. Skin integrity: Skin integrity normal.      Toenail Condition: Right toenails are normal.      Left foot:      Protective Sensation: 4 sites tested. 4 sites sensed. Skin integrity: Skin integrity normal.      Toenail Condition: Left toenails are normal.      Comments: Diabetic foot exam:     Left: Intact sensation to monofilament 4/4 locations   Position sense intact   2+ DP pulse   No foot deformities  Right: Intact sensation to monofilament 4/4 locations   Position sense intact   2+ DP pulse   No foot deformities  Lymphadenopathy:      Cervical: No cervical adenopathy. Skin:     General: Skin is warm and dry. Neurological:      Mental Status: She is alert and oriented to person, place, and time. Psychiatric:         Attention and Perception: Attention normal.         Mood and Affect: Mood and affect normal.         Speech: Speech normal.         Behavior: Behavior normal. Behavior is cooperative.          Thought Content: Thought content normal.         Cognition and Memory: Cognition and memory normal.     On this date 02/07/2023 I have spent 43 minutes reviewing previous notes, test results and face to face with the patient discussing the diagnosis and importance of compliance with the treatment plan as well as documenting on the day of the visit. An electronic signature was used to authenticate this note.   -- Osmani Beal NP

## 2023-02-07 NOTE — PROGRESS NOTES
Chief Complaint   Patient presents with    Leg Pain     x 1 week       1. \"Have you been to the ER, urgent care clinic since your last visit? Hospitalized since your last visit? \" Yes When: [facility] ER 12/5/22 for acute chest pain     2. \"Have you seen or consulted any other health care providers outside of the [address] [place] [address] since your last visit? \" No     3. For patients aged 39-70: Has the patient had a colonoscopy / FIT/ Cologuard? Yes - Care Gap present. Most recent result on file      If the patient is female:    4. For patients aged 41-77: Has the patient had a mammogram within the past 2 years? No      5. For patients aged 21-65: Has the patient had a pap smear? No - hysterectomy    Pt is here due to leg and foot pain x 1 week. She would also like to discuss chest pain and pain when coughing.      Health Maintenance Due   Topic Date Due    Shingles Vaccine (1 of 2) Never done    Breast Cancer Screen Mammogram  01/17/2013    Bone Densitometry (Dexa) Screening  Never done    COVID-19 Vaccine (3 - Booster for Poonam series) 01/05/2022    Diabetic Alb to Cr ratio (uACR) test  07/06/2022    Foot Exam Q1  07/06/2022    Flu Vaccine (1) Never done    Lipid Screen  11/10/2022

## 2023-02-08 LAB
ALBUMIN SERPL-MCNC: 3.6 G/DL (ref 3.5–5)
ALBUMIN/GLOB SERPL: 0.9 (ref 1.1–2.2)
ALP SERPL-CCNC: 193 U/L (ref 45–117)
ALT SERPL-CCNC: 13 U/L (ref 12–78)
ANION GAP SERPL CALC-SCNC: 6 MMOL/L (ref 5–15)
APPEARANCE UR: ABNORMAL
AST SERPL-CCNC: 9 U/L (ref 15–37)
BACTERIA URNS QL MICRO: NEGATIVE /HPF
BILIRUB SERPL-MCNC: 0.5 MG/DL (ref 0.2–1)
BILIRUB UR QL: NEGATIVE
BUN SERPL-MCNC: 14 MG/DL (ref 6–20)
BUN/CREAT SERPL: 9 (ref 12–20)
CALCIUM SERPL-MCNC: 10 MG/DL (ref 8.5–10.1)
CHLORIDE SERPL-SCNC: 104 MMOL/L (ref 97–108)
CO2 SERPL-SCNC: 28 MMOL/L (ref 21–32)
COLOR UR: YELLOW
CREAT SERPL-MCNC: 1.54 MG/DL (ref 0.55–1.02)
CREAT UR-MCNC: 72.5 MG/DL
EPITH CASTS URNS QL MICRO: ABNORMAL /LPF
ERYTHROCYTE [DISTWIDTH] IN BLOOD BY AUTOMATED COUNT: 14.8 % (ref 11.5–14.5)
FERRITIN SERPL-MCNC: 45 NG/ML (ref 26–388)
GLOBULIN SER CALC-MCNC: 3.8 G/DL (ref 2–4)
GLUCOSE SERPL-MCNC: 167 MG/DL (ref 65–100)
GLUCOSE UR STRIP.AUTO-MCNC: NEGATIVE MG/DL
HCT VFR BLD AUTO: 26 % (ref 35–47)
HGB BLD-MCNC: 7.9 G/DL (ref 11.5–16)
HGB UR QL STRIP: NEGATIVE
HYALINE CASTS URNS QL MICRO: ABNORMAL /LPF (ref 0–5)
IRON SATN MFR SERPL: 15 % (ref 20–50)
IRON SERPL-MCNC: 48 UG/DL (ref 35–150)
KETONES UR QL STRIP.AUTO: NEGATIVE MG/DL
LEUKOCYTE ESTERASE UR QL STRIP.AUTO: ABNORMAL
MCH RBC QN AUTO: 28.4 PG (ref 26–34)
MCHC RBC AUTO-ENTMCNC: 30.4 G/DL (ref 30–36.5)
MCV RBC AUTO: 93.5 FL (ref 80–99)
MICROALBUMIN UR-MCNC: 2.88 MG/DL
MICROALBUMIN/CREAT UR-RTO: 40 MG/G (ref 0–30)
NITRITE UR QL STRIP.AUTO: NEGATIVE
NRBC # BLD: 0 K/UL (ref 0–0.01)
NRBC BLD-RTO: 0 PER 100 WBC
PH UR STRIP: 5.5 (ref 5–8)
PLATELET # BLD AUTO: 298 K/UL (ref 150–400)
PMV BLD AUTO: 11.3 FL (ref 8.9–12.9)
POTASSIUM SERPL-SCNC: 3.4 MMOL/L (ref 3.5–5.1)
PROT SERPL-MCNC: 7.4 G/DL (ref 6.4–8.2)
PROT UR STRIP-MCNC: NEGATIVE MG/DL
RBC # BLD AUTO: 2.78 M/UL (ref 3.8–5.2)
RBC #/AREA URNS HPF: ABNORMAL /HPF (ref 0–5)
SODIUM SERPL-SCNC: 138 MMOL/L (ref 136–145)
SP GR UR REFRACTOMETRY: 1.01 (ref 1–1.03)
TIBC SERPL-MCNC: 330 UG/DL (ref 250–450)
TSH SERPL DL<=0.05 MIU/L-ACNC: 1.73 UIU/ML (ref 0.36–3.74)
UROBILINOGEN UR QL STRIP.AUTO: 0.2 EU/DL (ref 0.2–1)
WBC # BLD AUTO: 11.1 K/UL (ref 3.6–11)
WBC URNS QL MICRO: ABNORMAL /HPF (ref 0–4)

## 2023-02-16 ENCOUNTER — TELEPHONE (OUTPATIENT)
Dept: FAMILY MEDICINE CLINIC | Age: 68
End: 2023-02-16

## 2023-02-16 NOTE — TELEPHONE ENCOUNTER
Patient called back in regards to lab work results and is requesting a call back from the nurse to discuss. She can be reached at [phone].

## 2023-02-16 NOTE — TELEPHONE ENCOUNTER
----- Message from Alicja Spence NP sent at 2/13/2023  6:23 PM EST -----  Dena Mendoza - call patient. Hgb low at 7.9.  has history of AVM in upper GI tract (GI bleed). Needs to see GI ASAP. Need to repeat hemoglobin in 1 week. Can you also see why A1c and lipid were not done. If we need to, we can repeat in 1 week with hemoglobin repeat. Will repeat BMP as well. Urine positive for UTI - the Augmentin should help clear her symptoms. Make sure is drinking plenty of water.

## 2023-02-17 ENCOUNTER — TELEPHONE (OUTPATIENT)
Dept: FAMILY MEDICINE CLINIC | Age: 68
End: 2023-02-17

## 2023-02-17 NOTE — TELEPHONE ENCOUNTER
Patient called back and was informed of her lab results. Has taken Augmentin minus 1 day of a full course. Patient reports feeling tired and having no energy. Patient also reports has not contacted a GI MD as of yet. With her current symptoms this writer advised patient to go to ER. Patient reports understanding.

## 2023-02-17 NOTE — TELEPHONE ENCOUNTER
Patient would like a call from [facility] regarding what's going on with her she can be reached @ [phone]

## 2023-02-22 ENCOUNTER — OFFICE VISIT (OUTPATIENT)
Dept: FAMILY MEDICINE CLINIC | Age: 68
End: 2023-02-22

## 2023-02-22 DIAGNOSIS — R79.89 ELEVATED SERUM CREATININE: ICD-10-CM

## 2023-02-22 DIAGNOSIS — E11.42 TYPE 2 DIABETES MELLITUS WITH DIABETIC POLYNEUROPATHY, WITHOUT LONG-TERM CURRENT USE OF INSULIN (HCC): ICD-10-CM

## 2023-02-22 DIAGNOSIS — E78.2 MIXED HYPERLIPIDEMIA: ICD-10-CM

## 2023-02-22 DIAGNOSIS — Z01.89 ROUTINE LAB DRAW: Primary | ICD-10-CM

## 2023-02-22 DIAGNOSIS — D64.9 ANEMIA, UNSPECIFIED TYPE: ICD-10-CM

## 2023-02-22 NOTE — PROGRESS NOTES
Linda Rueda (: 1955) is a 79 y.o. female, established patient, here for evaluation of the following chief complaint(s):  Labs Only       ASSESSMENT/PLAN:  Below is the assessment and plan developed based on review of pertinent history, physical exam, labs, studies, and medications. 1. Routine lab draw  2. Anemia, unspecified type  -     CBC W/O DIFF; Future  3. Mixed hyperlipidemia  -     LIPID PANEL; Future  4. Elevated serum creatinine  -     METABOLIC PANEL, BASIC; Future  5. Type 2 diabetes mellitus with diabetic polyneuropathy, without long-term current use of insulin (HCC)  -     CBC W/O DIFF; Future  -     METABOLIC PANEL, BASIC; Future  -     HEMOGLOBIN A1C WITH EAG; Future  -     LIPID PANEL; Future      An electronic signature was used to authenticate this note.   -- Tiburcio Estrella NP

## 2023-02-23 LAB
ANION GAP SERPL CALC-SCNC: 8 MMOL/L (ref 5–15)
BUN SERPL-MCNC: 25 MG/DL (ref 6–20)
BUN/CREAT SERPL: 16 (ref 12–20)
CALCIUM SERPL-MCNC: 9.4 MG/DL (ref 8.5–10.1)
CHLORIDE SERPL-SCNC: 97 MMOL/L (ref 97–108)
CHOLEST SERPL-MCNC: 208 MG/DL
CO2 SERPL-SCNC: 27 MMOL/L (ref 21–32)
CREAT SERPL-MCNC: 1.61 MG/DL (ref 0.55–1.02)
ERYTHROCYTE [DISTWIDTH] IN BLOOD BY AUTOMATED COUNT: 15.4 % (ref 11.5–14.5)
EST. AVERAGE GLUCOSE BLD GHB EST-MCNC: 117 MG/DL
GLUCOSE SERPL-MCNC: 142 MG/DL (ref 65–100)
HBA1C MFR BLD: 5.7 % (ref 4–5.6)
HCT VFR BLD AUTO: 25.6 % (ref 35–47)
HDLC SERPL-MCNC: 35 MG/DL
HDLC SERPL: 5.9 (ref 0–5)
HGB BLD-MCNC: 7.5 G/DL (ref 11.5–16)
LDLC SERPL CALC-MCNC: 130.4 MG/DL (ref 0–100)
MCH RBC QN AUTO: 27.4 PG (ref 26–34)
MCHC RBC AUTO-ENTMCNC: 29.3 G/DL (ref 30–36.5)
MCV RBC AUTO: 93.4 FL (ref 80–99)
NRBC # BLD: 0 K/UL (ref 0–0.01)
NRBC BLD-RTO: 0 PER 100 WBC
PLATELET # BLD AUTO: 386 K/UL (ref 150–400)
PMV BLD AUTO: 11.7 FL (ref 8.9–12.9)
POTASSIUM SERPL-SCNC: 3.6 MMOL/L (ref 3.5–5.1)
RBC # BLD AUTO: 2.74 M/UL (ref 3.8–5.2)
SODIUM SERPL-SCNC: 132 MMOL/L (ref 136–145)
TRIGL SERPL-MCNC: 213 MG/DL (ref ?–150)
VLDLC SERPL CALC-MCNC: 42.6 MG/DL
WBC # BLD AUTO: 12.3 K/UL (ref 3.6–11)

## 2023-03-02 PROBLEM — K31.819 AVM (ARTERIOVENOUS MALFORMATION) OF DUODENUM, ACQUIRED: Status: ACTIVE | Noted: 2023-03-02

## 2023-03-02 PROBLEM — K31.819 AVM (ARTERIOVENOUS MALFORMATION) OF STOMACH, ACQUIRED: Status: ACTIVE | Noted: 2023-03-02

## 2023-03-02 PROBLEM — I21.4 NSTEMI (NON-ST ELEVATED MYOCARDIAL INFARCTION) (HCC): Status: ACTIVE | Noted: 2023-03-01

## 2023-03-06 DIAGNOSIS — K21.9 GASTROESOPHAGEAL REFLUX DISEASE, UNSPECIFIED WHETHER ESOPHAGITIS PRESENT: ICD-10-CM

## 2023-03-06 DIAGNOSIS — J20.9 ACUTE BRONCHITIS, UNSPECIFIED ORGANISM: ICD-10-CM

## 2023-03-06 DIAGNOSIS — J44.9 COPD (CHRONIC OBSTRUCTIVE PULMONARY DISEASE) WITH CHRONIC BRONCHITIS (HCC): ICD-10-CM

## 2023-03-06 DIAGNOSIS — J45.20 MILD INTERMITTENT ASTHMA WITHOUT COMPLICATION: ICD-10-CM

## 2023-03-06 DIAGNOSIS — I10 ESSENTIAL HYPERTENSION: ICD-10-CM

## 2023-03-06 RX ORDER — METOPROLOL SUCCINATE 25 MG/1
12.5 TABLET, EXTENDED RELEASE ORAL DAILY
Qty: 45 TABLET | Refills: 3 | Status: SHIPPED | OUTPATIENT
Start: 2023-03-06

## 2023-03-06 RX ORDER — OMEPRAZOLE 40 MG/1
40 CAPSULE, DELAYED RELEASE ORAL DAILY
Qty: 90 CAPSULE | Refills: 3 | Status: SHIPPED
Start: 2023-03-06 | End: 2023-03-08 | Stop reason: ALTCHOICE

## 2023-03-08 RX ORDER — EZETIMIBE 10 MG/1
10 TABLET ORAL DAILY
Qty: 90 TABLET | Refills: 3 | Status: SHIPPED | OUTPATIENT
Start: 2023-03-08

## 2023-03-08 RX ORDER — ROSUVASTATIN CALCIUM 5 MG/1
5 TABLET, COATED ORAL
Qty: 90 TABLET | Refills: 3 | Status: SHIPPED | OUTPATIENT
Start: 2023-03-08

## 2023-03-08 RX ORDER — PANTOPRAZOLE SODIUM 40 MG/1
40 TABLET, DELAYED RELEASE ORAL 2 TIMES DAILY
Qty: 180 TABLET | Refills: 3 | Status: SHIPPED | OUTPATIENT
Start: 2023-03-08

## 2023-03-08 NOTE — TELEPHONE ENCOUNTER
Spoke to pt - she was admitted to [facility] on 2/27/23 and sent to rehab following her admission. She was prescribed new medications from the hospital but never received them while in rehab. She is requesting Pantoprazole 40mg twice daily, Ezetimibe 10mg daily, Rosuvastatin 5mg daily. She was told to stop the Omeprazole. Verified pharmacy as [facility] on [address].

## 2023-03-13 ENCOUNTER — HOSPITAL ENCOUNTER (OUTPATIENT)
Dept: VASCULAR SURGERY | Age: 68
Discharge: HOME | End: 2023-03-13
Payer: MEDICARE

## 2023-03-13 DIAGNOSIS — I73.9 CLAUDICATION OF BOTH LOWER EXTREMITIES (HCC): ICD-10-CM

## 2023-03-13 DIAGNOSIS — Z72.0 TOBACCO ABUSE: ICD-10-CM

## 2023-03-13 PROCEDURE — 93922 UPR/L XTREMITY ART 2 LEVELS: CPT

## 2023-03-14 LAB
LEFT ABI: 1.03
LEFT ARM BP: 117 MMHG
LEFT POSTERIOR TIBIAL: 118 MMHG
RIGHT ABI: 0.85
RIGHT ARM BP: 115 MMHG
RIGHT POSTERIOR TIBIAL: 99 MMHG
VAS LEFT DORSALIS PEDIS BP: 120 MMHG
VAS RIGHT DORSALIS PEDIS BP: 99 MMHG

## 2023-03-14 NOTE — PROGRESS NOTES
Possible mild arterial disease in right leg and left foot.   Would like to refer to vascular specialist.

## 2023-03-16 ENCOUNTER — TELEPHONE (OUTPATIENT)
Dept: FAMILY MEDICINE CLINIC | Age: 68
End: 2023-03-16

## 2023-03-16 DIAGNOSIS — I73.9 PAD (PERIPHERAL ARTERY DISEASE) (HCC): Primary | ICD-10-CM

## 2023-03-16 NOTE — TELEPHONE ENCOUNTER
Orders Placed This [facility] Vascular Surgery ED [facility]     Referral Priority:   Routine     Referral Type:   Consultation     Referral Reason:   Specialty Services Required     Referred to Provider:   Juliette Mcrae MD     Number of Visits Requested:   1

## 2023-03-16 NOTE — TELEPHONE ENCOUNTER
Verified patient with two type of identifiers. Spoke to pt - reviewed results per Jaspreet Mcgovern NP. Pt verbalized understanding.

## 2023-03-16 NOTE — TELEPHONE ENCOUNTER
----- Message from Kristin Yanes NP sent at 3/14/2023  5:15 PM EDT -----  Possible mild arterial disease in right leg and left foot.   Would like to refer to vascular specialist.

## 2023-03-17 ENCOUNTER — OFFICE VISIT (OUTPATIENT)
Dept: FAMILY MEDICINE CLINIC | Age: 68
End: 2023-03-17
Payer: MEDICARE

## 2023-03-17 VITALS
BODY MASS INDEX: 25.52 KG/M2 | SYSTOLIC BLOOD PRESSURE: 112 MMHG | RESPIRATION RATE: 16 BRPM | HEIGHT: 63 IN | TEMPERATURE: 97.6 F | WEIGHT: 144 LBS | DIASTOLIC BLOOD PRESSURE: 60 MMHG | HEART RATE: 92 BPM | OXYGEN SATURATION: 99 %

## 2023-03-17 DIAGNOSIS — R63.4 UNEXPLAINED WEIGHT LOSS: ICD-10-CM

## 2023-03-17 DIAGNOSIS — K92.2 UGIB (UPPER GASTROINTESTINAL BLEED): ICD-10-CM

## 2023-03-17 DIAGNOSIS — E11.42 TYPE 2 DIABETES MELLITUS WITH DIABETIC POLYNEUROPATHY, WITHOUT LONG-TERM CURRENT USE OF INSULIN (HCC): ICD-10-CM

## 2023-03-17 DIAGNOSIS — R10.84 GENERALIZED ABDOMINAL PAIN: ICD-10-CM

## 2023-03-17 DIAGNOSIS — Z09 HOSPITAL DISCHARGE FOLLOW-UP: Primary | ICD-10-CM

## 2023-03-17 DIAGNOSIS — K31.811 AVM (ARTERIOVENOUS MALFORMATION) OF STOMACH, ACQUIRED WITH HEMORRHAGE: ICD-10-CM

## 2023-03-17 DIAGNOSIS — I10 ESSENTIAL HYPERTENSION: ICD-10-CM

## 2023-03-17 PROCEDURE — 1123F ACP DISCUSS/DSCN MKR DOCD: CPT | Performed by: NURSE PRACTITIONER

## 2023-03-17 PROCEDURE — G8427 DOCREV CUR MEDS BY ELIG CLIN: HCPCS | Performed by: NURSE PRACTITIONER

## 2023-03-17 PROCEDURE — 3078F DIAST BP <80 MM HG: CPT | Performed by: NURSE PRACTITIONER

## 2023-03-17 PROCEDURE — 1101F PT FALLS ASSESS-DOCD LE1/YR: CPT | Performed by: NURSE PRACTITIONER

## 2023-03-17 PROCEDURE — G9717 DOC PT DX DEP/BP F/U NT REQ: HCPCS | Performed by: NURSE PRACTITIONER

## 2023-03-17 PROCEDURE — G8536 NO DOC ELDER MAL SCRN: HCPCS | Performed by: NURSE PRACTITIONER

## 2023-03-17 PROCEDURE — G9899 SCRN MAM PERF RSLTS DOC: HCPCS | Performed by: NURSE PRACTITIONER

## 2023-03-17 PROCEDURE — 3044F HG A1C LEVEL LT 7.0%: CPT | Performed by: NURSE PRACTITIONER

## 2023-03-17 PROCEDURE — 2022F DILAT RTA XM EVC RTNOPTHY: CPT | Performed by: NURSE PRACTITIONER

## 2023-03-17 PROCEDURE — 3017F COLORECTAL CA SCREEN DOC REV: CPT | Performed by: NURSE PRACTITIONER

## 2023-03-17 PROCEDURE — G8417 CALC BMI ABV UP PARAM F/U: HCPCS | Performed by: NURSE PRACTITIONER

## 2023-03-17 PROCEDURE — 3074F SYST BP LT 130 MM HG: CPT | Performed by: NURSE PRACTITIONER

## 2023-03-17 PROCEDURE — G8400 PT W/DXA NO RESULTS DOC: HCPCS | Performed by: NURSE PRACTITIONER

## 2023-03-17 PROCEDURE — 1090F PRES/ABSN URINE INCON ASSESS: CPT | Performed by: NURSE PRACTITIONER

## 2023-03-17 PROCEDURE — 99214 OFFICE O/P EST MOD 30 MIN: CPT | Performed by: NURSE PRACTITIONER

## 2023-03-17 NOTE — PROGRESS NOTES
Chief Complaint   Patient presents with    Hospital Follow Up     Admitted to [facility] 2/27/2023       1. \"Have you been to the ER, urgent care clinic since your last visit? Hospitalized since your last visit? \" Yes Where: admitted to [facility] 2/26/2023 for acute GI bleed, NSTEMI    2. \"Have you seen or consulted any other health care providers outside of the [address] [place] [address] since your last visit? \" No     3. For patients aged 39-70: Has the patient had a colonoscopy / FIT/ Cologuard? Yes - Care Gap present. Most recent result on file      If the patient is female:    4. For patients aged 41-77: Has the patient had a mammogram within the past 2 years? No      5. For patients aged 21-65: Has the patient had a pap smear?  NA - based on age or sex    Health Maintenance Due   Topic Date Due    Pneumococcal 65+ years (1 - PCV) Never done    Breast Cancer Screen Mammogram  01/17/2013    Bone Densitometry (Dexa) Screening  Never done    Medicare Yearly Exam  03/11/2023    Eye Exam Retinal or Dilated  03/30/2023

## 2023-03-17 NOTE — PROGRESS NOTES
Jaquan Gallagher (: 1955) is a 79 y.o. female, established patient, here for evaluation of the following chief complaint(s):  Hospital Follow Up (Admitted to [address] [place] [address] 2023)       ASSESSMENT/PLAN:  Below is the assessment and plan developed based on review of pertinent history, physical exam, labs, studies, and medications. 1. Hospital discharge follow-up  2. UGIB (upper gastrointestinal bleed) - RESOLVED, s/p APC with EGD on 23  3. AVM (arteriovenous malformation) of stomach, acquired with hemorrhage  -     CBC WITH AUTOMATED DIFF; Future  -     REFERRAL TO GASTROENTEROLOGY  4. Generalized abdominal pain  -     CT ABD PELV WO CONT; Future  -     REFERRAL TO GASTROENTEROLOGY  5. Unexplained weight loss  -     CT ABD PELV WO CONT; Future  -     REFERRAL TO GASTROENTEROLOGY  6. Essential hypertension  -     CBC WITH AUTOMATED DIFF; Future  -     METABOLIC PANEL, COMPREHENSIVE; Future  -     LIPID PANEL; Future  7. Type 2 diabetes mellitus with diabetic polyneuropathy, without long-term current use of insulin (HCC)  -     CBC WITH AUTOMATED DIFF; Future  -     METABOLIC PANEL, COMPREHENSIVE; Future  -     LIPID PANEL; Future  -     HEMOGLOBIN A1C WITH EAG    Return if symptoms worsen or fail to improve. SUBJECTIVE/OBJECTIVE:  HPI  patient here for follow up hospitalization    Patient was admitted to [facility] 23 - 3/2/23 for anemia 2/2 UGIB, NSTEMI  EGD  showed several AVMs in stomach/duodenum s/p APC. Staking pantoprazole 40mg daily. TTE showed basal inferior hypokinesis. patient has a history of statin myopathy   Patient went to SNF for rehab and left AMA    Has lost 12 pounds in last 6 weeks. Can't eat, no appetite. She is not smoking at all. Feels like she needs to more oxygen now than when she was smoking  Wants [employer]. Portable oxygen tank is too heavy and almost tripped today coming in office.   Has follow up at [facility] with cardiology on 3/27/22  Allergies   Allergen Reactions    Statins-Hmg-Coa Reductase Inhibitors Myalgia       Past Medical History:   Diagnosis Date    Atopic dermatitis 04/07/2016    AVM (arteriovenous malformation) of duodenum, acquired     AVM (arteriovenous malformation) of stomach, acquired     Breast cancer (HCC)     COPD (chronic obstructive pulmonary disease) with chronic bronchitis ([facility] 07/26/2018    Essential hypertension 03/31/2016    EVER (generalized anxiety disorder) 10/11/2018    GI bleed 03/2022    NSTEMI (non-ST elevated myocardial infarction) ([facility] 03/2023    Tremor of face and hands 10/11/2018       Past Surgical History:   Procedure Laterality Date    COLONOSCOPY N/A 7/9/2021    COLONOSCOPY performed by Isabel Lazo MD at [facility] ENDOSCOPY    HX BREAST LUMPECTOMY      HX HYSTERECTOMY         Social History     Socioeconomic History    Marital status:      Spouse name: Not on file    Number of children: Not on file    Years of education: Not on file    Highest education level: Not on file   Occupational History    Not on file   Tobacco Use    Smoking status: Every Day     Packs/day: 1.50     Types: Cigarettes    Smokeless tobacco: Never   Vaping Use    Vaping Use: Never used   Substance and Sexual Activity    Alcohol use: No    Drug use: No    Sexual activity: Not Currently   Other Topics Concern    Not on file   Social History Narrative    Not on file     Social Determinants of Health     Financial Resource Strain: Not on file   Food Insecurity: Not on file   Transportation Needs: Not on file   Physical Activity: Not on file   Stress: Not on file   Social Connections: Not on file   Intimate Partner Violence: Not on file   Housing Stability: Not on file       Family History   Problem Relation Age of Onset    Diabetes Father     OSTEOARTHRITIS Sister     Diabetes Sister     Gall Bladder Disease Sister        Current Outpatient Medications   Medication Sig    pantoprazole (PROTONIX) 40 mg tablet Take 1 Tablet by mouth two (2) times a day.    ezetimibe (ZETIA) 10 mg tablet Take 1 Tablet by mouth daily. rosuvastatin (CRESTOR) 5 mg tablet Take 1 Tablet by mouth nightly. metoprolol succinate (TOPROL-XL) 25 mg XL tablet Take 0.5 Tablets by mouth daily. LORazepam (ATIVAN) 1 mg tablet Take 1 Tablet by mouth nightly as needed for Anxiety. Max Daily Amount: 1 mg.    lidocaine (LIDODERM) 5 % Apply patch to the affected area for 12 hours a day and remove for 12 hours a day. buPROPion SR (WELLBUTRIN SR) 150 mg SR tablet Take 1 Tablet by mouth two (2) times a day. budesonide-formoteroL (Symbicort) 160-4.5 mcg/actuation HFAA INHALE TWO PUFFS BY MOUTH TWICE A DAY    albuterol-ipratropium (DUO-NEB) 2.5 mg-0.5 mg/3 ml nebu INHALE THREE MILLILITERS VIA NEBULIZATION BY MOUTH EVERY 4 HOURS AS NEEDED FOR WHEEZING    albuterol (PROVENTIL HFA, VENTOLIN HFA, PROAIR HFA) 90 mcg/actuation inhaler INHALE TWO PUFFS BY MOUTH EVERY 4 HOURS AS NEEDED FOR WHEEZING AND  SHORTNESS OF BREATH    cholecalciferol (VITAMIN D3) (1000 Units /25 mcg) tablet Take 1,000 Units by mouth daily. Blood-Glucose Meter monitoring kit Monitor BG once daily and as needed  Dx: E11.42  Provide with glucometer that insurance covers. lancets misc Monitor BG once daily and as needed  Dx: E11.42  Provide with glucometer/lancets that insurance covers. glucose blood VI test strips (BLOOD GLUCOSE TEST) strip Monitor BG once daily and as needed  Dx: E11.42  Provide with glucometer/strips that insurance covers. gabapentin (NEURONTIN) 800 mg tablet TAKE ONE TABLET BY MOUTH THREE TIMES A DAY (Patient not taking: Reported on 3/17/2023)     No current facility-administered medications for this visit. Review of Systems   Constitutional:  Positive for appetite change, fatigue and unexpected weight change. Negative for chills and fever. Respiratory:  Positive for shortness of breath and wheezing. Cardiovascular:  Negative for chest pain, palpitations and leg swelling. Gastrointestinal: Negative. Genitourinary: Negative. Vitals:    03/17/23 1142   BP: 112/60   Pulse: 92   Resp: 16   Temp: 97.6 °F (36.4 °C)   TempSrc: Oral   SpO2: 99%   Weight: 144 lb (65.3 kg)   Height: 5' 3\" (1.6 m)     Physical Exam  Vitals reviewed. Constitutional:       Appearance: Normal appearance. She is well-developed and well-groomed. HENT:      Right Ear: Hearing normal.      Left Ear: Hearing normal.   Neck:      Thyroid: No thyromegaly. Cardiovascular:      Rate and Rhythm: Normal rate and regular rhythm. Heart sounds: Murmur heard. Systolic murmur is present. Pulmonary:      Effort: Pulmonary effort is normal.      Breath sounds: Normal breath sounds. Abdominal:      General: Bowel sounds are normal.      Palpations: Abdomen is soft. Tenderness: There is no abdominal tenderness. Musculoskeletal:      Right lower leg: No edema. Left lower leg: No edema. Skin:     General: Skin is warm and dry. Neurological:      Mental Status: She is alert and oriented to person, place, and time. Psychiatric:         Attention and Perception: Attention normal.         Mood and Affect: Mood and affect normal.         Speech: Speech normal.         Behavior: Behavior normal. Behavior is cooperative. Thought Content: Thought content normal.         Cognition and Memory: Cognition and memory normal.     On this date 03/17/2023 I have spent 32 minutes reviewing previous notes, test results and face to face with the patient discussing the diagnosis and importance of compliance with the treatment plan as well as documenting on the day of the visit. An electronic signature was used to authenticate this note.   -- Osmani Beal NP

## 2023-03-18 LAB
ALBUMIN SERPL-MCNC: 3.2 G/DL (ref 3.5–5)
ALBUMIN/GLOB SERPL: 0.9 (ref 1.1–2.2)
ALP SERPL-CCNC: 250 U/L (ref 45–117)
ALT SERPL-CCNC: 9 U/L (ref 12–78)
ANION GAP SERPL CALC-SCNC: 8 MMOL/L (ref 5–15)
AST SERPL-CCNC: 14 U/L (ref 15–37)
BASOPHILS # BLD: 0 K/UL (ref 0–0.1)
BASOPHILS NFR BLD: 0 % (ref 0–1)
BILIRUB SERPL-MCNC: 0.8 MG/DL (ref 0.2–1)
BUN SERPL-MCNC: 21 MG/DL (ref 6–20)
BUN/CREAT SERPL: 14 (ref 12–20)
CALCIUM SERPL-MCNC: 9.5 MG/DL (ref 8.5–10.1)
CHLORIDE SERPL-SCNC: 101 MMOL/L (ref 97–108)
CHOLEST SERPL-MCNC: 114 MG/DL
CO2 SERPL-SCNC: 27 MMOL/L (ref 21–32)
CREAT SERPL-MCNC: 1.45 MG/DL (ref 0.55–1.02)
DIFFERENTIAL METHOD BLD: ABNORMAL
EOSINOPHIL # BLD: 0.1 K/UL (ref 0–0.4)
EOSINOPHIL NFR BLD: 1 % (ref 0–7)
ERYTHROCYTE [DISTWIDTH] IN BLOOD BY AUTOMATED COUNT: 16.5 % (ref 11.5–14.5)
EST. AVERAGE GLUCOSE BLD GHB EST-MCNC: 114 MG/DL
GLOBULIN SER CALC-MCNC: 3.7 G/DL (ref 2–4)
GLUCOSE SERPL-MCNC: 145 MG/DL (ref 65–100)
HBA1C MFR BLD: 5.6 % (ref 4–5.6)
HCT VFR BLD AUTO: 26 % (ref 35–47)
HDLC SERPL-MCNC: 51 MG/DL
HDLC SERPL: 2.2 (ref 0–5)
HGB BLD-MCNC: 7.9 G/DL (ref 11.5–16)
IMM GRANULOCYTES # BLD AUTO: 0.1 K/UL (ref 0–0.04)
IMM GRANULOCYTES NFR BLD AUTO: 1 % (ref 0–0.5)
LDLC SERPL CALC-MCNC: 40.6 MG/DL (ref 0–100)
LYMPHOCYTES # BLD: 0.8 K/UL (ref 0.8–3.5)
LYMPHOCYTES NFR BLD: 5 % (ref 12–49)
MCH RBC QN AUTO: 27.7 PG (ref 26–34)
MCHC RBC AUTO-ENTMCNC: 30.4 G/DL (ref 30–36.5)
MCV RBC AUTO: 91.2 FL (ref 80–99)
MONOCYTES # BLD: 1.1 K/UL (ref 0–1)
MONOCYTES NFR BLD: 7 % (ref 5–13)
NEUTS SEG # BLD: 13.1 K/UL (ref 1.8–8)
NEUTS SEG NFR BLD: 86 % (ref 32–75)
NRBC # BLD: 0 K/UL (ref 0–0.01)
NRBC BLD-RTO: 0 PER 100 WBC
PLATELET # BLD AUTO: 230 K/UL (ref 150–400)
PMV BLD AUTO: 11.7 FL (ref 8.9–12.9)
POTASSIUM SERPL-SCNC: 4.2 MMOL/L (ref 3.5–5.1)
PROT SERPL-MCNC: 6.9 G/DL (ref 6.4–8.2)
RBC # BLD AUTO: 2.85 M/UL (ref 3.8–5.2)
SODIUM SERPL-SCNC: 136 MMOL/L (ref 136–145)
TRIGL SERPL-MCNC: 112 MG/DL (ref ?–150)
VLDLC SERPL CALC-MCNC: 22.4 MG/DL
WBC # BLD AUTO: 15.2 K/UL (ref 3.6–11)

## 2023-03-20 PROBLEM — I48.91 ATRIAL FIBRILLATION (HCC): Status: ACTIVE | Noted: 2023-03-19

## 2023-03-29 ENCOUNTER — DOCUMENTATION ONLY (OUTPATIENT)
Dept: FAMILY MEDICINE CLINIC | Age: 68
End: 2023-03-29

## 2023-03-29 NOTE — PROGRESS NOTES
Order submitted to [employer] for universal rollator on [facility]. Supplier is currently reviewing.

## 2023-03-30 DIAGNOSIS — E11.42 TYPE 2 DIABETES MELLITUS WITH DIABETIC POLYNEUROPATHY, WITHOUT LONG-TERM CURRENT USE OF INSULIN (HCC): ICD-10-CM

## 2023-03-30 RX ORDER — GABAPENTIN 800 MG/1
800 TABLET ORAL 3 TIMES DAILY
Qty: 90 TABLET | Refills: 5 | Status: SHIPPED | OUTPATIENT
Start: 2023-03-30

## 2023-03-30 NOTE — TELEPHONE ENCOUNTER
Last Visit: 3/17/23 with NP Balbir  Next Appointment: 4/11/23 with NP Balbir  Previous Refill Encounter(s): 1/11/22 #90 with 5 refills    Requested Prescriptions     Pending Prescriptions Disp Refills    gabapentin (NEURONTIN) 800 mg tablet 90 Tablet 5     Sig: Take 1 Tablet by mouth three (3) times daily. For Pharmacy Admin Tracking Only    Program: Medication Refill  CPA in place: Recommendation Provided To:    Intervention Detail: New Rx: 1, reason: Patient Preference  Intervention Accepted By:   Angela Barragan Closed?:   Time Spent (min): 5

## 2023-04-04 ENCOUNTER — HOSPITAL ENCOUNTER (OUTPATIENT)
Age: 68
LOS: 3 days | End: 2023-04-04
Attending: STUDENT IN AN ORGANIZED HEALTH CARE EDUCATION/TRAINING PROGRAM
Payer: MEDICARE

## 2023-04-04 PROBLEM — K92.2 GIB (GASTROINTESTINAL BLEEDING): Status: ACTIVE | Noted: 2023-04-04

## 2023-04-04 LAB
ALBUMIN SERPL-MCNC: 2.5 G/DL (ref 3.5–5)
ALBUMIN/GLOB SERPL: 0.6 (ref 1.1–2.2)
ALP SERPL-CCNC: 580 U/L (ref 45–117)
ALT SERPL-CCNC: 11 U/L (ref 12–78)
ANION GAP SERPL CALC-SCNC: 6 MMOL/L (ref 5–15)
AST SERPL-CCNC: 20 U/L (ref 15–37)
BASOPHILS # BLD: 0 K/UL (ref 0–0.1)
BASOPHILS NFR BLD: 0 % (ref 0–1)
BILIRUB SERPL-MCNC: 0.8 MG/DL (ref 0.2–1)
BUN SERPL-MCNC: 24 MG/DL (ref 6–20)
BUN/CREAT SERPL: 14 (ref 12–20)
CALCIUM SERPL-MCNC: 9.2 MG/DL (ref 8.5–10.1)
CHLORIDE SERPL-SCNC: 102 MMOL/L (ref 97–108)
CO2 SERPL-SCNC: 24 MMOL/L (ref 21–32)
COMMENT, HOLDF: NORMAL
COMMENT, HOLDF: NORMAL
CREAT SERPL-MCNC: 1.76 MG/DL (ref 0.55–1.02)
DIFFERENTIAL METHOD BLD: ABNORMAL
EOSINOPHIL # BLD: 0 K/UL (ref 0–0.4)
EOSINOPHIL NFR BLD: 0 % (ref 0–7)
ERYTHROCYTE [DISTWIDTH] IN BLOOD BY AUTOMATED COUNT: 18.4 % (ref 11.5–14.5)
GLOBULIN SER CALC-MCNC: 4.1 G/DL (ref 2–4)
GLUCOSE SERPL-MCNC: 206 MG/DL (ref 65–100)
HCT VFR BLD AUTO: 23.4 % (ref 35–47)
HEMOCCULT STL QL: POSITIVE
HGB BLD-MCNC: 7.1 G/DL (ref 11.5–16)
HISTORY CHECKED?,CKHIST: NORMAL
IMM GRANULOCYTES # BLD AUTO: 0.2 K/UL (ref 0–0.04)
IMM GRANULOCYTES NFR BLD AUTO: 1 % (ref 0–0.5)
LYMPHOCYTES # BLD: 0.5 K/UL (ref 0.8–3.5)
LYMPHOCYTES NFR BLD: 3 % (ref 12–49)
MCH RBC QN AUTO: 28.4 PG (ref 26–34)
MCHC RBC AUTO-ENTMCNC: 30.3 G/DL (ref 30–36.5)
MCV RBC AUTO: 93.6 FL (ref 80–99)
MONOCYTES # BLD: 1.2 K/UL (ref 0–1)
MONOCYTES NFR BLD: 7 % (ref 5–13)
NEUTS SEG # BLD: 14.8 K/UL (ref 1.8–8)
NEUTS SEG NFR BLD: 89 % (ref 32–75)
NRBC # BLD: 0 K/UL (ref 0–0.01)
NRBC BLD-RTO: 0 PER 100 WBC
PLATELET # BLD AUTO: 254 K/UL (ref 150–400)
PLATELET COMMENTS,PCOM: ABNORMAL
PMV BLD AUTO: 11.1 FL (ref 8.9–12.9)
POTASSIUM SERPL-SCNC: 3.9 MMOL/L (ref 3.5–5.1)
PROT SERPL-MCNC: 6.6 G/DL (ref 6.4–8.2)
RBC # BLD AUTO: 2.5 M/UL (ref 3.8–5.2)
RBC MORPH BLD: ABNORMAL
RBC MORPH BLD: ABNORMAL
SAMPLES BEING HELD,HOLD: NORMAL
SAMPLES BEING HELD,HOLD: NORMAL
SODIUM SERPL-SCNC: 132 MMOL/L (ref 136–145)
TROPONIN I SERPL HS-MCNC: 97 NG/L (ref 0–37)
TROPONIN I SERPL HS-MCNC: 98 NG/L (ref 0–37)
UR CULT HOLD, URHOLD: NORMAL
WBC # BLD AUTO: 16.7 K/UL (ref 3.6–11)

## 2023-04-04 PROCEDURE — 74011000250 HC RX REV CODE- 250: Performed by: STUDENT IN AN ORGANIZED HEALTH CARE EDUCATION/TRAINING PROGRAM

## 2023-04-04 PROCEDURE — 84484 ASSAY OF TROPONIN QUANT: CPT

## 2023-04-04 PROCEDURE — 74011250636 HC RX REV CODE- 250/636: Performed by: HOSPITALIST

## 2023-04-04 PROCEDURE — 80053 COMPREHEN METABOLIC PANEL: CPT

## 2023-04-04 PROCEDURE — 74011000250 HC RX REV CODE- 250: Performed by: HOSPITALIST

## 2023-04-04 PROCEDURE — C9113 INJ PANTOPRAZOLE SODIUM, VIA: HCPCS | Performed by: STUDENT IN AN ORGANIZED HEALTH CARE EDUCATION/TRAINING PROGRAM

## 2023-04-04 PROCEDURE — 82272 OCCULT BLD FECES 1-3 TESTS: CPT

## 2023-04-04 PROCEDURE — 99285 EMERGENCY DEPT VISIT HI MDM: CPT

## 2023-04-04 PROCEDURE — 96374 THER/PROPH/DIAG INJ IV PUSH: CPT

## 2023-04-04 PROCEDURE — 87086 URINE CULTURE/COLONY COUNT: CPT

## 2023-04-04 PROCEDURE — 36430 TRANSFUSION BLD/BLD COMPNT: CPT

## 2023-04-04 PROCEDURE — 86900 BLOOD TYPING SEROLOGIC ABO: CPT

## 2023-04-04 PROCEDURE — 86923 COMPATIBILITY TEST ELECTRIC: CPT

## 2023-04-04 PROCEDURE — 65270000046 HC RM TELEMETRY

## 2023-04-04 PROCEDURE — 74011250636 HC RX REV CODE- 250/636: Performed by: STUDENT IN AN ORGANIZED HEALTH CARE EDUCATION/TRAINING PROGRAM

## 2023-04-04 PROCEDURE — 36415 COLL VENOUS BLD VENIPUNCTURE: CPT

## 2023-04-04 PROCEDURE — 93005 ELECTROCARDIOGRAM TRACING: CPT

## 2023-04-04 PROCEDURE — P9016 RBC LEUKOCYTES REDUCED: HCPCS

## 2023-04-04 PROCEDURE — 74011250637 HC RX REV CODE- 250/637: Performed by: HOSPITALIST

## 2023-04-04 PROCEDURE — 85025 COMPLETE CBC W/AUTO DIFF WBC: CPT

## 2023-04-04 RX ORDER — ACETAMINOPHEN 325 MG/1
650 TABLET ORAL
Status: ACTIVE
Start: 2023-04-04

## 2023-04-04 RX ORDER — GABAPENTIN 400 MG/1
800 CAPSULE ORAL 3 TIMES DAILY
Status: DISPENSED
Start: 2023-04-04

## 2023-04-04 RX ORDER — BUPROPION HYDROCHLORIDE 150 MG/1
150 TABLET, EXTENDED RELEASE ORAL 2 TIMES DAILY
Status: DISPENSED
Start: 2023-04-04

## 2023-04-04 RX ORDER — IPRATROPIUM BROMIDE AND ALBUTEROL SULFATE 2.5; .5 MG/3ML; MG/3ML
3 SOLUTION RESPIRATORY (INHALATION)
Status: DISPENSED
Start: 2023-04-04

## 2023-04-04 RX ORDER — BUDESONIDE 0.5 MG/2ML
500 INHALANT ORAL
Status: DISPENSED
Start: 2023-04-04

## 2023-04-04 RX ORDER — ROSUVASTATIN CALCIUM 10 MG/1
5 TABLET, COATED ORAL
Status: DISPENSED
Start: 2023-04-04

## 2023-04-04 RX ORDER — ONDANSETRON 2 MG/ML
4 INJECTION INTRAMUSCULAR; INTRAVENOUS
Status: ACTIVE
Start: 2023-04-04

## 2023-04-04 RX ORDER — ONDANSETRON 4 MG/1
4 TABLET, ORALLY DISINTEGRATING ORAL
Status: ACTIVE
Start: 2023-04-04

## 2023-04-04 RX ORDER — SODIUM CHLORIDE 0.9 % (FLUSH) 0.9 %
5-40 SYRINGE (ML) INJECTION AS NEEDED
Status: DISPENSED
Start: 2023-04-04

## 2023-04-04 RX ORDER — POLYETHYLENE GLYCOL 3350 17 G/17G
17 POWDER, FOR SOLUTION ORAL DAILY PRN
Status: DISPENSED
Start: 2023-04-04

## 2023-04-04 RX ORDER — ACETAMINOPHEN 650 MG/1
650 SUPPOSITORY RECTAL
Status: ACTIVE
Start: 2023-04-04

## 2023-04-04 RX ORDER — LIDOCAINE 4 G/100G
1 PATCH TOPICAL EVERY 24 HOURS
Status: DISPENSED
Start: 2023-04-04

## 2023-04-04 RX ORDER — ARFORMOTEROL TARTRATE 15 UG/2ML
15 SOLUTION RESPIRATORY (INHALATION)
Status: DISPENSED
Start: 2023-04-04

## 2023-04-04 RX ORDER — IBUPROFEN 200 MG
4 TABLET ORAL AS NEEDED
Status: ACTIVE
Start: 2023-04-04

## 2023-04-04 RX ORDER — SODIUM CHLORIDE 9 MG/ML
250 INJECTION, SOLUTION INTRAVENOUS AS NEEDED
Status: DISPENSED
Start: 2023-04-04

## 2023-04-04 RX ORDER — SODIUM CHLORIDE 0.9 % (FLUSH) 0.9 %
5-40 SYRINGE (ML) INJECTION EVERY 8 HOURS
Status: DISPENSED
Start: 2023-04-04

## 2023-04-04 RX ORDER — EZETIMIBE 10 MG/1
10 TABLET ORAL DAILY
Status: DISPENSED
Start: 2023-04-05

## 2023-04-04 RX ORDER — INSULIN LISPRO 100 [IU]/ML
INJECTION, SOLUTION INTRAVENOUS; SUBCUTANEOUS
Status: ACTIVE
Start: 2023-04-04

## 2023-04-04 RX ORDER — SODIUM CHLORIDE 9 MG/ML
100 INJECTION, SOLUTION INTRAVENOUS CONTINUOUS
Status: DISPENSED
Start: 2023-04-04 | End: 2023-04-04

## 2023-04-04 RX ADMIN — SODIUM CHLORIDE, PRESERVATIVE FREE 10 ML: 5 INJECTION INTRAVENOUS at 22:01

## 2023-04-04 RX ADMIN — IPRATROPIUM BROMIDE AND ALBUTEROL SULFATE 3 ML: .5; 3 SOLUTION RESPIRATORY (INHALATION) at 23:19

## 2023-04-04 RX ADMIN — SODIUM CHLORIDE 250 ML: 9 INJECTION, SOLUTION INTRAVENOUS at 18:25

## 2023-04-04 RX ADMIN — BUPROPION HYDROCHLORIDE 150 MG: 150 TABLET, FILM COATED, EXTENDED RELEASE ORAL at 20:03

## 2023-04-04 RX ADMIN — CEFTRIAXONE SODIUM 500 MG: 500 INJECTION, POWDER, FOR SOLUTION INTRAMUSCULAR; INTRAVENOUS at 20:00

## 2023-04-04 RX ADMIN — ROSUVASTATIN 5 MG: 10 TABLET, FILM COATED ORAL at 21:57

## 2023-04-04 RX ADMIN — PANTOPRAZOLE SODIUM 40 MG: 40 INJECTION, POWDER, FOR SOLUTION INTRAVENOUS at 18:25

## 2023-04-04 NOTE — ED TRIAGE NOTES
Pt c/o dark tarry stools since yesterday, admitted a week ago for the same, denies abd pain, denies n/v, feels weak , hx of AVM, pt with hx of MI last week

## 2023-04-04 NOTE — ED PROVIDER NOTES
Patient is a 70-year-old female present emergency department for weakness, fatigue, abdominal pain. Patient's son who is providing HPI states that she was recently discharged from [facility] after she had had multiple endoscopies requiring cauterization for GI bleed. Initially the first time patient was at [facility] she had multiple areas in her stomach requiring embolization subsequently patient returned to [facility] had additional areas requiring embolization as well as the small intestine. Patient's son states that while she was at [facility] she had an MI and required to have a heart catheterization but was not able to get stents. Patient was supposed to have another endoscopy but she was not medically cleared to have this because of the MI. Patient was discharged last Tuesday and son states that she is continuing to have black tarry stools now with increased weakness, fatigue shortness of breath and chest discomfort.        Past Medical History:   Diagnosis Date    Atopic dermatitis 04/07/2016    AVM (arteriovenous malformation) of duodenum, acquired     AVM (arteriovenous malformation) of stomach, acquired     Breast cancer (HCC)     COPD (chronic obstructive pulmonary disease) with chronic bronchitis ([facility] 07/26/2018    Essential hypertension 03/31/2016    EVER (generalized anxiety disorder) 10/11/2018    GI bleed 03/2022    NSTEMI (non-ST elevated myocardial infarction) ([address] 03/2023    Tremor of face and hands 10/11/2018       Past Surgical History:   Procedure Laterality Date    COLONOSCOPY N/A 7/9/2021    COLONOSCOPY performed by Varsha Reyna MD at [facility] ENDOSCOPY    HX BREAST LUMPECTOMY      HX HYSTERECTOMY           Family History:   Problem Relation Age of Onset    Diabetes Father     OSTEOARTHRITIS Sister     Diabetes Sister     Gall Bladder Disease Sister        Social History     Socioeconomic History    Marital status:      Spouse name: Not on file    Number of children: Not on file    Years of education: Not on file    Highest education level: Not on file   Occupational History    Not on file   Tobacco Use    Smoking status: Every Day     Packs/day: 1.50     Types: Cigarettes    Smokeless tobacco: Never   Vaping Use    Vaping Use: Never used   Substance and Sexual Activity    Alcohol use: No    Drug use: No    Sexual activity: Not Currently   Other Topics Concern    Not on file   Social History Narrative    Not on file     Social Determinants of Health     Financial Resource Strain: Not on file   Food Insecurity: Not on file   Transportation Needs: Not on file   Physical Activity: Not on file   Stress: Not on file   Social Connections: Not on file   Intimate Partner Violence: Not on file   Housing Stability: Not on file         ALLERGIES: Statins-hmg-coa reductase inhibitors    Review of Systems   Constitutional:  Positive for activity change, appetite change and fatigue. Respiratory:  Positive for chest tightness and shortness of breath. Gastrointestinal:  Positive for abdominal pain and blood in stool. Neurological:  Positive for weakness and light-headedness. All other systems reviewed and are negative. Vitals:    04/04/23 1108   BP: 107/68   Pulse: 83   Resp: 20   Temp: 97.6 °F (36.4 °C)   SpO2: 99%            Physical Exam  Vitals and nursing note reviewed. Constitutional:       Appearance: She is ill-appearing. HENT:      Head: Normocephalic and atraumatic. Eyes:      Extraocular Movements: Extraocular movements intact. Pupils: Pupils are equal, round, and reactive to light. Cardiovascular:      Rate and Rhythm: Normal rate and regular rhythm. Pulses: Normal pulses. Heart sounds: Normal heart sounds. Pulmonary:      Effort: Pulmonary effort is normal.      Breath sounds: Normal breath sounds. Abdominal:      General: Abdomen is protuberant. Tenderness: There is generalized abdominal tenderness. Musculoskeletal:         General: Normal range of motion.       Cervical back: Normal range of motion and neck supple. Skin:     General: Skin is warm. Neurological:      General: No focal deficit present. Mental Status: She is oriented to person, place, and time. Psychiatric:         Mood and Affect: Mood normal.         Behavior: Behavior normal.        Medical Decision Making  GI bleed. 72-year-old female present emergency department with melanotic stools patient has a recent history of multiple endoscopies for the same. Patient's hemoglobin 7.1 today slightly elevated troponin patient will require admission for further work-up. Amount and/or Complexity of Data Reviewed  Labs: ordered. ECG/medicine tests: ordered. ED Course as of 04/04/23 1724   Tue Apr 04, 2023   1131 EKG time 11:05 AM  Normal sinus rhythm rate of 80 with narrow QRS, normal axis, nonspecific ST-T wave changes with biphasic T waves in V4 and V5, no ST elevations or depressions [CC]      ED Course User Index  [CC] [place], [place], DO       Procedures    Perfect Serve Consult for Admission  5:36 PM    ED Room Number: ER29/29  Patient Name and age:  Sindi Luis 79 y.o.  female  Working Diagnosis:   1. Gastrointestinal hemorrhage with melena    2. Symptomatic anemia        COVID-19 Suspicion:  no  Sepsis present:  no  Reassessment needed: no  Code Status:  Full Code  Readmission: yes  Isolation Requirements:  no  Recommended Level of Care:  telemetry  Department: [facility] Adult ED - 21     Total critical care time spent exclusive of procedures:  52 minutes.

## 2023-04-04 NOTE — H&P
History and Physical    Date of Service:  4/4/2023  Primary Care Provider: Ciera Otoole NP  Source of information: The patient    Chief Complaint: black tarry stools and weakness       History of Presenting Illness:   Suki Estrada is a 79 y.o. female who presents with black stools and generalized weakness  Pt is a poor historian     Patient is a 80-year-old female present emergency department for weakness, fatigue, abdominal pain and black stools. Patient's son who is providing HPI states that she was recently discharged from [facility] after she had had multiple endoscopies requiring cauterization for GI bleed. Initially the first time patient was at [facility] she had multiple areas in her stomach requiring embolization subsequently patient returned to [facility] had additional areas requiring embolization as well as the small intestine. Patient's son states that while she was at [facility] she had an MI and required to have a heart catheterization but was not able to get stents. Patient was supposed to have another endoscopy but she was not medically cleared to have this because of the MI. Patient was discharged last Tuesday , pt is taking baby aspirin on discharge. Son states that she is continuing to have black tarry stools now with increased weakness, fatigue shortness of breath and chest discomfort.  + back pain  Denied abd pain        REVIEW OF SYSTEMS:  General: HPI, no changes of weight  HEENT: no headache, no vision changes, no nose discharge, no hearing changes   RES: no wheezing, no cough, no sob  CVS: hpi.    Muscular: no joint swelling, no muscle pain, no leg swelling  Skin: no rash, no itching   GI: hpi,   : no dysuria, no hematuria  Hemo: no gum bleeding, no petechial   Neuro: no sensation changes, no focal weakness   Endo: no polydipsia   Psych: denied depression      Past Medical History:   Diagnosis Date    Atopic dermatitis 04/07/2016    AVM (arteriovenous malformation) of duodenum, acquired     AVM (arteriovenous malformation) of stomach, acquired     Breast cancer (HCC)     COPD (chronic obstructive pulmonary disease) with chronic bronchitis [facility] 07/26/2018    Essential hypertension 03/31/2016    EVER (generalized anxiety disorder) 10/11/2018    GI bleed 03/2022    NSTEMI (non-ST elevated myocardial infarction) ([facility] 03/2023    Tremor of face and hands 10/11/2018      Past Surgical History:   Procedure Laterality Date    COLONOSCOPY N/A 7/9/2021    COLONOSCOPY performed by Mario Fair MD at [facility] ENDOSCOPY    HX BREAST LUMPECTOMY      HX HYSTERECTOMY       Prior to Admission medications    Medication Sig Start Date End Date Taking? Authorizing Provider   gabapentin (NEURONTIN) 800 mg tablet Take 1 Tablet by mouth three (3) times daily. 3/30/23   Jennifer Mcgregor NP   pantoprazole (PROTONIX) 40 mg tablet Take 1 Tablet by mouth two (2) times a day. 3/8/23   Jennifer Mcgregor NP   ezetimibe (ZETIA) 10 mg tablet Take 1 Tablet by mouth daily. 3/8/23   Jennifer Mcgregor NP   rosuvastatin (CRESTOR) 5 mg tablet Take 1 Tablet by mouth nightly. 3/8/23   Jennifer Mcgregor NP   metoprolol succinate (TOPROL-XL) 25 mg XL tablet Take 0.5 Tablets by mouth daily. 3/6/23   Jennifer Mcgregor NP   LORazepam (ATIVAN) 1 mg tablet Take 1 Tablet by mouth nightly as needed for Anxiety. Max Daily Amount: 1 mg. 2/7/23   Jennifer Mcgregor NP   lidocaine (LIDODERM) 5 % Apply patch to the affected area for 12 hours a day and remove for 12 hours a day. 12/6/22   Jennifer Mcgregor NP   buPROPion SR (WELLBUTRIN SR) 150 mg SR tablet Take 1 Tablet by mouth two (2) times a day.  9/6/22   Jennifer Mcgregor NP   budesonide-formoteroL (Symbicort) 160-4.5 mcg/actuation HFAA INHALE TWO PUFFS BY MOUTH TWICE A DAY 6/8/22   Musa Mcgregor NP   albuterol-ipratropium (DUO-NEB) 2.5 mg-0.5 mg/3 ml nebu INHALE THREE MILLILITERS VIA NEBULIZATION BY MOUTH EVERY 4 HOURS AS NEEDED FOR WHEEZING 6/7/22   Jennifer Mcgregor, NP albuterol (PROVENTIL HFA, VENTOLIN HFA, PROAIR HFA) 90 mcg/actuation inhaler INHALE TWO PUFFS BY MOUTH EVERY 4 HOURS AS NEEDED FOR WHEEZING AND  SHORTNESS OF BREATH 9/6/21   Perry Mcgregor NP   cholecalciferol (VITAMIN D3) (1000 Units /25 mcg) tablet Take 1,000 Units by mouth daily. Provider, Historical   Blood-Glucose Meter monitoring kit Monitor BG once daily and as needed  Dx: E11.42  Provide with glucometer that insurance covers. 3/22/19   Ashlee Chen NP   lancets misc Monitor BG once daily and as needed  Dx: E11.42  Provide with glucometer/lancets that insurance covers. 3/22/19   Perry Mcgregor NP   glucose blood VI test strips (BLOOD GLUCOSE TEST) strip Monitor BG once daily and as needed  Dx: E11.42  Provide with glucometer/strips that insurance covers. 3/22/19   Perry Mcgregor NP     Allergies   Allergen Reactions    Statins-Hmg-Coa Reductase Inhibitors Myalgia      Family History   Problem Relation Age of Onset    Diabetes Father     OSTEOARTHRITIS Sister     Diabetes Sister     Gall Bladder Disease Sister       Social History:  reports that she has been smoking cigarettes. She has been smoking an average of 1.5 packs per day. She has never used smokeless tobacco. She reports that she does not drink alcohol and does not use drugs. Social Determinants of Health     Tobacco Use: High Risk    Smoking Tobacco Use: Every Day    Smokeless Tobacco Use: Never    Passive Exposure: Not on file   Alcohol Use: Not on file   Financial Resource Strain: Not on file   Food Insecurity: Not on file   Transportation Needs: Not on file   Physical Activity: Not on file   Stress: Not on file   Social Connections: Not on file   Intimate Partner Violence: Not on file   Depression: Not at risk    PHQ-2 Score: 0   Housing Stability: Not on file        Medications were reconciled to the best of my ability given all available resources at the time of admission. Route is PO if not otherwise noted.      Family and social history were personally reviewed, all pertinent and relevant details are outlined as above. Objective:   Visit Vitals  BP (!) 115/52   Pulse 75   Temp 97.7 °F (36.5 °C)   Resp 20   Ht 5' 3\" (1.6 m)   Wt 66.2 kg (146 lb)   SpO2 96%   BMI 25.86 kg/m²      O2 Device: None (Room air)    PHYSICAL EXAM:   General: Alert x oriented x 3, awake, no acute distress, pale   HEENT: PEERL, EOMI, moist mucus membranes  Neck: Supple, no JVD, no meningeal signs  Chest: Clear to auscultation bilaterally   CVS: RRR, S1 S2 heard, no murmurs/rubs/gallops  Abd: Soft, non-tender, non-distended, +bowel sounds   Ext: No clubbing, no cyanosis, no edema  Neuro/Psych: Pleasant mood and affect, CN 2-12 grossly intact, sensory grossly within normal limit, Strength 5/5 in all extremities, DTR 1+ x 4  Cap refill: Brisk, less than 3 seconds  Pulses: 2+, symmetric in all extremities  Skin: Warm, dry, without rashes or lesions    Data Review:   I have independently reviewed and interpreted patient's lab and all other diagnostic data    Abnormal Labs Reviewed   CBC WITH AUTOMATED DIFF - Abnormal; Notable for the following components:       Result Value    WBC 16.7 (*)     RBC 2.50 (*)     HGB 7.1 (*)     HCT 23.4 (*)     RDW 18.4 (*)     NEUTROPHILS 89 (*)     LYMPHOCYTES 3 (*)     IMMATURE GRANULOCYTES 1 (*)     ABS. NEUTROPHILS 14.8 (*)     ABS. LYMPHOCYTES 0.5 (*)     ABS. MONOCYTES 1.2 (*)     ABS. IMM. GRANS. 0.2 (*)     All other components within normal limits   METABOLIC PANEL, COMPREHENSIVE - Abnormal; Notable for the following components:    Sodium 132 (*)     Glucose 206 (*)     BUN 24 (*)     Creatinine 1.76 (*)     eGFR 31 (*)     ALT (SGPT) 11 (*)     Alk.  phosphatase 580 (*)     Albumin 2.5 (*)     Globulin 4.1 (*)     A-G Ratio 0.6 (*)     All other components within normal limits   TROPONIN-HIGH SENSITIVITY - Abnormal; Notable for the following components:    Troponin-High Sensitivity 97 (*)     All other components within normal limits   OCCULT BLOOD, STOOL - Abnormal; Notable for the following components:    Occult blood, stool Positive (*)     All other components within normal limits       All Micro Results       Procedure Component Value Units Date/Time    CULTURE, URINE [[id number]] Collected: 04/04/23 1717    Order Status: Sent Specimen: Urine from Clean catch Updated: 04/04/23 1849    URINE CULTURE HOLD SAMPLE [[id number]] Collected: 04/04/23 1717    Order Status: Completed Specimen: Urine Updated: 04/04/23 1727     Urine culture hold       Urine on hold in Microbiology dept for 2 days. If unpreserved urine is submitted, it cannot be used for addtional testing after 24 hours, recollection will be required. IMAGING:   No orders to display        ECG/ECHO:    Results for orders placed or performed during the hospital encounter of 04/04/23   EKG, 12 LEAD, INITIAL   Result Value Ref Range    Ventricular Rate 80 BPM    Atrial Rate 80 BPM    P-R Interval 138 ms    QRS Duration 88 ms    Q-T Interval 384 ms    QTC Calculation (Bezet) 442 ms    Calculated P Axis 59 degrees    Calculated R Axis 38 degrees    Calculated T Axis -14 degrees    Diagnosis       Normal sinus rhythm  Possible Inferior infarct , age undetermined  T wave abnormality, consider lateral ischemia  Abnormal ECG  When compared with ECG of 28-FEB-2006 14:04,  Previous ECG has undetermined rhythm, needs review  Non-specific change in ST segment in Anterior leads  T wave inversion now evident in Anterolateral leads  QT has shortened            Notes reviewed from all clinical/nonclinical/nursing services involved in patient's clinical care. Care coordination discussions were held with appropriate clinical/nonclinical/ nursing providers based on care coordination needs. Assessment:   Given the patient's current clinical presentation, there is a high level of concern for decompensation if discharged from the emergency department.  Complex decision making was performed, which includes reviewing the patient's available past medical records, laboratory results, and imaging studies. Active Problems:    GIB (gastrointestinal bleeding) (4/4/2023)        Plan:     GIB: known to have AVMs per previous endoscopy. -continue iv ppi bid  -transfuse one unit tonight given her h/o NSTEMI, anemic symptoms and may still bleeding  -trending cbc   -GI consulted, discussed with on call Dr. Eboni Hamilton, keep npo after midnight, likely will have EGD first    2. Acute on chronic anemia due to blood lost  Plan as above     3. CAD:  Trending troponin  May keep hb> 8-9 if ACS     4 DM: SSI    5. Copd   Continue nebs     6: leukocytosis  No fever,   UA has large leukocyte esterase,   But also mod epithelial cell  Will empirically cover rocephin for UTI  Following cultures          DIET: DIET NPO   ISOLATION PRECAUTIONS: There are currently no Active Isolations  CODE STATUS: Full Code   DVT PROPHYLAXIS: SCDs  FUNCTIONAL STATUS PRIOR TO HOSPITALIZATION: Fully active and ambulatory; able to carry on all self-care without restriction. Ambulatory status/function: By self   EARLY MOBILITY ASSESSMENT: Recommend an assessment from physical therapy and/or occupational therapy  ANTICIPATED DISCHARGE: Greater than 48 hours. ANTICIPATED DISPOSITION: Home with Home Healthcare  EMERGENCY CONTACT/SURROGATE DECISION MAKER: pt makes her own decision     CRITICAL CARE WAS PERFORMED FOR THIS ENCOUNTER: YES. I had a face to face encounter with the patient, reviewed and interpreted patient data including clinical events, labs, images, vital signs, I/O's, and examined patient. I have discussed the case and the plan and management of the patient's care with the consulting services, the bedside nurses and necessary ancillary providers. This patient has a high probability of imminent, clinically significant deterioration, which requires the highest level of preparedness to intervene urgently.  I participated in the decision-making and personally managed or directed the management of the following life and organ supporting interventions that required my frequent assessment to treat or prevent imminent deterioration. I personally spent 50  minutes of critical care time. This is time spent at this critically ill patient's bedside actively involved in patient care as well as the coordination of care and discussions with the patient's family. This does not include any procedural time which has been billed separately. Signed By: Sally Pradhan MD     April 4, 2023         Please note that this dictation may have been completed with Dragon, the [employer] voice recognition software. Quite often unanticipated grammatical, syntax, homophones, and other interpretive errors are inadvertently transcribed by the computer software. Please disregard these errors. Please excuse any errors that have escaped final proofreading.

## 2023-04-04 NOTE — ED NOTES
RN took patient to the bathroom and placed a hat under toilet seat lid. Pt urinated but did not have a bowel movement. Pt changed into gown. Urine sent to lab on hold.

## 2023-04-04 NOTE — CONSENT
Informed Consent for Blood Component Transfusion Note    I have discussed with the patient the rationale for blood component transfusion; its benefits in treating or preventing fatigue, organ damage, or death; and its risk which includes mild transfusion reactions, rare risk of blood borne infection, or more serious but rare reactions. I have discussed the alternatives to transfusion, including the risk and consequences of not receiving transfusion. The patient had an opportunity to ask questions and had agreed to proceed with transfusion of blood components.     Electronically signed by Tomasa Arce MD on 4/4/23 at 5:38 PM

## 2023-04-04 NOTE — ED NOTES
Assumed pt care. AOX4. Respiration even and unlabored. Denied acute SOB, HX COPD. Denied chest pain. Pt reported 3 black BM today. Denied nausea or vomiting. Private car

## 2023-04-05 LAB
ABO + RH BLD: NORMAL
ALBUMIN SERPL-MCNC: 2.1 G/DL (ref 3.5–5)
ALBUMIN/GLOB SERPL: 0.6 (ref 1.1–2.2)
ALP SERPL-CCNC: 436 U/L (ref 45–117)
ALT SERPL-CCNC: 10 U/L (ref 12–78)
ANION GAP SERPL CALC-SCNC: 8 MMOL/L (ref 5–15)
AST SERPL-CCNC: 24 U/L (ref 15–37)
ATRIAL RATE: 80 BPM
BACTERIA SPEC CULT: NORMAL
BASOPHILS # BLD: 0 K/UL (ref 0–0.1)
BASOPHILS # BLD: 0 K/UL (ref 0–0.1)
BASOPHILS NFR BLD: 0 % (ref 0–1)
BASOPHILS NFR BLD: 0 % (ref 0–1)
BILIRUB SERPL-MCNC: 1 MG/DL (ref 0.2–1)
BLD PROD TYP BPU: NORMAL
BLOOD GROUP ANTIBODIES SERPL: NORMAL
BPU ID: NORMAL
BUN SERPL-MCNC: 26 MG/DL (ref 6–20)
BUN/CREAT SERPL: 16 (ref 12–20)
CALCIUM SERPL-MCNC: 8.1 MG/DL (ref 8.5–10.1)
CALCULATED P AXIS, ECG09: 59 DEGREES
CALCULATED R AXIS, ECG10: 38 DEGREES
CALCULATED T AXIS, ECG11: -14 DEGREES
CC UR VC: NORMAL
CHLORIDE SERPL-SCNC: 107 MMOL/L (ref 97–108)
CO2 SERPL-SCNC: 19 MMOL/L (ref 21–32)
CREAT SERPL-MCNC: 1.6 MG/DL (ref 0.55–1.02)
CROSSMATCH RESULT,%XM: NORMAL
DIAGNOSIS, 93000: NORMAL
DIFFERENTIAL METHOD BLD: ABNORMAL
DIFFERENTIAL METHOD BLD: ABNORMAL
EOSINOPHIL # BLD: 0.1 K/UL (ref 0–0.4)
EOSINOPHIL # BLD: 0.2 K/UL (ref 0–0.4)
EOSINOPHIL NFR BLD: 1 % (ref 0–7)
EOSINOPHIL NFR BLD: 1 % (ref 0–7)
ERYTHROCYTE [DISTWIDTH] IN BLOOD BY AUTOMATED COUNT: 17.6 % (ref 11.5–14.5)
ERYTHROCYTE [DISTWIDTH] IN BLOOD BY AUTOMATED COUNT: 17.9 % (ref 11.5–14.5)
GLOBULIN SER CALC-MCNC: 3.5 G/DL (ref 2–4)
GLUCOSE BLD STRIP.AUTO-MCNC: 105 MG/DL (ref 65–117)
GLUCOSE BLD STRIP.AUTO-MCNC: 107 MG/DL (ref 65–117)
GLUCOSE BLD STRIP.AUTO-MCNC: 129 MG/DL (ref 65–117)
GLUCOSE BLD STRIP.AUTO-MCNC: 84 MG/DL (ref 65–117)
GLUCOSE SERPL-MCNC: 111 MG/DL (ref 65–100)
HCT VFR BLD AUTO: 24.3 % (ref 35–47)
HCT VFR BLD AUTO: 26 % (ref 35–47)
HGB BLD-MCNC: 7.3 G/DL (ref 11.5–16)
HGB BLD-MCNC: 8.2 G/DL (ref 11.5–16)
IMM GRANULOCYTES # BLD AUTO: 0.1 K/UL (ref 0–0.04)
IMM GRANULOCYTES # BLD AUTO: 0.2 K/UL (ref 0–0.04)
IMM GRANULOCYTES NFR BLD AUTO: 1 % (ref 0–0.5)
IMM GRANULOCYTES NFR BLD AUTO: 1 % (ref 0–0.5)
LYMPHOCYTES # BLD: 0.6 K/UL (ref 0.8–3.5)
LYMPHOCYTES # BLD: 0.7 K/UL (ref 0.8–3.5)
LYMPHOCYTES NFR BLD: 4 % (ref 12–49)
LYMPHOCYTES NFR BLD: 5 % (ref 12–49)
MCH RBC QN AUTO: 28.2 PG (ref 26–34)
MCH RBC QN AUTO: 29.2 PG (ref 26–34)
MCHC RBC AUTO-ENTMCNC: 30 G/DL (ref 30–36.5)
MCHC RBC AUTO-ENTMCNC: 31.5 G/DL (ref 30–36.5)
MCV RBC AUTO: 92.5 FL (ref 80–99)
MCV RBC AUTO: 93.8 FL (ref 80–99)
MONOCYTES # BLD: 1.1 K/UL (ref 0–1)
MONOCYTES # BLD: 1.3 K/UL (ref 0–1)
MONOCYTES NFR BLD: 8 % (ref 5–13)
MONOCYTES NFR BLD: 8 % (ref 5–13)
NEUTS SEG # BLD: 11.7 K/UL (ref 1.8–8)
NEUTS SEG # BLD: 13.5 K/UL (ref 1.8–8)
NEUTS SEG NFR BLD: 85 % (ref 32–75)
NEUTS SEG NFR BLD: 86 % (ref 32–75)
NRBC # BLD: 0 K/UL (ref 0–0.01)
NRBC # BLD: 0 K/UL (ref 0–0.01)
NRBC BLD-RTO: 0 PER 100 WBC
NRBC BLD-RTO: 0 PER 100 WBC
P-R INTERVAL, ECG05: 138 MS
PLATELET # BLD AUTO: 188 K/UL (ref 150–400)
PLATELET # BLD AUTO: 215 K/UL (ref 150–400)
PMV BLD AUTO: 10.9 FL (ref 8.9–12.9)
PMV BLD AUTO: 11.2 FL (ref 8.9–12.9)
POTASSIUM SERPL-SCNC: 3.8 MMOL/L (ref 3.5–5.1)
PROT SERPL-MCNC: 5.6 G/DL (ref 6.4–8.2)
Q-T INTERVAL, ECG07: 384 MS
QRS DURATION, ECG06: 88 MS
QTC CALCULATION (BEZET), ECG08: 442 MS
RBC # BLD AUTO: 2.59 M/UL (ref 3.8–5.2)
RBC # BLD AUTO: 2.81 M/UL (ref 3.8–5.2)
RBC MORPH BLD: ABNORMAL
SERVICE CMNT-IMP: ABNORMAL
SERVICE CMNT-IMP: NORMAL
SODIUM SERPL-SCNC: 134 MMOL/L (ref 136–145)
SPECIMEN EXP DATE BLD: NORMAL
STATUS OF UNIT,%ST: NORMAL
TROPONIN I SERPL HS-MCNC: 80 NG/L (ref 0–37)
UNIT DIVISION, %UDIV: 0
VENTRICULAR RATE, ECG03: 80 BPM
WBC # BLD AUTO: 13.7 K/UL (ref 3.6–11)
WBC # BLD AUTO: 15.8 K/UL (ref 3.6–11)

## 2023-04-05 PROCEDURE — C9113 INJ PANTOPRAZOLE SODIUM, VIA: HCPCS | Performed by: HOSPITALIST

## 2023-04-05 PROCEDURE — 74011000250 HC RX REV CODE- 250: Performed by: HOSPITALIST

## 2023-04-05 PROCEDURE — 74011250636 HC RX REV CODE- 250/636: Performed by: HOSPITALIST

## 2023-04-05 PROCEDURE — 85025 COMPLETE CBC W/AUTO DIFF WBC: CPT

## 2023-04-05 PROCEDURE — 80053 COMPREHEN METABOLIC PANEL: CPT

## 2023-04-05 PROCEDURE — 65270000046 HC RM TELEMETRY

## 2023-04-05 PROCEDURE — 82962 GLUCOSE BLOOD TEST: CPT

## 2023-04-05 PROCEDURE — 74011000258 HC RX REV CODE- 258: Performed by: HOSPITALIST

## 2023-04-05 PROCEDURE — 94664 DEMO&/EVAL PT USE INHALER: CPT

## 2023-04-05 PROCEDURE — 74011250637 HC RX REV CODE- 250/637: Performed by: HOSPITALIST

## 2023-04-05 PROCEDURE — 94640 AIRWAY INHALATION TREATMENT: CPT

## 2023-04-05 PROCEDURE — 77010033678 HC OXYGEN DAILY

## 2023-04-05 PROCEDURE — 36415 COLL VENOUS BLD VENIPUNCTURE: CPT

## 2023-04-05 PROCEDURE — 84484 ASSAY OF TROPONIN QUANT: CPT

## 2023-04-05 RX ORDER — FLUMAZENIL 0.1 MG/ML
0.2 INJECTION INTRAVENOUS
Start: 2023-04-05 | End: 2023-04-05

## 2023-04-05 RX ORDER — MIDAZOLAM HYDROCHLORIDE 1 MG/ML
.25-5 INJECTION, SOLUTION INTRAMUSCULAR; INTRAVENOUS
Start: 2023-04-05 | End: 2023-04-05

## 2023-04-05 RX ORDER — DEXTROMETHORPHAN/PSEUDOEPHED 2.5-7.5/.8
1.2 DROPS ORAL
Start: 2023-04-05

## 2023-04-05 RX ORDER — SODIUM CHLORIDE 9 MG/ML
50 INJECTION, SOLUTION INTRAVENOUS CONTINUOUS
Start: 2023-04-05 | End: 2023-04-05

## 2023-04-05 RX ORDER — ATROPINE SULFATE 0.1 MG/ML
0.5 INJECTION INTRAVENOUS
Start: 2023-04-05 | End: 2023-04-06

## 2023-04-05 RX ORDER — EPINEPHRINE 0.1 MG/ML
1 INJECTION INTRACARDIAC; INTRAVENOUS
Start: 2023-04-05 | End: 2023-04-06

## 2023-04-05 RX ORDER — NALOXONE HYDROCHLORIDE 0.4 MG/ML
0.4 INJECTION, SOLUTION INTRAMUSCULAR; INTRAVENOUS; SUBCUTANEOUS
Start: 2023-04-05 | End: 2023-04-05

## 2023-04-05 RX ORDER — SODIUM CHLORIDE 0.9 % (FLUSH) 0.9 %
5-40 SYRINGE (ML) INJECTION EVERY 8 HOURS
Start: 2023-04-05

## 2023-04-05 RX ORDER — SODIUM CHLORIDE 0.9 % (FLUSH) 0.9 %
5-40 SYRINGE (ML) INJECTION AS NEEDED
Start: 2023-04-05

## 2023-04-05 RX ORDER — FENTANYL CITRATE 50 UG/ML
25-200 INJECTION, SOLUTION INTRAMUSCULAR; INTRAVENOUS
Start: 2023-04-05 | End: 2023-04-05

## 2023-04-05 RX ADMIN — CEFTRIAXONE SODIUM 1 G: 1 INJECTION, POWDER, FOR SOLUTION INTRAMUSCULAR; INTRAVENOUS at 19:10

## 2023-04-05 RX ADMIN — SODIUM CHLORIDE, PRESERVATIVE FREE 10 ML: 5 INJECTION INTRAVENOUS at 23:14

## 2023-04-05 RX ADMIN — GABAPENTIN 800 MG: 400 CAPSULE ORAL at 16:49

## 2023-04-05 RX ADMIN — POLYETHYLENE GLYCOL 3350 17 G: 17 POWDER, FOR SOLUTION ORAL at 23:12

## 2023-04-05 RX ADMIN — BUDESONIDE 500 MCG: 0.5 INHALANT RESPIRATORY (INHALATION) at 08:14

## 2023-04-05 RX ADMIN — GABAPENTIN 800 MG: 400 CAPSULE ORAL at 23:12

## 2023-04-05 RX ADMIN — IPRATROPIUM BROMIDE AND ALBUTEROL SULFATE 3 ML: .5; 3 SOLUTION RESPIRATORY (INHALATION) at 06:42

## 2023-04-05 RX ADMIN — SODIUM CHLORIDE, PRESERVATIVE FREE 10 ML: 5 INJECTION INTRAVENOUS at 09:09

## 2023-04-05 RX ADMIN — EZETIMIBE 10 MG: 10 TABLET ORAL at 09:08

## 2023-04-05 RX ADMIN — ROSUVASTATIN 5 MG: 10 TABLET, FILM COATED ORAL at 23:12

## 2023-04-05 RX ADMIN — GABAPENTIN 800 MG: 400 CAPSULE ORAL at 09:08

## 2023-04-05 RX ADMIN — ARFORMOTEROL TARTRATE 15 MCG: 15 SOLUTION RESPIRATORY (INHALATION) at 08:14

## 2023-04-05 RX ADMIN — BUDESONIDE 500 MCG: 0.5 INHALANT RESPIRATORY (INHALATION) at 21:59

## 2023-04-05 RX ADMIN — BUPROPION HYDROCHLORIDE 150 MG: 150 TABLET, FILM COATED, EXTENDED RELEASE ORAL at 17:49

## 2023-04-05 RX ADMIN — SODIUM CHLORIDE 40 MG: 9 INJECTION INTRAMUSCULAR; INTRAVENOUS; SUBCUTANEOUS at 06:42

## 2023-04-05 RX ADMIN — SODIUM CHLORIDE 40 MG: 9 INJECTION INTRAMUSCULAR; INTRAVENOUS; SUBCUTANEOUS at 17:49

## 2023-04-05 RX ADMIN — ARFORMOTEROL TARTRATE 15 MCG: 15 SOLUTION RESPIRATORY (INHALATION) at 21:59

## 2023-04-05 NOTE — PROGRESS NOTES
Hospitalist Progress Note  Chayito Farfan MD  Answering service: [phone] from in house phone        Date of Service:  2023  NAME:  Jen Hudson  :  1955  MRN:  [id number]      Admission Summary:     Patient is a 15-year-old female who is a poor historian present emergency department for weakness, fatigue, abdominal pain and black stools. Patient's son who is providing HPI states that she was recently discharged from [address] [place] [address] after she had had multiple endoscopies requiring cauterization for GI bleed. Initially the first time patient was at [address] [place] [address] she had multiple areas in her stomach requiring embolization subsequently patient returned to [address] [place] [address] had additional areas requiring embolization as well as the small intestine. Patient's son states that while she was at [address] [place] [address] she had an MI and required to have a heart catheterization but was not able to get stents. Patient was supposed to have another endoscopy but she was not medically cleared to have this because of the MI. Patient was discharged last Tuesday , pt is taking baby aspirin on discharge. Son states that she is continuing to have black tarry stools now with increased weakness, fatigue shortness of breath and chest discomfort.  + back pain  Denied abd pain      Interval history / Subjective:     Patient is seen and examined in ER.   Complains of generalized weakness, last bowel movement was yesterday, no abdominal pain, nausea or vomiting     Assessment & Plan:     Acute recurrent GI blood loss anemai with hx of AVM  -Continue on iv protonix   -Hgb trending down to 7.3  -monitor H/H and transfuse for Hgb <7.0  -GI is consulted    Hx of CAD  -No left-sided chest pain  -continue crestor  -metoprolol on hold due to soft BP  -not on antiplatelet     G8YU  -Last hemoglobin A1c was 5.6 on 3/17  -continue sliding scale and monitor finger stick glucose     COPD  -Not bronchospastic, saturation from 94 to 98% on room air  -Continue on pulmicort, brovana, duo neb, monitor pulse ox and as needed oxygen support    Mild hyponatremia likely due to intravascular volume depletion  -Sodium 134  -Repeat BMP in a.m. Leukocytosis due to UTI  -On IV ceftriaxone  -Follow-up on urine culture  -Repeat CBC in a.m. Hx of EVER  -stable  -continue home Wellbutrin        Code status: Full Code  Prophylaxis: SCD  Care Plan discussed with: Patient  Anticipated Disposition: Home with family 24-48 hrs      Hospital Problems  Date Reviewed: 3/20/2023            Codes Class Noted POA    GIB (gastrointestinal bleeding) ICD-10-CM: K92.2  ICD-9-CM: 578.9  4/4/2023 Unknown                 Vital Signs:    Last 24hrs VS reviewed since prior progress note.  Most recent are:  Visit Vitals  BP (!) 100/52   Pulse 76   Temp 97.8 °F (36.6 °C)   Resp 22   Ht 5' 3\" (1.6 m)   Wt 66.2 kg (146 lb)   SpO2 97%   BMI 25.86 kg/m²         Intake/Output Summary (Last 24 hours) at 4/5/2023 0900  Last data filed at 4/4/2023 2239  Gross per 24 hour   Intake 700 ml   Output --   Net 700 ml        Physical Examination:     I had a face to face encounter with this patient and independently examined them on 4/5/2023 as outlined below:          General : alert x 3, awake, no acute distress,   HEENT: PEERL, EOMI, moist mucus membrane, TM clear  Neck: supple, no JVD, no meningeal signs  Chest: Clear to auscultation bilaterally   CVS: S1 S2 heard, Capillary refill less than 2 seconds  Abd: soft/ non tender, non distended, BS physiological,   Ext: no clubbing, no cyanosis, no edema, brisk 2+ DP pulses  Neuro/Psych: pleasant mood and affect, CN 2-12 grossly intact, sensory grossly within normal limit, Strength 5/5 in all extremities, DTR 1+ x 4  Skin: warm            Data Review:    Review and/or order of clinical lab test  Review and/or order of tests in the radiology section of CPT  Review and/or order of tests in the medicine section of CPT    I have independently reviewed and interpreted patient's lab and all other diagnostic data    Notes reviewed from all clinical/nonclinical/nursing services involved in patient's clinical care. Care coordination discussions were held with appropriate clinical/nonclinical/ nursing providers based on care coordination needs. Labs:     Recent Labs     04/05/23  0640 04/05/23  0000   WBC 13.7* 15.8*   HGB 7.3* 8.2*   HCT 24.3* 26.0*    215     Recent Labs     04/05/23  0640 04/04/23  1130   * 132*   K 3.8 3.9    102   CO2 19* 24   BUN 26* 24*   CREA 1.60* 1.76*   * 206*   CA 8.1* 9.2     Recent Labs     04/05/23  0640 04/04/23  1130   ALT 10* 11*   * 580*   TBILI 1.0 0.8   TP 5.6* 6.6   ALB 2.1* 2.5*   GLOB 3.5 4.1*     No results for input(s): INR, PTP, APTT, INREXT in the last 72 hours. No results for input(s): FE, TIBC, PSAT, FERR in the last 72 hours. Lab Results   Component Value Date/Time    Folate 7.5 07/07/2021 11:21 AM      No results for input(s): PH, PCO2, PO2 in the last 72 hours. No results for input(s): CPK, CKNDX, TROIQ in the last 72 hours.     No lab exists for component: CPKMB  Lab Results   Component Value Date/Time    Cholesterol, total 114 03/17/2023 01:30 PM    HDL Cholesterol 51 03/17/2023 01:30 PM    LDL, calculated 40.6 03/17/2023 01:30 PM    Triglyceride 112 03/17/2023 01:30 PM    CHOL/HDL Ratio 2.2 03/17/2023 01:30 PM     Lab Results   Component Value Date/Time    Glucose (POC) 129 (H) 04/05/2023 08:11 AM     Lab Results   Component Value Date/Time    Color YELLOW 02/07/2023 11:04 AM    Appearance CLOUDY (A) 02/07/2023 11:04 AM    Specific gravity 1.009 02/07/2023 11:04 AM    pH (UA) 5.5 02/07/2023 11:04 AM    Protein Negative 02/07/2023 11:04 AM    Glucose Negative 02/07/2023 11:04 AM    Ketone Negative 02/07/2023 11:04 AM    Bilirubin Negative 02/07/2023 11:04 AM    Urobilinogen 0.2 02/07/2023 11:04 AM    Nitrites Negative 02/07/2023 11:04 AM    Leukocyte Esterase LARGE (A) 02/07/2023 11:04 AM    Epithelial cells MODERATE (A) 02/07/2023 11:04 AM    Bacteria Negative 02/07/2023 11:04 AM    WBC  02/07/2023 11:04 AM    RBC 0-5 02/07/2023 11:04 AM         Medications Reviewed:     Current Facility-Administered Medications   Medication Dose Route Frequency    0.9% sodium chloride infusion 250 mL  250 mL IntraVENous PRN    pantoprazole (PROTONIX) 40 mg in 0.9% sodium chloride 10 mL injection  40 mg IntraVENous Q12H    buPROPion SR (WELLBUTRIN SR) tablet 150 mg  150 mg Oral BID    ezetimibe (ZETIA) tablet 10 mg  10 mg Oral DAILY    gabapentin (NEURONTIN) capsule 800 mg  800 mg Oral TID    rosuvastatin (CRESTOR) tablet 5 mg  5 mg Oral QHS    lidocaine 4 % patch 1 Patch  1 Patch TransDERmal Q24H    glucose chewable tablet 16 g  4 Tablet Oral PRN    glucagon (GLUCAGEN) injection 1 mg  1 mg IntraMUSCular PRN    dextrose 10 % infusion 0-250 mL  0-250 mL IntraVENous PRN    insulin lispro (HUMALOG) injection   SubCUTAneous AC&HS    sodium chloride (NS) flush 5-40 mL  5-40 mL IntraVENous Q8H    sodium chloride (NS) flush 5-40 mL  5-40 mL IntraVENous PRN    acetaminophen (TYLENOL) tablet 650 mg  650 mg Oral Q6H PRN    Or    acetaminophen (TYLENOL) suppository 650 mg  650 mg Rectal Q6H PRN    polyethylene glycol (MIRALAX) packet 17 g  17 g Oral DAILY PRN    ondansetron (ZOFRAN ODT) tablet 4 mg  4 mg Oral Q8H PRN    Or    ondansetron (ZOFRAN) injection 4 mg  4 mg IntraVENous Q6H PRN    albuterol-ipratropium (DUO-NEB) 2.5 MG-0.5 MG/3 ML  3 mL Nebulization Q6H PRN    arformoteroL (BROVANA) neb solution 15 mcg  15 mcg Nebulization BID RT    And    budesonide (PULMICORT) 500 mcg/2 ml nebulizer suspension  500 mcg Nebulization BID RT    cefTRIAXone (ROCEPHIN) 500 mg in sterile water (preservative free) 10 mL IV syringe  0.5 g IntraVENous Q24H     Current Outpatient Medications   Medication Sig    gabapentin (NEURONTIN) 800 mg tablet Take 1 Tablet by mouth three (3) times daily. pantoprazole (PROTONIX) 40 mg tablet Take 1 Tablet by mouth two (2) times a day.    ezetimibe (ZETIA) 10 mg tablet Take 1 Tablet by mouth daily. rosuvastatin (CRESTOR) 5 mg tablet Take 1 Tablet by mouth nightly. metoprolol succinate (TOPROL-XL) 25 mg XL tablet Take 0.5 Tablets by mouth daily. LORazepam (ATIVAN) 1 mg tablet Take 1 Tablet by mouth nightly as needed for Anxiety. Max Daily Amount: 1 mg.    lidocaine (LIDODERM) 5 % Apply patch to the affected area for 12 hours a day and remove for 12 hours a day. buPROPion SR (WELLBUTRIN SR) 150 mg SR tablet Take 1 Tablet by mouth two (2) times a day. budesonide-formoteroL (Symbicort) 160-4.5 mcg/actuation HFAA INHALE TWO PUFFS BY MOUTH TWICE A DAY    albuterol-ipratropium (DUO-NEB) 2.5 mg-0.5 mg/3 ml nebu INHALE THREE MILLILITERS VIA NEBULIZATION BY MOUTH EVERY 4 HOURS AS NEEDED FOR WHEEZING    albuterol (PROVENTIL HFA, VENTOLIN HFA, PROAIR HFA) 90 mcg/actuation inhaler INHALE TWO PUFFS BY MOUTH EVERY 4 HOURS AS NEEDED FOR WHEEZING AND  SHORTNESS OF BREATH    cholecalciferol (VITAMIN D3) (1000 Units /25 mcg) tablet Take 1,000 Units by mouth daily. Blood-Glucose Meter monitoring kit Monitor BG once daily and as needed  Dx: E11.42  Provide with glucometer that insurance covers. lancets misc Monitor BG once daily and as needed  Dx: E11.42  Provide with glucometer/lancets that insurance covers. glucose blood VI test strips (BLOOD GLUCOSE TEST) strip Monitor BG once daily and as needed  Dx: E11.42  Provide with glucometer/strips that insurance covers.      ______________________________________________________________________  EXPECTED LENGTH OF STAY: - - -  ACTUAL LENGTH OF STAY:          1                 Taz Nair MD

## 2023-04-05 NOTE — CONSULTS
[facility]                       Cardiology Care Note     [x]Initial Consult     []Progress note    Patient Name: Octavio Rivera - XRP:[id number] - XDO:[id number]  Primary Cardiologist: [address] [place] [address] cardiology  Consulting Cardiologist: Gillian Del [facility] Cardiology Physicians: Berenice Wright MD     Reason for consult: GIB, hx of CAD, afib    HPI:  Octavio Rivera is a 79 y.o. female with PMH significant for COPD on intermittent O2 at home, DM, HTN, Breast cancer, GIB with hx of AVM requiring cauterization in 2021. Pt was admitted to [address] [place] [address] 3/18/23 to 3/29/23 with c/o chest pain and nausea. She was diagnosed with an acute on chronic anemia r/t GIB. She was found to be in afib on presentation with elevated troponin diagnosed with Type II NSTEMI felt to be demand related ischemia d/t anemia. She did have a LHC on 3/23 with a  to RCA and LAD with collaterals, plans for medical management. Echo obtained 3/28 showed EF 55% without valvular disease of significance. She was placed on Amiodarone gtt for AF, converted to SR after [facility] and was d/c home on Amiodarone 200mg daily in NSR. D/T her anemia she was not placed on [address] [place] [address]. She was on ASA 81mg daily. Plans for outpt discussion of Watchman. She required transfusion of 4UN PRBC while admitted with hgb down to 6.8 at time of admission. EGD was deferred as she was not noted to have evidence of clinical bleeding. Plans for outpt follow up with GI. She presented to [facility] with c/o black tarry stools with increased weakness, and fatigue. She reports she had resolution of black stools while at [address] [place] [address], they returned and she was very weak, therefore she felt she needed to come back to the hospital. Hgb on presentation 7.1, transfused 1un PRBC with repeat 8.2, now 7.3. Creatinine 1.76, today 1.60. HS troponin 97, 98, 80. EKG NSR nonspecific ST changes.      SUBJECTIVE:  She currently reports weakness, fatigue. Cant pull herself up in the bed. Denies having chest discomfort, tightness or sob. Denies palpitations. Assessment and Plan   Patient seen on the day of progress note and examined  and agree with Advance Practice Provider (MARV, NP,PA)  assessment and plans. The patient is quite tired but she denies any particular chest pain or shortness of breath. She remains in normal sinus rhythm at this time. Status post cardiac catheterization in March 2023 with  of the RCA and distal LAD with collaterals. EKG abnormal with normal sinus rhythm and nonspecific T wave abnormality in the anterolateral leads and inferior leads. Again cardiac wise asymptomatic. Toprol on hold at this time (she was taking 12.5 mg daily) on account of low blood pressure. Continue statin. Off aspirin. At this time the patient is an acceptable cardiac risk to proceed with EGD as planned. If blood pressure improves above 110 and then resume Toprol XL 12.5 mg daily. She remains in normal sinus rhythm continue amiodarone at this time. Watchman procedure planned as an outpatient with [facility]. No additional cardiac intervention or testing at this time. I will remain available as needed and continue to follow from a distance. Hx of atrial fibrillation 3/2023: She was placed on Amiodarone gtt for AF, converted to SR after [facility] and was d/c home on Amiodarone 200mg daily in NSR. D/T her anemia she was not placed on LIBCAST. She was on ASA 81mg daily. Plans were for her to follow up outpt to discuss possible Watchman in the future after seeing GI. She presented to ED in SR. Hgb at 7.1 on admission. Cont to hold LIBCAST as high HASbled score 4. Continue Amiodarone 200mg daily. CAD: hx of NSTEMI 3/23 with [facility] showing  RCA and LAD with collaterals. Plans for medical management. No stent required. Echo with preserved EF and no significant valvular disease 3/28. HS troponin [id number].  EKG when at [facility] on 3/20 with ST elevation V3, V4, possibly V5. Repeat EKG 3/21 mild ST elevation V2 and V3. EKG on presentation to [address] with nonspecific ST changes anterior leads, T wave inversion anterolateral leads. She is without CP or other anginal equivalent complaints. Would not pursue ischemic work up. GIB: pt presented with recurrent lower HGB and black stools after recent admission at [facility] for similar issues. Required 1UN PRBC with Hgb 8.2. Plans for possible EGD tomorrow. ____________________________________________________________    Cardiac testing              Most recent HS troponins:  Recent Labs     04/05/23  0000 04/04/23  1940 04/04/23  1130   TROPHS 80* 98* 97*       ECG: normal sinus rhythm, nonspecific ST and T waves changes    Review of Systems:    [x]All other systems reviewed and all negative except as written in HPI    [] Patient unable to provide secondary to condition    Past Medical History:   Diagnosis Date    Atopic dermatitis 04/07/2016    AVM (arteriovenous malformation) of duodenum, acquired     AVM (arteriovenous malformation) of stomach, acquired     Breast cancer (HCC)     COPD (chronic obstructive pulmonary disease) with chronic bronchitis ([address] 07/26/2018    Essential hypertension 03/31/2016    EVER (generalized anxiety disorder) 10/11/2018    GI bleed 03/2022    NSTEMI (non-ST elevated myocardial infarction) ([address] 03/2023    Tremor of face and hands 10/11/2018     Past Surgical History:   Procedure Laterality Date    COLONOSCOPY N/A 7/9/2021    COLONOSCOPY performed by Sarika Sethi MD at [facility] ENDOSCOPY    HX BREAST LUMPECTOMY      HX HYSTERECTOMY       Social Hx:  reports that she has been smoking cigarettes. She has been smoking an average of 1.5 packs per day. She has never used smokeless tobacco. She reports that she does not drink alcohol and does not use drugs.   Family Hx: family history includes Diabetes in her father and sister; [place] in her sister; OSTEOARTHRITIS in her sister. Allergies   Allergen Reactions    Statins-Hmg-Coa Reductase Inhibitors Myalgia          OBJECTIVE:  Wt Readings from Last 3 Encounters:   04/04/23 66.2 kg (146 lb)   03/17/23 65.3 kg (144 lb)   02/07/23 71.1 kg (156 lb 12.8 oz)       Intake/Output Summary (Last 24 hours) at 4/5/2023 1114  Last data filed at 4/4/2023 2239  Gross per 24 hour   Intake 700 ml   Output --   Net 700 ml       Physical Exam:    Vitals:   Vitals:    04/05/23 0429 04/05/23 0559 04/05/23 0659 04/05/23 0815   BP: (!) 104/54 120/74 (!) 100/52    Pulse: 73 82 76    Resp: 20 19 22    Temp:   97.8 °F (36.6 °C)    SpO2: 99%  92% 97%   Weight:       Height:         Telemetry: normal sinus rhythm    Gen: Well-developed, well-nourished, in no acute distress  Neck: Supple, No JVD, No Carotid Bruit  Resp: No accessory muscle use, Clear breath sounds, No rales or rhonchi  Card: Regular Rate,Rhythm, Normal S1, S2, No murmurs, rubs or gallop. No thrills. Abd:   Soft, +tenderness, non-distended, BS+   MSK: No cyanosis  Skin: No rashes    Neuro: Moving all four extremities, follows commands appropriately  Psych: Good insight, oriented to person, place, alert, Nml Affect  LE: No edema    Data Review:     Radiology:   XR Results (most recent):  Results from Appointment encounter on 02/07/23    XR CHEST PA LAT    Narrative  EXAM: XR CHEST PA LAT    INDICATION: COPD, shortness of breath, cough    COMPARISON: 8/8/2019    TECHNIQUE: PA and lateral chest views    FINDINGS: The cardiac size is within normal limits. The pulmonary vasculature is  within normal limits. The lungs and pleural spaces are clear. The visualized bones and upper abdomen  are age-appropriate.     Impression  No acute process or change compared the prior exam.      Recent Labs     04/05/23  0640 04/04/23  1130   * 132*   K 3.8 3.9    102   CO2 19* 24   BUN 26* 24*   CREA 1.60* 1.76*   * 206*   CA 8.1* 9.2     Recent Labs     04/05/23  0640 04/05/23  0000   WBC 13.7* 15.8*   HGB 7.3* 8.2*   HCT 24.3* 26.0*    215     Recent Labs     04/05/23  0640 04/04/23  1130   * 580*     No results for input(s): CHOL, LDLC in the last 72 hours.     No lab exists for component: TGL, HDLC,  HBA1C      Current meds:    Current Facility-Administered Medications:     0.9% sodium chloride infusion 250 mL, 250 mL, IntraVENous, PRN, Toney Orozco MD, Last Rate: 15 mL/hr at 04/04/23 1825, 250 mL at 04/04/23 1825    pantoprazole (PROTONIX) 40 mg in 0.9% sodium chloride 10 mL injection, 40 mg, IntraVENous, Q12H, Vianney Rueda MD, 40 mg at 04/05/23 7366    buPROPion SR [facility]) tablet 150 mg, 150 mg, Oral, BID, Vianney Rueda MD, 150 mg at 04/04/23 2003    ezetimibe (ZETIA) tablet 10 mg, 10 mg, Oral, DAILY, Umesh Rueda MD, 10 mg at 04/05/23 0908    gabapentin (NEURONTIN) capsule 800 mg, 800 mg, Oral, TID, Vianney Rueda MD, 800 mg at 04/05/23 0908    rosuvastatin (CRESTOR) tablet 5 mg, 5 mg, Oral, QHS, Umesh Rueda MD, 5 mg at 04/04/23 2157    lidocaine 4 % patch 1 Patch, 1 Patch, TransDERmal, Q24H, Umesh Rueda MD, 1 Patch at 04/04/23 2003    glucose chewable tablet 16 g, 4 Tablet, Oral, PRN, Vianney Rueda MD    glucagon (GLUCAGEN) injection 1 mg, 1 mg, IntraMUSCular, PRN, Vianney Rueda MD    dextrose 10 % infusion 0-250 mL, 0-250 mL, IntraVENous, PRN, Vianney Rueda MD    insulin lispro (HUMALOG) injection, , SubCUTAneous, AC&HS, Vianney Rueda MD    sodium chloride (NS) flush 5-40 mL, 5-40 mL, IntraVENous, Q8H, Umesh Rueda MD, 10 mL at 04/05/23 5409    sodium chloride (NS) flush 5-40 mL, 5-40 mL, IntraVENous, PRN, Vianney Rueda MD    acetaminophen (TYLENOL) tablet 650 mg, 650 mg, Oral, Q6H PRN **OR** acetaminophen (TYLENOL) suppository 650 mg, 650 mg, Rectal, Q6H PRN, Vianney Rueda MD    polyethylene glycol (MIRALAX) packet 17 g, 17 g, Oral, DAILY PRN, Vianney Rueda MD    ondansetron (ZOFRAN ODT) tablet 4 mg, 4 mg, Oral, Q8H PRN **OR** ondansetron (ZOFRAN) injection 4 mg, 4 mg, IntraVENous, Q6H PRN, Toan Rolle MD albuterol-ipratropium (DUO-NEB) 2.5 MG-0.5 MG/3 ML, 3 mL, Nebulization, Q6H PRN, Vianney Rueda MD, 3 mL at 04/05/23 0642    arformoteroL (BROVANA) neb solution 15 mcg, 15 mcg, Nebulization, BID RT, 15 mcg at 04/05/23 0814 **AND** budesonide (PULMICORT) 500 mcg/2 ml nebulizer suspension, 500 mcg, Nebulization, BID RT, Vianney Rueda MD, 500 mcg at 04/05/23 0367    cefTRIAXone (ROCEPHIN) 500 mg in sterile water (preservative free) 10 mL IV syringe, 0.5 g, IntraVENous, Q24H, Vianney Rueda MD, 500 mg at 04/04/23 2000    Current Outpatient Medications:     gabapentin (NEURONTIN) 800 mg tablet, Take 1 Tablet by mouth three (3) times daily. , Disp: 90 Tablet, Rfl: 5    pantoprazole (PROTONIX) 40 mg tablet, Take 1 Tablet by mouth two (2) times a day., Disp: 180 Tablet, Rfl: 3    ezetimibe (ZETIA) 10 mg tablet, Take 1 Tablet by mouth daily. , Disp: 90 Tablet, Rfl: 3    rosuvastatin (CRESTOR) 5 mg tablet, Take 1 Tablet by mouth nightly., Disp: 90 Tablet, Rfl: 3    metoprolol succinate (TOPROL-XL) 25 mg XL tablet, Take 0.5 Tablets by mouth daily. , Disp: 45 Tablet, Rfl: 3    LORazepam (ATIVAN) 1 mg tablet, Take 1 Tablet by mouth nightly as needed for Anxiety.  Max Daily Amount: 1 mg., Disp: 30 Tablet, Rfl: 1    lidocaine (LIDODERM) 5 %, Apply patch to the affected area for 12 hours a day and remove for 12 hours a day., Disp: 30 Patch, Rfl: 5    buPROPion SR (WELLBUTRIN SR) 150 mg SR tablet, Take 1 Tablet by mouth two (2) times a day., Disp: 180 Tablet, Rfl: 3    budesonide-formoteroL (Symbicort) 160-4.5 mcg/actuation HFAA, INHALE TWO PUFFS BY MOUTH TWICE A DAY, Disp: 3 Each, Rfl: 3    albuterol-ipratropium (DUO-NEB) 2.5 mg-0.5 mg/3 ml nebu, INHALE THREE MILLILITERS VIA NEBULIZATION BY MOUTH EVERY 4 HOURS AS NEEDED FOR WHEEZING, Disp: 270 Nebule, Rfl: 5    albuterol (PROVENTIL HFA, VENTOLIN HFA, PROAIR HFA) 90 mcg/actuation inhaler, INHALE TWO PUFFS BY MOUTH EVERY 4 HOURS AS NEEDED FOR WHEEZING AND  SHORTNESS OF BREATH, Disp: 18 g, Rfl: 10 cholecalciferol (VITAMIN D3) (1000 Units /25 mcg) tablet, Take 1,000 Units by mouth daily. , Disp: , Rfl:     Blood-Glucose Meter monitoring kit, Monitor BG once daily and as needed  Dx: E11.42  Provide with glucometer that insurance covers. , Disp: 1 Kit, Rfl: 0    lancets misc, Monitor BG once daily and as needed  Dx: E11.42  Provide with glucometer/lancets that insurance covers. , Disp: 100 Each, Rfl: 11    glucose blood VI test strips (BLOOD GLUCOSE TEST) strip, Monitor BG once daily and as needed  Dx: E11.42  Provide with glucometer/strips that insurance covers. , Disp: 100 Strip, Rfl: Al. Miya Wharton [address], NP    [facility] Cardiology  Call center: (c) [phone]      Yong Nelson NP

## 2023-04-05 NOTE — ED NOTES
Verbal shift change report given to Rome Brenner (oncoming nurse) by Mari Holloway (offgoing nurse). Report included the following information SBAR, Kardex, ED Summary, MAR, and Recent Results.

## 2023-04-05 NOTE — CONSULTS
[address] NOTE  [facility]  [phone] NP in-hospital cell phone M-F until 4:30  After 5pm or on weekends, please call  for physician on call        NAME:  Genoveva Juarez   :   1955   MRN:   [id number]       Referring Physician: Vanna Ozuna    Consult Date: 2023 11:01 AM    Chief Complaint: GIB     History of Present Illness:  Patient is a 79 y.o. who is seen in consultation at the request of Dr. Vanna Ozuna for GIB. Medical history as listed below includes COPD, atrial fibrillation. History provided by patient/chart. She presented for abdominal pain and melena that started yesterday but was happening for almost two weeks during which time she was at [facility] and just discharged. She also has chest pain and shortness of breath. She was at [facility] -3/2 for UGIB from AVMs treated with APC (4units pRBC's) and type II NSTEMI and readmitted soon after until 3/24    EGD 23 at [facility] few non-bleeding AVM's in stomach and duodenum treated with APC  Colonoscopy [address]:unremarkable  Small bowel AVM's     I have reviewed the emergency room note, hospital admission note, notes by all other clinicians who have seen the patient during this hospitalization to date. I have reviewed the problem list and the reason for this hospitalization. I have reviewed the allergies and the medications the patient was taking at home prior to this hospitalization.     PMH:  Past Medical History:   Diagnosis Date    Atopic dermatitis 2016    AVM (arteriovenous malformation) of duodenum, acquired     AVM (arteriovenous malformation) of stomach, acquired     Breast cancer (HCC)     COPD (chronic obstructive pulmonary disease) with chronic bronchitis ([facility] [address] 2018    Essential hypertension 2016    EVER (generalized anxiety disorder) 10/11/2018    GI bleed 2022    [facility] (non-ST elevated [facility] 03/2023    Tremor of face and hands 10/11/2018       PSH:  Past Surgical History:   Procedure Laterality Date    COLONOSCOPY N/A 7/9/2021    COLONOSCOPY performed by Bing Hoffmann MD at [facility] ENDOSCOPY    HX BREAST LUMPECTOMY      HX HYSTERECTOMY         Allergies: Allergies   Allergen Reactions    Statins-Hmg-Coa Reductase Inhibitors Myalgia       Home Medications:  Prior to Admission Medications   Prescriptions Last Dose Informant Patient Reported? Taking? Blood-Glucose Meter monitoring kit   No No   Sig: Monitor BG once daily and as needed  Dx: E11.42  Provide with glucometer that insurance covers. LORazepam (ATIVAN) 1 mg tablet   No No   Sig: Take 1 Tablet by mouth nightly as needed for Anxiety. Max Daily Amount: 1 mg.   albuterol (PROVENTIL HFA, VENTOLIN HFA, PROAIR HFA) 90 mcg/actuation inhaler   No No   Sig: INHALE TWO PUFFS BY MOUTH EVERY 4 HOURS AS NEEDED FOR WHEEZING AND  SHORTNESS OF BREATH   albuterol-ipratropium (DUO-NEB) 2.5 mg-0.5 mg/3 ml nebu   No No   Sig: INHALE THREE MILLILITERS VIA NEBULIZATION BY MOUTH EVERY 4 HOURS AS NEEDED FOR WHEEZING   buPROPion SR (WELLBUTRIN SR) 150 mg SR tablet   No No   Sig: Take 1 Tablet by mouth two (2) times a day. budesonide-formoteroL (Symbicort) 160-4.5 mcg/actuation HFAA   No No   Sig: INHALE TWO PUFFS BY MOUTH TWICE A DAY   cholecalciferol (VITAMIN D3) (1000 Units /25 mcg) tablet   Yes No   Sig: Take 1,000 Units by mouth daily. ezetimibe (ZETIA) 10 mg tablet   No No   Sig: Take 1 Tablet by mouth daily. gabapentin (NEURONTIN) 800 mg tablet   No No   Sig: Take 1 Tablet by mouth three (3) times daily. glucose blood VI test strips (BLOOD GLUCOSE TEST) strip   No No   Sig: Monitor BG once daily and as needed  Dx: E11.42  Provide with glucometer/strips that insurance covers. lancets misc   No No   Sig: Monitor BG once daily and as needed  Dx: E11.42  Provide with glucometer/lancets that insurance covers. lidocaine (LIDODERM) 5 %   No No   Sig: Apply patch to the affected area for 12 hours a day and remove for 12 hours a day. metoprolol succinate (TOPROL-XL) 25 mg XL tablet   No No   Sig: Take 0.5 Tablets by mouth daily. pantoprazole (PROTONIX) 40 mg tablet   No No   Sig: Take 1 Tablet by mouth two (2) times a day. rosuvastatin (CRESTOR) 5 mg tablet   No No   Sig: Take 1 Tablet by mouth nightly.       Facility-Administered Medications: None       Hospital Medications:  Current Facility-Administered Medications   Medication Dose Route Frequency    0.9% sodium chloride infusion 250 mL  250 mL IntraVENous PRN    pantoprazole (PROTONIX) 40 mg in 0.9% sodium chloride 10 mL injection  40 mg IntraVENous Q12H    buPROPion SR (WELLBUTRIN SR) tablet 150 mg  150 mg Oral BID    ezetimibe (ZETIA) tablet 10 mg  10 mg Oral DAILY    gabapentin (NEURONTIN) capsule 800 mg  800 mg Oral TID    rosuvastatin (CRESTOR) tablet 5 mg  5 mg Oral QHS    lidocaine 4 % patch 1 Patch  1 Patch TransDERmal Q24H    glucose chewable tablet 16 g  4 Tablet Oral PRN    glucagon (GLUCAGEN) injection 1 mg  1 mg IntraMUSCular PRN    dextrose 10 % infusion 0-250 mL  0-250 mL IntraVENous PRN    insulin lispro (HUMALOG) injection   SubCUTAneous AC&HS    sodium chloride (NS) flush 5-40 mL  5-40 mL IntraVENous Q8H    sodium chloride (NS) flush 5-40 mL  5-40 mL IntraVENous PRN    acetaminophen (TYLENOL) tablet 650 mg  650 mg Oral Q6H PRN    Or    acetaminophen (TYLENOL) suppository 650 mg  650 mg Rectal Q6H PRN    polyethylene glycol (MIRALAX) packet 17 g  17 g Oral DAILY PRN    ondansetron (ZOFRAN ODT) tablet 4 mg  4 mg Oral Q8H PRN    Or    ondansetron (ZOFRAN) injection 4 mg  4 mg IntraVENous Q6H PRN    albuterol-ipratropium (DUO-NEB) 2.5 MG-0.5 MG/3 ML  3 mL Nebulization Q6H PRN    arformoteroL (BROVANA) neb solution 15 mcg  15 mcg Nebulization BID RT    And    budesonide (PULMICORT) 500 mcg/2 ml nebulizer suspension  500 mcg Nebulization BID RT cefTRIAXone (ROCEPHIN) 500 mg in sterile water (preservative free) 10 mL IV syringe  0.5 g IntraVENous Q24H     Current Outpatient Medications   Medication Sig    gabapentin (NEURONTIN) 800 mg tablet Take 1 Tablet by mouth three (3) times daily. pantoprazole (PROTONIX) 40 mg tablet Take 1 Tablet by mouth two (2) times a day.    ezetimibe (ZETIA) 10 mg tablet Take 1 Tablet by mouth daily. rosuvastatin (CRESTOR) 5 mg tablet Take 1 Tablet by mouth nightly. metoprolol succinate (TOPROL-XL) 25 mg XL tablet Take 0.5 Tablets by mouth daily. LORazepam (ATIVAN) 1 mg tablet Take 1 Tablet by mouth nightly as needed for Anxiety. Max Daily Amount: 1 mg.    lidocaine (LIDODERM) 5 % Apply patch to the affected area for 12 hours a day and remove for 12 hours a day. buPROPion SR (WELLBUTRIN SR) 150 mg SR tablet Take 1 Tablet by mouth two (2) times a day. budesonide-formoteroL (Symbicort) 160-4.5 mcg/actuation HFAA INHALE TWO PUFFS BY MOUTH TWICE A DAY    albuterol-ipratropium (DUO-NEB) 2.5 mg-0.5 mg/3 ml nebu INHALE THREE MILLILITERS VIA NEBULIZATION BY MOUTH EVERY 4 HOURS AS NEEDED FOR WHEEZING    albuterol (PROVENTIL HFA, VENTOLIN HFA, PROAIR HFA) 90 mcg/actuation inhaler INHALE TWO PUFFS BY MOUTH EVERY 4 HOURS AS NEEDED FOR WHEEZING AND  SHORTNESS OF BREATH    cholecalciferol (VITAMIN D3) (1000 Units /25 mcg) tablet Take 1,000 Units by mouth daily. Blood-Glucose Meter monitoring kit Monitor BG once daily and as needed  Dx: E11.42  Provide with glucometer that insurance covers. lancets misc Monitor BG once daily and as needed  Dx: E11.42  Provide with glucometer/lancets that insurance covers. glucose blood VI test strips (BLOOD GLUCOSE TEST) strip Monitor BG once daily and as needed  Dx: E11.42  Provide with glucometer/strips that insurance covers.        Social History:  Social History     Tobacco Use    Smoking status: Every Day     Packs/day: 1.50     Types: Cigarettes    Smokeless tobacco: Never Substance Use Topics    Alcohol use: No       Family History:  Family History   Problem Relation Age of Onset    Diabetes Father     OSTEOARTHRITIS Sister     Diabetes Sister     Gall Bladder Disease Sister        Review of Systems:  Constitutional: negative fever, negative chills, negative weight loss  Eyes:   negative visual changes  ENT:   negative sore throat, tongue or lip swelling  Respiratory:  negative cough, + dyspnea  Cards:  + for chest pain, palpitations, negative lower extremity edema  GI:   See HPI  :  negative for frequency, dysuria  Integument:  negative for rash and pruritus  Heme:  + for easy bruising and gum/nose bleeding  Musculoskeletal:+ for myalgias, back pain and muscle weakness  Neuro:    negative for headaches, dizziness, vertigo  Psych: negative for feelings of anxiety, depression     Objective:   Patient Vitals for the past 8 hrs:   BP Temp Pulse Resp SpO2   04/05/23 0815 -- -- -- -- 97 %   04/05/23 0659 (!) 100/52 97.8 °F (36.6 °C) 76 22 92 %   04/05/23 0559 120/74 -- 82 19 --   04/05/23 0429 (!) 104/54 -- 73 20 99 %   04/05/23 0314 99/63 97.8 °F (36.6 °C) 76 19 90 %     No intake/output data recorded.   04/03 1901 - 04/05 0700  In: 700   Out: -     EXAM:     CONST:  Pleasant lady lying in bed, no acute distress   NEURO:  alert and oriented     HEENT: EOMI, no scleral icterus   LUNGS: No increased WOB   CARD:   Regular rate   ABD:  soft, + tenderness, no rebound, no masses, non distended   EXT:  no edema, warm   PSYCH: full, not anxious     Data Review     Recent Labs     04/05/23  0640 04/05/23  0000   WBC 13.7* 15.8*   HGB 7.3* 8.2*   HCT 24.3* 26.0*    215     Recent Labs     04/05/23  0640 04/04/23  1130   * 132*   K 3.8 3.9    102   CO2 19* 24   BUN 26* 24*   CREA 1.60* 1.76*   * 206*   CA 8.1* 9.2     Recent Labs     04/05/23  0640 04/04/23  1130   * 580*   TP 5.6* 6.6   ALB 2.1* 2.5*   GLOB 3.5 4.1*     No results for input(s): INR, PTP, APTT, INREXT in the last 72 hours. Assessment:     UGIB: hgb 7.3 status post 1 unit pRBC's, melena  Recent MI/CAD  Leukocytosis   COPD     Patient Active Problem List   Diagnosis Code    Essential hypertension I10    Atopic dermatitis L20.9    COPD (chronic obstructive pulmonary disease) with chronic bronchitis (HCC) J44.9    EVER (generalized anxiety disorder) F41.1    Tremor of face and hands R25.1    Anemia D64.9    Acute GI bleeding K92.2    Type 2 diabetes mellitus with polyneuropathy (HCC) E11.42    History of breast cancer Z85.3    Statin intolerance Z78.9    Myalgia due to statin M79.10, T46.6X5A    Mild episode of recurrent major depressive disorder (HCC) F33.0    AVM (arteriovenous malformation) of duodenum, acquired K31.819    AVM (arteriovenous malformation) of stomach, acquired K31.819    NSTEMI (non-ST elevated myocardial infarction) ([facility] [address] I21.4    Atrial fibrillation (HCC) I48.91    GIB (gastrointestinal bleeding) K92.2     Plan:       NPO midnight  BID PPI  Monitor CBC, transfuse as necessary   Consider iron infusion   Cardiology consulted for clearance/optimization for possible EGD tomorrow if cleared    Patient discussed with Dr. Nevin Camacho  Thank you for allowing me to participate in care of Balaji Teresa     Signed By: Margaret Galo NP     4/5/2023  11:01 AM       GI Attending: Agree with above plan. Tentative plan for EGD tomorrow pending Cardiology evaluation. Levy Camacho MD

## 2023-04-05 NOTE — ED NOTES
Report called to Isela flynn RN on [employer], [place] and Recent Results. All questions asked.

## 2023-04-05 NOTE — PROGRESS NOTES
Clinical Pharmacy Note: Ceftriaxone Dosing    Please note that the ceftriaxone dose for Octavio Rivera has been changed to 1000 mg IV q24h per [facility]-approved protocol. Please contact the pharmacy with any questions.     Mark Butler, PHARMD

## 2023-04-05 NOTE — ED NOTES
Bedside and Verbal shift change report given to Conda Dakins (oncoming nurse) by Rogelio Quinn (offgoing nurse). Report included the following information SBAR, Kardex, ED Summary, Intake/Output, MAR, Recent Results, Med Rec Status and Cardiac Rhythm Sinus Rhythm .

## 2023-04-06 ENCOUNTER — ANESTHESIA EVENT (OUTPATIENT)
Dept: ENDOSCOPY | Age: 68
End: 2023-04-06
Payer: MEDICARE

## 2023-04-06 ENCOUNTER — ANESTHESIA (OUTPATIENT)
Dept: ENDOSCOPY | Age: 68
End: 2023-04-06
Payer: MEDICARE

## 2023-04-06 PROCEDURE — 77030035621 HC PRB COAG APC 360DEG ERBE -C: Performed by: INTERNAL MEDICINE

## 2023-04-06 PROCEDURE — 65270000046 HC RM TELEMETRY

## 2023-04-06 PROCEDURE — 74011250636 HC RX REV CODE- 250/636: Performed by: NURSE PRACTITIONER

## 2023-04-06 PROCEDURE — C9113 INJ PANTOPRAZOLE SODIUM, VIA: HCPCS | Performed by: HOSPITALIST

## 2023-04-06 PROCEDURE — 77030036656: Performed by: INTERNAL MEDICINE

## 2023-04-06 PROCEDURE — 76040000019: Performed by: INTERNAL MEDICINE

## 2023-04-06 PROCEDURE — 94640 AIRWAY INHALATION TREATMENT: CPT

## 2023-04-06 PROCEDURE — 74011250636 HC RX REV CODE- 250/636: Performed by: HOSPITALIST

## 2023-04-06 PROCEDURE — 74011250637 HC RX REV CODE- 250/637: Performed by: INTERNAL MEDICINE

## 2023-04-06 PROCEDURE — 74011250636 HC RX REV CODE- 250/636: Performed by: INTERNAL MEDICINE

## 2023-04-06 PROCEDURE — 74011000250 HC RX REV CODE- 250: Performed by: HOSPITALIST

## 2023-04-06 PROCEDURE — 74011000250 HC RX REV CODE- 250: Performed by: INTERNAL MEDICINE

## 2023-04-06 PROCEDURE — 76060000031 HC ANESTHESIA FIRST 0.5 HR: Performed by: INTERNAL MEDICINE

## 2023-04-06 PROCEDURE — 74011250637 HC RX REV CODE- 250/637: Performed by: HOSPITALIST

## 2023-04-06 PROCEDURE — 2709999900 HC NON-CHARGEABLE SUPPLY: Performed by: INTERNAL MEDICINE

## 2023-04-06 RX ADMIN — ROSUVASTATIN 5 MG: 10 TABLET, FILM COATED ORAL at 22:23

## 2023-04-06 RX ADMIN — Medication 120 MCG: at 15:32

## 2023-04-06 RX ADMIN — IPRATROPIUM BROMIDE AND ALBUTEROL SULFATE 3 ML: .5; 3 SOLUTION RESPIRATORY (INHALATION) at 04:37

## 2023-04-06 RX ADMIN — GABAPENTIN 400 MG: 300 CAPSULE ORAL at 16:47

## 2023-04-06 RX ADMIN — BUDESONIDE 500 MCG: 0.5 INHALANT RESPIRATORY (INHALATION) at 07:52

## 2023-04-06 RX ADMIN — SODIUM CHLORIDE 40 MG: 9 INJECTION INTRAMUSCULAR; INTRAVENOUS; SUBCUTANEOUS at 16:49

## 2023-04-06 RX ADMIN — PROPOFOL 50 MG: 10 INJECTION, EMULSION INTRAVENOUS at 15:26

## 2023-04-06 RX ADMIN — PROPOFOL 50 MG: 10 INJECTION, EMULSION INTRAVENOUS at 15:21

## 2023-04-06 RX ADMIN — BISACODYL 10 MG: 5 TABLET, COATED ORAL at 11:01

## 2023-04-06 RX ADMIN — SODIUM CHLORIDE 40 MG: 9 INJECTION INTRAMUSCULAR; INTRAVENOUS; SUBCUTANEOUS at 06:25

## 2023-04-06 RX ADMIN — ARFORMOTEROL TARTRATE 15 MCG: 15 SOLUTION RESPIRATORY (INHALATION) at 07:52

## 2023-04-06 RX ADMIN — PROPOFOL 30 MG: 10 INJECTION, EMULSION INTRAVENOUS at 15:29

## 2023-04-06 RX ADMIN — EZETIMIBE 10 MG: 10 TABLET ORAL at 10:24

## 2023-04-06 RX ADMIN — PROPOFOL 30 MG: 10 INJECTION, EMULSION INTRAVENOUS at 15:32

## 2023-04-06 RX ADMIN — POLYETHYLENE GLYCOL 3350 17 G: 17 POWDER, FOR SOLUTION ORAL at 16:56

## 2023-04-06 RX ADMIN — SODIUM CHLORIDE, PRESERVATIVE FREE 10 ML: 5 INJECTION INTRAVENOUS at 06:25

## 2023-04-06 RX ADMIN — SODIUM CHLORIDE, POTASSIUM CHLORIDE, SODIUM LACTATE AND CALCIUM CHLORIDE: 600; 310; 30; 20 INJECTION, SOLUTION INTRAVENOUS at 15:17

## 2023-04-06 RX ADMIN — BISACODYL 10 MG: 10 SUPPOSITORY RECTAL at 22:23

## 2023-04-06 RX ADMIN — PROPOFOL 30 MG: 10 INJECTION, EMULSION INTRAVENOUS at 15:35

## 2023-04-06 RX ADMIN — GABAPENTIN 800 MG: 400 CAPSULE ORAL at 10:23

## 2023-04-06 RX ADMIN — SODIUM CHLORIDE 50 ML/HR: 9 INJECTION, SOLUTION INTRAMUSCULAR; INTRAVENOUS; SUBCUTANEOUS at 16:47

## 2023-04-06 RX ADMIN — PROPOFOL 50 MG: 10 INJECTION, EMULSION INTRAVENOUS at 15:23

## 2023-04-06 RX ADMIN — GABAPENTIN 400 MG: 300 CAPSULE ORAL at 22:23

## 2023-04-06 NOTE — CARDIO/PULMONARY
Cardiac Rehab: Per 4/5/23 note from Dr. Rosette Weber, pt had a Type II MI at [facility] on 3/2023. [facility] contact information placed on the AVS with instructions for pt to call to discuss enrollment.  Radha Hadley RN

## 2023-04-06 NOTE — PROGRESS NOTES
Problem: Falls - Risk of  Goal: *Absence of Falls  Description: Document Josie Salines Fall Risk and appropriate interventions in the flowsheet.   Outcome: Progressing Towards Goal  Note: Fall Risk Interventions:                                Problem: Patient Education: Go to Patient Education Activity  Goal: Patient/Family Education  Outcome: Progressing Towards Goal     Problem: General Medical Care Plan  Goal: *Vital signs within specified parameters  Outcome: Progressing Towards Goal  Goal: *Labs within defined limits  Outcome: Progressing Towards Goal  Goal: *Absence of infection signs and symptoms  Outcome: Progressing Towards Goal  Goal: *Optimal pain control at patient's stated goal  Outcome: Progressing Towards Goal  Goal: *Skin integrity maintained  Outcome: Progressing Towards Goal  Goal: *Fluid volume balance  Outcome: Progressing Towards Goal  Goal: *Optimize nutritional status  Outcome: Progressing Towards Goal  Goal: *Anxiety reduced or absent  Outcome: Progressing Towards Goal  Goal: *Progressive mobility and function (eg: ADL's)  Outcome: Progressing Towards Goal     Problem: Patient Education: Go to Patient Education Activity  Goal: Patient/Family Education  Outcome: Progressing Towards Goal

## 2023-04-06 NOTE — PROGRESS NOTES
1600: Received shift change report. 1745: Updated daughter via telephone re: plan of care. 1850: Serial h&h order would not print from [facility]; order had to be reordered in order to produce label. Pt tolerated stick well. Requested there to be no chicken in her diet orders; diet order updated.

## 2023-04-06 NOTE — ANESTHESIA POSTPROCEDURE EVALUATION
Procedure(s):  ESOPHAGOGASTRODUODENOSCOPY (EGD)  ENDOSCOPIC ARGON PLASMA COAGULATION. MAC    Anesthesia Post Evaluation      Multimodal analgesia: multimodal analgesia used between 6 hours prior to anesthesia start to PACU discharge  Patient location during evaluation: PACU  Level of consciousness: awake  Pain management: adequate  Airway patency: patent  Anesthetic complications: no  Cardiovascular status: acceptable  Respiratory status: acceptable  Hydration status: acceptable  Post anesthesia nausea and vomiting:  none  Final Post Anesthesia Temperature Assessment:  Normothermia (36.0-37.5 degrees C)      INITIAL Post-op Vital signs:   Vitals Value Taken Time   /58 04/06/23 1700   Temp 36.6 °C (97.8 °F) 04/06/23 1647   Pulse 82 04/06/23 1733   Resp 21 04/06/23 1733   SpO2 98 % 04/06/23 1733   Vitals shown include unvalidated device data.

## 2023-04-06 NOTE — PROGRESS NOTES
Initial RN admission and assessment performed and documented in Endoscopy navigator. Patient evaluated by anesthesia in pre-procedure holding. All procedural vital signs, airway assessment, and level of consciousness information monitored and recorded by anesthesia staff on the anesthesia record. Report received from CRNA post procedure. Patient transported to recovery area by RN. Endoscope was pre-cleaned at bedside immediately following procedure by Sasha Guajardo  .    TRANSFER - OUT REPORT:    Verbal report given to Luly Lundy RN (name) on [facility]  being transferred to [address] [place] [address] (unit) for routine post - op       Report consisted of patients Situation, Background, Assessment and   Recommendations(SBAR). Information from the following report(s) SBAR, Kardex, and Procedure Summary was reviewed with the receiving nurse. Lines:   Peripheral IV 04/04/23 Right Antecubital (Active)   Site Assessment Clean, dry, & intact 04/05/23 2000   Phlebitis Assessment 0 04/05/23 2000   Infiltration Assessment 0 04/05/23 2000   Dressing Status Clean, dry, & intact 04/05/23 2000   Dressing Type Transparent;Tape 04/05/23 2000   Hub Color/Line Status Pink;Capped;Flushed 04/05/23 2000   Action Taken Open ports on tubing capped 04/05/23 2000   Alcohol Cap Used Yes 04/05/23 2000       Peripheral IV 04/06/23 Right Wrist (Active)        Opportunity for questions and clarification was provided.       Patient transported with:   Porphyrio

## 2023-04-06 NOTE — PROGRESS NOTES
REZA: anticipate d/c home independently; Follow up with PCP & Specialist    Pt uses [employer] 02 at home PRN, does not recall name of 02 supplier    Daughter transport    RUR: 17%  -Cardiology & GI following  Reason for Admission:     GIB, history of AVM  History of: T2DM, CAD, COPD, EVER                  RUR Score:     17%             PCP: First and Last name:   Lexis Reddy NP     Name of Practice:    Are you a current patient: Yes/No: YES   Approximate date of last visit: 3 months ago   Can you participate in a virtual visit if needed:   YES  Do you (patient/family) have any concerns for transition/discharge? PT denies concerns related to d/c  Plan for utilizing home health:     No history of HH  Current Advanced Directive/Advance Care Plan:  Full Code      Healthcare Decision Maker:   Click here to complete [address] including selection of the Healthcare Decision Maker Relationship (ie \"Primary\")          DaughterVincent, [phone]    Transition of Care Plan:        1020-CM reviewed pt chart & met with pt at bedside to discuss transitional planning. Pt resides alone in a one story home and stated she is independent with adls/iadls. Pt's dtr and son lives in [place] [address] and dtr lives in the [facility], both are supportive, especially the dtr. DME is: 02 at home, Rollator, and nebulizer. Pt denies prior HH/IPR/SNF. Pt uses [facility] on [address] for meds with no copay concerns. CM confirmed pcp/demographics/insurance. Dtr can provide d/c transport. CM to follow. Saniya Welch RN BSN CCM  Care Management Interventions  PCP Verified by CM:  Yes  Palliative Care Criteria Met (RRAT>21 & CHF Dx)?: No  Transition of Care Consult (CM Consult): Discharge Planning  MyChart Signup: Yes  Discharge Durable Medical Equipment: No  Health Maintenance Reviewed: Yes  Physical Therapy Consult: No  Occupational Therapy Consult: No  Support Systems: Child(louann)  Confirm Follow Up Transport: Family  Discharge Location  Patient Expects to be Discharged to[de-identified] Home

## 2023-04-06 NOTE — PROGRESS NOTES
Hospitalist Progress Note  Yasmin Galdamez MD  Answering service: [phone] from in house phone        Date of Service:  2023  NAME:  Herb Deluna  :  1955  MRN:  [id number]      Admission Summary:     Patient is a 29-year-old female who is a poor historian present emergency department for weakness, fatigue, abdominal pain and black stools. Patient's son who is providing HPI states that she was recently discharged from [facility] after she had had multiple endoscopies requiring cauterization for GI bleed. Initially the first time patient was at [facility] she had multiple areas in her stomach requiring embolization subsequently patient returned to [facility] had additional areas requiring embolization as well as the small intestine. Patient's son states that while she was at [facility] she had an MI and required to have a heart catheterization but was not able to get stents. Patient was supposed to have another endoscopy but she was not medically cleared to have this because of the MI. Patient was discharged last Tuesday , pt is taking baby aspirin on discharge. Son states that she is continuing to have black tarry stools now with increased weakness, fatigue shortness of breath and chest discomfort.  + back pain  Denied abd pain      Interval history / Subjective:   Pt with no further episodes of bleeding today.    Ongoing abdominal pain  Planned for EGD today      Assessment & Plan:     Acute recurrent GI blood loss anemai with hx of AVM  -Continue on IV protonix   -monitor H/H and transfuse for Hgb <7.0  -GI is consulted and plan for EGD today     Hx of CAD  -No left-sided chest pain  -continue crestor  -metoprolol on hold due to soft BP  -not on antiplatelet     M7DH  -Last hemoglobin A1c was 5.6 on 3/17  -continue sliding scale and monitor finger stick glucose     COPD  -Not bronchospastic, saturation from 94 to 98% on room air  -Continue on pulmicort, brovana, duo neb, monitor pulse ox and as needed oxygen support    Mild hyponatremia resolved   UTI ruled out - DC CTX, culture negative      Hx of EVER  -stable  -continue home Wellbutrin        Code status: Full Code  Prophylaxis: SCD  Care Plan discussed with: Patient  Anticipated Disposition: Home with family 24-48 hrs      Hospital Problems  Date Reviewed: 3/20/2023            Codes Class Noted POA    GIB (gastrointestinal bleeding) ICD-10-CM: K92.2  ICD-9-CM: 578.9  4/4/2023 Unknown               Vital Signs:    Last 24hrs VS reviewed since prior progress note. Most recent are:  Visit Vitals  BP (!) 107/55 (BP 1 Location: Right upper arm, BP Patient Position: At rest)   Pulse 83   Temp 97.8 °F (36.6 °C)   Resp 21   Ht 5' 3\" (1.6 m)   Wt 66.2 kg (146 lb)   SpO2 98%   BMI 25.86 kg/m²       No intake or output data in the 24 hours ending 04/06/23 1721       Physical Examination:     I had a face to face encounter with this patient and independently examined them on 4/6/2023 as outlined below:          General : alert x 3, awake, no acute distress,   HEENT: PEERL, EOMI, moist mucus membrane,   Chest: Clear to auscultation bilaterally   CVS: S1 S2 heard, Capillary refill less than 2 seconds  Abd: soft/ + mild tenderness, non distended, BS physiological,   Ext: no clubbing, no cyanosis, no edema, brisk 2+ DP pulses  Neuro/Psych: pleasant mood and affect, CN 2-12 grossly intact,   Skin: warm            Data Review:    Review and/or order of clinical lab test  Review and/or order of tests in the radiology section of CPT  Review and/or order of tests in the medicine section of CPT    I have independently reviewed and interpreted patient's lab and all other diagnostic data    Notes reviewed from all clinical/nonclinical/nursing services involved in patient's clinical care.  Care coordination discussions were held with appropriate clinical/nonclinical/ nursing providers based on care coordination needs. Labs:     Recent Labs     04/06/23  0431 04/05/23  0640   WBC 13.3* 13.7*   HGB 8.0* 7.3*   HCT 25.8* 24.3*    188       Recent Labs     04/06/23  0431 04/05/23  0640 04/04/23  1130   * 134* 132*   K 4.0 3.8 3.9    107 102   CO2 22 19* 24   BUN 27* 26* 24*   CREA 1.69* 1.60* 1.76*   * 111* 206*   CA 9.2 8.1* 9.2       Recent Labs     04/06/23  0431 04/05/23  0640 04/04/23  1130   ALT 26 10* 11*   * 436* 580*   TBILI 0.6 1.0 0.8   TP 6.5 5.6* 6.6   ALB 2.3* 2.1* 2.5*   GLOB 4.2* 3.5 4.1*       No results for input(s): INR, PTP, APTT, INREXT, INREXT in the last 72 hours. No results for input(s): FE, TIBC, PSAT, FERR in the last 72 hours. Lab Results   Component Value Date/Time    Folate 7.5 07/07/2021 11:21 AM        No results for input(s): PH, PCO2, PO2 in the last 72 hours. No results for input(s): CPK, CKNDX, TROIQ in the last 72 hours.     No lab exists for component: CPKMB  Lab Results   Component Value Date/Time    Cholesterol, total 114 03/17/2023 01:30 PM    HDL Cholesterol 51 03/17/2023 01:30 PM    LDL, calculated 40.6 03/17/2023 01:30 PM    Triglyceride 112 03/17/2023 01:30 PM    CHOL/HDL Ratio 2.2 03/17/2023 01:30 PM     Lab Results   Component Value Date/Time    Glucose (POC) 107 04/06/2023 01:01 PM    Glucose (POC) 121 (H) 04/06/2023 08:21 AM    Glucose (POC) 127 (H) 04/06/2023 06:59 AM    Glucose (POC) 107 04/05/2023 11:02 PM    Glucose (POC) 84 04/05/2023 04:47 PM     Lab Results   Component Value Date/Time    Color YELLOW 02/07/2023 11:04 AM    Appearance CLOUDY (A) 02/07/2023 11:04 AM    Specific gravity 1.009 02/07/2023 11:04 AM    pH (UA) 5.5 02/07/2023 11:04 AM    Protein Negative 02/07/2023 11:04 AM    Glucose Negative 02/07/2023 11:04 AM    Ketone Negative 02/07/2023 11:04 AM    Bilirubin Negative 02/07/2023 11:04 AM    Urobilinogen 0.2 02/07/2023 11:04 AM    Nitrites Negative 02/07/2023 11:04 AM    Leukocyte Esterase LARGE (A) 02/07/2023 11:04 AM    Epithelial cells MODERATE (A) 02/07/2023 11:04 AM    Bacteria Negative 02/07/2023 11:04 AM    WBC  02/07/2023 11:04 AM    RBC 0-5 02/07/2023 11:04 AM         Medications Reviewed:     Current Facility-Administered Medications   Medication Dose Route Frequency    0.9% sodium chloride infusion  50 mL/hr IntraVENous CONTINUOUS    sodium chloride (NS) flush 5-40 mL  5-40 mL IntraVENous Q8H    sodium chloride (NS) flush 5-40 mL  5-40 mL IntraVENous PRN    bisacodyL (DULCOLAX) suppository 10 mg  10 mg Rectal DAILY PRN    bisacodyL (DULCOLAX) tablet 10 mg  10 mg Oral DAILY PRN    [START ON 4/7/2023] amiodarone (CORDARONE) tablet 200 mg  200 mg Oral DAILY    gabapentin (NEURONTIN) capsule 400 mg  400 mg Oral TID    albuterol (PROVENTIL VENTOLIN) nebulizer solution 5 mg  5 mg Nebulization ONCE    0.9% sodium chloride infusion 250 mL  250 mL IntraVENous PRN    pantoprazole (PROTONIX) 40 mg in 0.9% sodium chloride 10 mL injection  40 mg IntraVENous Q12H    buPROPion SR (WELLBUTRIN SR) tablet 150 mg  150 mg Oral BID    ezetimibe (ZETIA) tablet 10 mg  10 mg Oral DAILY    rosuvastatin (CRESTOR) tablet 5 mg  5 mg Oral QHS    lidocaine 4 % patch 1 Patch  1 Patch TransDERmal Q24H    glucose chewable tablet 16 g  4 Tablet Oral PRN    glucagon (GLUCAGEN) injection 1 mg  1 mg IntraMUSCular PRN    dextrose 10 % infusion 0-250 mL  0-250 mL IntraVENous PRN    insulin lispro (HUMALOG) injection   SubCUTAneous AC&HS    sodium chloride (NS) flush 5-40 mL  5-40 mL IntraVENous Q8H    sodium chloride (NS) flush 5-40 mL  5-40 mL IntraVENous PRN    acetaminophen (TYLENOL) tablet 650 mg  650 mg Oral Q6H PRN    Or    acetaminophen (TYLENOL) suppository 650 mg  650 mg Rectal Q6H PRN    polyethylene glycol (MIRALAX) packet 17 g  17 g Oral DAILY PRN    ondansetron (ZOFRAN ODT) tablet 4 mg  4 mg Oral Q8H PRN    Or    ondansetron (ZOFRAN) injection 4 mg  4 mg IntraVENous Q6H PRN    albuterol-ipratropium (DUO-NEB) 2.5 MG-0.5 MG/3 ML  3 mL Nebulization Q6H PRN    arformoteroL (BROVANA) neb solution 15 mcg  15 mcg Nebulization BID RT    And    budesonide (PULMICORT) 500 mcg/2 ml nebulizer suspension  500 mcg Nebulization BID RT     ______________________________________________________________________  EXPECTED LENGTH OF STAY: 3d 0h  ACTUAL LENGTH OF STAY:          2                 Richard Jackson MD

## 2023-04-06 NOTE — ANESTHESIA PREPROCEDURE EVALUATION
Relevant Problems   HEMATOLOGY   (+) Anemia       Anesthetic History   No history of anesthetic complications            Review of Systems / Medical History  Patient summary reviewed, nursing notes reviewed and pertinent labs reviewed    Pulmonary    COPD: moderate      Smoker  Asthma        Neuro/Psych   Within defined limits           Cardiovascular    Hypertension        Dysrhythmias : atrial fibrillation  CAD    Exercise tolerance: >4 METS  Comments: Type II NSTEMI 3/2023 2/2 demand ischemia from anemia. LHC with  to RCA  and LAD with collaterals-medical management.  Cardiology clearance (acceptable risk)    Echo 3/28/23 showed EF 55% without valvular disease of significance     GI/Hepatic/Renal         Renal disease: CRI and ARF       Endo/Other    Diabetes    Anemia     Other Findings   Comments: Hb 8.0   HS troponin 97, 98, 80           Physical Exam    Airway  Mallampati: I           Cardiovascular    Rhythm: regular           Dental    Dentition: Edentulous     Pulmonary  Breath sounds clear to auscultation               Abdominal         Other Findings            Anesthetic Plan    ASA: 4  Anesthesia type: MAC          Induction: Intravenous  Anesthetic plan and risks discussed with: Patient

## 2023-04-06 NOTE — ROUTINE PROCESS
TRANSFER - IN REPORT:    Verbal report received from Lashell(name) on Sue Found  being received from [facility](unit) for ordered procedure      Report consisted of patients Situation, Background, Assessment and   Recommendations(SBAR). Information from the following report(s) SBAR was reviewed with the receiving nurse. Opportunity for questions and clarification was provided. Assessment completed upon patients arrival to unit and care assumed.

## 2023-04-06 NOTE — PROCEDURES
[facility]  [address] [facility], [address] (EGD) Procedure Note    Rufus Babcock  1955  [id number]      Procedure: Endoscopic Gastroduodenoscopy --diagnostic, with control of bleeding    Indication: melena, anemia, history of AVM's    Pre-operative Diagnosis: see indication above    Post-operative Diagnosis: see findings below    : Bereket Carvalho. Wero Ibrahim MD    Staff: Endoscopy Technician-1: Lana Delgado  Endoscopy RN-1: Saritha Hampton RN     Referring Provider:  Nini Nugent NP      Anesthesia/Sedation:  MAC anesthesia Propofol        Procedure Details     After informed consent was obtained for the procedure, with all risks and benefits of procedure explained the patient was taken to the endoscopy suite and placed in the left lateral decubitus position. Following sequential administration of sedation as per above, the endoscope was inserted into the mouth and advanced under direct vision to third portion of the duodenum. A careful inspection was made as the gastroscope was withdrawn, including a retroflexed view of the proximal stomach; findings and interventions are described below. Findings:   Esophagus:normal  Stomach: two non-bleeding 2 mm angiodysplasia - ablated with APC, patchy antral erythema  Duodenum: two non-bleeding 2 mm angiodysplasia - ablated with APC      Therapies:  as above    Specimens: none         EBL: None      Complications:   None; patient tolerated the procedure well. Impression:    As above    Recommendations:  Monitor CBC  Continue PPI - would continue for at least two months  Avoid non-essential NSAID's  Cardiac diet  Will follow    Signed By: Bereket Carvalho.  Wero Ibrahim MD     4/6/2023  3:42 PM

## 2023-04-07 PROBLEM — Q27.30 AVM (ARTERIOVENOUS MALFORMATION): Status: ACTIVE | Noted: 2023-03-02

## 2023-04-07 RX ADMIN — Medication 10 ML: at 05:45

## 2023-04-07 RX ADMIN — IPRATROPIUM BROMIDE AND ALBUTEROL SULFATE 3 ML: .5; 3 SOLUTION RESPIRATORY (INHALATION) at 13:43

## 2023-04-07 RX ADMIN — BUDESONIDE 500 MCG: 0.5 INHALANT RESPIRATORY (INHALATION) at 07:50

## 2023-04-07 RX ADMIN — SODIUM CHLORIDE 40 MG: 9 INJECTION INTRAMUSCULAR; INTRAVENOUS; SUBCUTANEOUS at 06:06

## 2023-04-07 RX ADMIN — EZETIMIBE 10 MG: 10 TABLET ORAL at 09:36

## 2023-04-07 RX ADMIN — ARFORMOTEROL TARTRATE 15 MCG: 15 SOLUTION RESPIRATORY (INHALATION) at 07:50

## 2023-04-07 RX ADMIN — IPRATROPIUM BROMIDE AND ALBUTEROL SULFATE 3 ML: .5; 3 SOLUTION RESPIRATORY (INHALATION) at 05:46

## 2023-04-07 RX ADMIN — SODIUM CHLORIDE, PRESERVATIVE FREE 10 ML: 5 INJECTION INTRAVENOUS at 06:06

## 2023-04-07 RX ADMIN — GABAPENTIN 400 MG: 300 CAPSULE ORAL at 09:36

## 2023-04-11 PROBLEM — K92.2 GIB (GASTROINTESTINAL BLEEDING): Status: RESOLVED | Noted: 2023-04-04 | Resolved: 2023-04-11

## 2023-04-11 PROBLEM — K92.2 ACUTE GI BLEEDING: Status: RESOLVED | Noted: 2021-07-09 | Resolved: 2023-04-11

## 2023-04-11 PROBLEM — K92.2 GASTROINTESTINAL HEMORRHAGE: Status: ACTIVE | Noted: 2023-04-11

## 2023-04-12 ENCOUNTER — HOME HEALTH ADMISSION (OUTPATIENT)
Dept: HOME HEALTH SERVICES | Facility: HOME HEALTH | Age: 68
End: 2023-04-12

## 2023-04-20 ENCOUNTER — PATIENT OUTREACH (OUTPATIENT)
Dept: CASE MANAGEMENT | Age: 68
End: 2023-04-20

## 2023-04-20 NOTE — PROGRESS NOTES
Care Transitions Outreach     Call within 2 business days of discharge: Yes   Met with patient briefly by phone for transitions of care follow-up. Patient states, \"I cannot talk right now, call me back after next Monday. \" Care Transitions Nurse (CTN) will attempt to reach patient again and CTN will continue to monitor. Patient: Nedra Peace Patient : 1955 MRN: [id number]    Last Discharge  Street       Date Complaint Diagnosis Description Type Department Provider    23 Rectal Bleeding Gastrointestinal hemorrhage with melena . .. ED to Hosp-Admission (Discharged) (ADMIT) Lucrecia Singh MD; And... Was this an external facility discharge? No     Noted following upcoming appointments from discharge chart review:   [facility] follow up appointment(s):   Future Appointments   Date Time Provider Mic Ordonez   2023  2:00 PM Cheri Mcgregor NP [facility] BS [facility]     Non-BS follow up appointment(s): None noted at this time.

## 2023-04-21 DIAGNOSIS — I10 ESSENTIAL HYPERTENSION: ICD-10-CM

## 2023-04-21 DIAGNOSIS — J44.9 COPD (CHRONIC OBSTRUCTIVE PULMONARY DISEASE) WITH CHRONIC BRONCHITIS (HCC): ICD-10-CM

## 2023-04-21 RX ORDER — LORAZEPAM 1 MG/1
1 TABLET ORAL
Qty: 30 TABLET | Refills: 0 | Status: CANCELLED | OUTPATIENT
Start: 2023-04-21

## 2023-04-22 DIAGNOSIS — Z12.31 ENCOUNTER FOR SCREENING MAMMOGRAM FOR MALIGNANT NEOPLASM OF BREAST: Primary | ICD-10-CM

## 2023-04-22 DIAGNOSIS — Z78.0 POSTMENOPAUSAL STATE: Primary | ICD-10-CM

## 2023-04-28 ENCOUNTER — PATIENT OUTREACH (OUTPATIENT)
Dept: CASE MANAGEMENT | Age: 68
End: 2023-04-28

## 2023-04-28 NOTE — PROGRESS NOTES
Care Transitions Outreach     Per chart review, patient was admitted to [facility] on 23 and remains inpatient at this time. Care Transitions Nurse (CTN) met briefly with patient's daughter, Parveen Luo (on [address] dated 3-17-23), who states patient is being evaluated for \"Several clots. \"  CTN will continue to monitor. Patient: Page Barger Patient : 1955 MRN: [id number]    Last Discharge [address]       Date Complaint Diagnosis Description Type Department Provider    23 Rectal Bleeding Gastrointestinal hemorrhage with melena . .. ED to Hosp-Admission (Discharged) (ADMIT) Lizbeth Snellen, MD; And... Noted following upcoming appointments from discharge chart review:   [facility] follow up appointment(s):   Future Appointments   Date Time Provider Mic Ordonez   2023  2:00 PM Kofi Mcgregor NP [facility] BS [facility]     Non-[facility] follow up appointment(s): None noted at this time. Goals        Understands red flags post discharge. 4-10-23: Red flags of GI bleed reviewed with patient, and patient verbalized an understanding. Patient denies chest pain, denies shortness of breath, denies fever/chills, and denies nausea/vomiting since discharge on 23. Patient states she has not had a bowel movement since discharge, states last bowel movement was on 23 prior to discharge, states she did not see any visible blood in her stool, and states stool was not black/dark in color. States she is utilizing a regular diet at home, appetite is fair. Denies having any falls in the last 12 months. Reports an increase in anxiety stating, \"I'm afraid I'm going to have another heart attack. \"  Reports bilateral leg pain and attributes this pain to cholesterol medications. Reports she continues to use home oxygen at 2lpm as needed, provided by [employer]. States she quit smoking cigarettes in 2023. Patient has no red flags to report at this time.  Care Transitions Nurse will review red flags again on next phone conversation with patient. Gissell Hadley    4-20-23: Met with patient briefly by phone for transitions of care follow-up. Patient states, \"I cannot talk right now, call me back after next Monday. \" Care Transitions Nurse (CTN) will attempt to reach patient again and CTN will continue to monitor. NATALIO     4-28-23: Per chart review, patient was readmitted to [facility] on 4-22-23 and remains inpatient at this time. Patient's daughter, Janessa Monte (on PHI dated 3-17-23), states patient is being evaluated for \"Several clots. \"  CTN will continue to follow.  Gissell Hadley.

## 2023-05-01 DIAGNOSIS — J44.9 COPD (CHRONIC OBSTRUCTIVE PULMONARY DISEASE) WITH CHRONIC BRONCHITIS (HCC): ICD-10-CM

## 2023-05-01 DIAGNOSIS — I10 ESSENTIAL HYPERTENSION: ICD-10-CM

## 2023-05-01 RX ORDER — LORAZEPAM 1 MG/1
1 TABLET ORAL
Qty: 30 TABLET | Refills: 0 | Status: SHIPPED | OUTPATIENT
Start: 2023-05-01

## 2023-05-01 NOTE — TELEPHONE ENCOUNTER
Last Visit: 4/11/23 with NP Balbir  Next Appointment: 6/7/23 with NP Balbir  Previous Refill Encounter(s): 2/7/23 #30 with 1 refill    Requested Prescriptions     Pending Prescriptions Disp Refills    LORazepam (ATIVAN) 1 mg tablet 30 Tablet 0     Sig: Take 1 Tablet by mouth nightly as needed for Anxiety. Max Daily Amount: 1 mg. For Pharmacy Admin Tracking Only    Program: Medication Refill  CPA in place:   Recommendation Provided To:    Intervention Detail: New Rx: 1, reason: Patient Preference  Intervention Accepted By:   Fabricio Proctor Closed?:   Time Spent (min): 5

## 2023-05-02 RX ORDER — NITROGLYCERIN 0.4 MG/1
TABLET SUBLINGUAL
COMMUNITY
Start: 2023-03-27

## 2023-05-02 RX ORDER — ASPIRIN 81 MG/1
TABLET ORAL
COMMUNITY
Start: 2023-04-05

## 2023-05-02 RX ORDER — POLYETHYLENE GLYCOL 3350 17 G/17G
17 POWDER, FOR SOLUTION ORAL DAILY
COMMUNITY
Start: 2023-04-08 | End: 2023-05-08

## 2023-05-02 RX ORDER — FERROUS SULFATE 324(65)MG
324 TABLET, DELAYED RELEASE (ENTERIC COATED) ORAL
COMMUNITY
Start: 2023-04-07 | End: 2023-05-07

## 2023-05-02 RX ORDER — AMIODARONE HYDROCHLORIDE 200 MG/1
TABLET ORAL
COMMUNITY
Start: 2023-03-27

## 2023-06-16 NOTE — TELEPHONE ENCOUNTER
Last appointment: 4/11/23  Next appointment: no show 6/7/23  Previous refill encounter(s): 6/8/22 #3 with 3 refills    Requested Prescriptions     Pending Prescriptions Disp Refills    budesonide-formoterol (SYMBICORT) 160-4.5 MCG/ACT AERO 30.6 g 3     Sig: Inhale 2 puffs into the lungs 2 times daily         For Pharmacy Admin Tracking Only    Program: Medication Refill  CPA in place:    Recommendation Provided To:    Intervention Detail: New Rx: 1, reason: Patient Preference  Intervention Accepted By:   Corby Becerril Closed?:    Time Spent (min): 5

## 2023-06-18 RX ORDER — BUDESONIDE AND FORMOTEROL FUMARATE DIHYDRATE 160; 4.5 UG/1; UG/1
2 AEROSOL RESPIRATORY (INHALATION) 2 TIMES DAILY
Qty: 30.6 G | Refills: 3 | OUTPATIENT
Start: 2023-06-18

## 2024-02-09 ENCOUNTER — ENROLLMENT (OUTPATIENT)
Dept: PHARMACY | Facility: CLINIC | Age: 69
End: 2024-02-09

## (undated) LAB
ALBUMIN SERPL-MCNC: 2.3 G/DL (ref 3.5–5)
ALBUMIN/GLOB SERPL: 0.5 (ref 1.1–2.2)
ALP SERPL-CCNC: 468 U/L (ref 45–117)
ALT SERPL-CCNC: 26 U/L (ref 12–78)
ANION GAP SERPL CALC-SCNC: 8 MMOL/L (ref 5–15)
AST SERPL-CCNC: 54 U/L (ref 15–37)
BASOPHILS # BLD: 0 K/UL (ref 0–0.1)
BASOPHILS # BLD: 0 K/UL (ref 0–0.1)
BASOPHILS NFR BLD: 0 % (ref 0–1)
BASOPHILS NFR BLD: 0 % (ref 0–1)
BILIRUB SERPL-MCNC: 0.6 MG/DL (ref 0.2–1)
BUN SERPL-MCNC: 27 MG/DL (ref 6–20)
BUN/CREAT SERPL: 16 (ref 12–20)
CALCIUM SERPL-MCNC: 9.2 MG/DL (ref 8.5–10.1)
CHLORIDE SERPL-SCNC: 102 MMOL/L (ref 97–108)
CO2 SERPL-SCNC: 22 MMOL/L (ref 21–32)
CREAT SERPL-MCNC: 1.69 MG/DL (ref 0.55–1.02)
DIFFERENTIAL METHOD BLD: (no result)
DIFFERENTIAL METHOD BLD: ABNORMAL
EOSINOPHIL # BLD: 0.1 K/UL (ref 0–0.4)
EOSINOPHIL # BLD: 0.1 K/UL (ref 0–0.4)
EOSINOPHIL NFR BLD: 1 % (ref 0–7)
EOSINOPHIL NFR BLD: 1 % (ref 0–7)
ERYTHROCYTE [DISTWIDTH] IN BLOOD BY AUTOMATED COUNT: 17.2 % (ref 11.5–14.5)
ERYTHROCYTE [DISTWIDTH] IN BLOOD BY AUTOMATED COUNT: 17.3 % (ref 11.5–14.5)
GLOBULIN SER CALC-MCNC: 4.2 G/DL (ref 2–4)
GLUCOSE BLD STRIP.AUTO-MCNC: 107 MG/DL (ref 65–117)
GLUCOSE BLD STRIP.AUTO-MCNC: 114 MG/DL (ref 65–117)
GLUCOSE BLD STRIP.AUTO-MCNC: 121 MG/DL (ref 65–117)
GLUCOSE BLD STRIP.AUTO-MCNC: 127 MG/DL (ref 65–117)
GLUCOSE BLD STRIP.AUTO-MCNC: 169 MG/DL (ref 65–117)
GLUCOSE SERPL-MCNC: 109 MG/DL (ref 65–100)
HCT VFR BLD AUTO: 24.2 % (ref 35–47)
HCT VFR BLD AUTO: 25.8 % (ref 35–47)
HGB BLD-MCNC: 7.2 G/DL (ref 11.5–16)
HGB BLD-MCNC: 8 G/DL (ref 11.5–16)
IMM GRANULOCYTES # BLD AUTO: 0.1 K/UL (ref 0–0.04)
IMM GRANULOCYTES # BLD AUTO: 0.1 K/UL (ref 0–0.04)
IMM GRANULOCYTES NFR BLD AUTO: 1 % (ref 0–0.5)
IMM GRANULOCYTES NFR BLD AUTO: 1 % (ref 0–0.5)
LYMPHOCYTES # BLD: 0.5 K/UL (ref 0.8–3.5)
LYMPHOCYTES # BLD: 0.8 K/UL (ref 0.8–3.5)
LYMPHOCYTES NFR BLD: 4 % (ref 12–49)
LYMPHOCYTES NFR BLD: 6 % (ref 12–49)
MCH RBC QN AUTO: 28.1 PG (ref 26–34)
MCH RBC QN AUTO: 28.8 PG (ref 26–34)
MCHC RBC AUTO-ENTMCNC: 29.8 G/DL (ref 30–36.5)
MCHC RBC AUTO-ENTMCNC: 31 G/DL (ref 30–36.5)
MCV RBC AUTO: 92.8 FL (ref 80–99)
MCV RBC AUTO: 94.5 FL (ref 80–99)
MONOCYTES # BLD: 0.8 K/UL (ref 0–1)
MONOCYTES # BLD: 1.1 K/UL (ref 0–1)
MONOCYTES NFR BLD: 7 % (ref 5–13)
MONOCYTES NFR BLD: 8 % (ref 5–13)
NEUTS SEG # BLD: 11.2 K/UL (ref 1.8–8)
NEUTS SEG # BLD: 9.9 K/UL (ref 1.8–8)
NEUTS SEG NFR BLD: 84 % (ref 32–75)
NEUTS SEG NFR BLD: 87 % (ref 32–75)
NRBC # BLD: 0 K/UL (ref 0–0.01)
NRBC # BLD: 0 K/UL (ref 0–0.01)
NRBC BLD-RTO: 0 PER 100 WBC
NRBC BLD-RTO: 0 PER 100 WBC
PLATELET # BLD AUTO: 204 K/UL (ref 150–400)
PLATELET # BLD AUTO: 239 K/UL (ref 150–400)
PMV BLD AUTO: 11 FL (ref 8.9–12.9)
PMV BLD AUTO: 11.1 FL (ref 8.9–12.9)
POTASSIUM SERPL-SCNC: 4 MMOL/L (ref 3.5–5.1)
PROT SERPL-MCNC: 6.5 G/DL (ref 6.4–8.2)
RBC # BLD AUTO: 2.56 M/UL (ref 3.8–5.2)
RBC # BLD AUTO: 2.78 M/UL (ref 3.8–5.2)
RBC MORPH BLD: (no result)
RBC MORPH BLD: ABNORMAL
SERVICE CMNT-IMP: (no result)
SODIUM SERPL-SCNC: 132 MMOL/L (ref 136–145)
WBC # BLD AUTO: 11.4 K/UL (ref 3.6–11)
WBC # BLD AUTO: 13.3 K/UL (ref 3.6–11)

## (undated) DEVICE — CONNECTOR ELECSURG ARCONNECT AR PRB SGL USE DISP

## (undated) DEVICE — REM POLYHESIVE ADULT PATIENT RETURN ELECTRODE: Brand: VALLEYLAB

## (undated) DEVICE — TUBING HYDR IRR --

## (undated) DEVICE — APC PROBE 2200 C, SINGLE USE OD 2.3MM/6.9FR; L 2.2M/7.2FT: Brand: ERBE

## (undated) DEVICE — ELECTRODE PT RET AD L9FT HI MOIST COND ADH HYDRGEL CORDED